# Patient Record
Sex: FEMALE | Race: WHITE | Employment: OTHER | URBAN - METROPOLITAN AREA
[De-identification: names, ages, dates, MRNs, and addresses within clinical notes are randomized per-mention and may not be internally consistent; named-entity substitution may affect disease eponyms.]

---

## 2017-05-22 ENCOUNTER — OFFICE VISIT (OUTPATIENT)
Dept: OBGYN CLINIC | Age: 67
End: 2017-05-22
Payer: MEDICARE

## 2017-05-22 VITALS
SYSTOLIC BLOOD PRESSURE: 136 MMHG | DIASTOLIC BLOOD PRESSURE: 62 MMHG | WEIGHT: 236 LBS | HEIGHT: 62 IN | BODY MASS INDEX: 43.43 KG/M2

## 2017-05-22 DIAGNOSIS — Z87.410 HISTORY OF CERVICAL DYSPLASIA: ICD-10-CM

## 2017-05-22 DIAGNOSIS — B97.7 HIGH RISK HPV INFECTION: Primary | ICD-10-CM

## 2017-05-22 DIAGNOSIS — B97.7 HPV IN FEMALE: ICD-10-CM

## 2017-05-22 PROCEDURE — 3014F SCREEN MAMMO DOC REV: CPT | Performed by: OBSTETRICS & GYNECOLOGY

## 2017-05-22 PROCEDURE — 99203 OFFICE O/P NEW LOW 30 MIN: CPT | Performed by: OBSTETRICS & GYNECOLOGY

## 2017-05-22 PROCEDURE — 1036F TOBACCO NON-USER: CPT | Performed by: OBSTETRICS & GYNECOLOGY

## 2017-05-22 PROCEDURE — G8417 CALC BMI ABV UP PARAM F/U: HCPCS | Performed by: OBSTETRICS & GYNECOLOGY

## 2017-05-22 PROCEDURE — G8427 DOCREV CUR MEDS BY ELIG CLIN: HCPCS | Performed by: OBSTETRICS & GYNECOLOGY

## 2017-05-22 PROCEDURE — 1123F ACP DISCUSS/DSCN MKR DOCD: CPT | Performed by: OBSTETRICS & GYNECOLOGY

## 2017-05-22 PROCEDURE — G8400 PT W/DXA NO RESULTS DOC: HCPCS | Performed by: OBSTETRICS & GYNECOLOGY

## 2017-05-22 PROCEDURE — 3017F COLORECTAL CA SCREEN DOC REV: CPT | Performed by: OBSTETRICS & GYNECOLOGY

## 2017-05-22 PROCEDURE — 1090F PRES/ABSN URINE INCON ASSESS: CPT | Performed by: OBSTETRICS & GYNECOLOGY

## 2017-05-22 PROCEDURE — 4040F PNEUMOC VAC/ADMIN/RCVD: CPT | Performed by: OBSTETRICS & GYNECOLOGY

## 2017-05-22 RX ORDER — LANCETS
EACH MISCELLANEOUS
COMMUNITY
Start: 2017-04-04

## 2017-05-22 RX ORDER — HYDROCODONE BITARTRATE AND ACETAMINOPHEN 5; 325 MG/1; MG/1
TABLET ORAL
Refills: 0 | COMMUNITY
Start: 2017-05-11

## 2017-05-22 RX ORDER — BLOOD SUGAR DIAGNOSTIC
STRIP MISCELLANEOUS
COMMUNITY
Start: 2017-04-04

## 2017-05-22 RX ORDER — EPINEPHRINE 0.3 MG/.3ML
INJECTION INTRAMUSCULAR
COMMUNITY
Start: 2017-04-02

## 2017-06-15 ENCOUNTER — PROCEDURE VISIT (OUTPATIENT)
Dept: OBGYN CLINIC | Age: 67
End: 2017-06-15
Payer: MEDICARE

## 2017-06-15 ENCOUNTER — HOSPITAL ENCOUNTER (OUTPATIENT)
Age: 67
Setting detail: SPECIMEN
Discharge: HOME | End: 2017-06-15
Payer: MEDICARE

## 2017-06-15 VITALS
DIASTOLIC BLOOD PRESSURE: 68 MMHG | BODY MASS INDEX: 43.43 KG/M2 | SYSTOLIC BLOOD PRESSURE: 120 MMHG | WEIGHT: 236 LBS | HEIGHT: 62 IN

## 2017-06-15 DIAGNOSIS — Z87.410 HISTORY OF CERVICAL DYSPLASIA: ICD-10-CM

## 2017-06-15 DIAGNOSIS — R87.810 CERVICAL HIGH RISK HUMAN PAPILLOMAVIRUS (HPV) DNA TEST POSITIVE: Primary | ICD-10-CM

## 2017-06-15 DIAGNOSIS — B97.7 HPV IN FEMALE: ICD-10-CM

## 2017-06-15 PROCEDURE — 1036F TOBACCO NON-USER: CPT | Performed by: OBSTETRICS & GYNECOLOGY

## 2017-06-15 PROCEDURE — 57454 BX/CURETT OF CERVIX W/SCOPE: CPT | Performed by: OBSTETRICS & GYNECOLOGY

## 2017-06-15 RX ORDER — LORATADINE 10 MG/1
10 CAPSULE, LIQUID FILLED ORAL DAILY
Status: ON HOLD | COMMUNITY
End: 2018-09-21 | Stop reason: ALTCHOICE

## 2017-06-19 LAB — SURGICAL PATHOLOGY REPORT: NORMAL

## 2017-06-20 ENCOUNTER — TELEPHONE (OUTPATIENT)
Dept: OBGYN CLINIC | Age: 67
End: 2017-06-20

## 2018-03-22 ENCOUNTER — OFFICE VISIT (OUTPATIENT)
Dept: OBGYN CLINIC | Age: 68
End: 2018-03-22
Payer: MEDICARE

## 2018-03-22 ENCOUNTER — HOSPITAL ENCOUNTER (OUTPATIENT)
Age: 68
Setting detail: SPECIMEN
Discharge: HOME | End: 2018-03-22
Payer: MEDICARE

## 2018-03-22 VITALS
SYSTOLIC BLOOD PRESSURE: 118 MMHG | WEIGHT: 233 LBS | DIASTOLIC BLOOD PRESSURE: 60 MMHG | HEIGHT: 63 IN | BODY MASS INDEX: 41.29 KG/M2

## 2018-03-22 DIAGNOSIS — N89.8 VAGINAL ITCHING: Primary | ICD-10-CM

## 2018-03-22 DIAGNOSIS — N90.89 VULVAR LESION: ICD-10-CM

## 2018-03-22 LAB
DIRECT EXAM: NORMAL
Lab: NORMAL
SPECIMEN DESCRIPTION: NORMAL
STATUS: NORMAL

## 2018-03-22 PROCEDURE — 3017F COLORECTAL CA SCREEN DOC REV: CPT | Performed by: OBSTETRICS & GYNECOLOGY

## 2018-03-22 PROCEDURE — 4040F PNEUMOC VAC/ADMIN/RCVD: CPT | Performed by: OBSTETRICS & GYNECOLOGY

## 2018-03-22 PROCEDURE — G8400 PT W/DXA NO RESULTS DOC: HCPCS | Performed by: OBSTETRICS & GYNECOLOGY

## 2018-03-22 PROCEDURE — 1123F ACP DISCUSS/DSCN MKR DOCD: CPT | Performed by: OBSTETRICS & GYNECOLOGY

## 2018-03-22 PROCEDURE — G8427 DOCREV CUR MEDS BY ELIG CLIN: HCPCS | Performed by: OBSTETRICS & GYNECOLOGY

## 2018-03-22 PROCEDURE — 1036F TOBACCO NON-USER: CPT | Performed by: OBSTETRICS & GYNECOLOGY

## 2018-03-22 PROCEDURE — 1090F PRES/ABSN URINE INCON ASSESS: CPT | Performed by: OBSTETRICS & GYNECOLOGY

## 2018-03-22 PROCEDURE — G8417 CALC BMI ABV UP PARAM F/U: HCPCS | Performed by: OBSTETRICS & GYNECOLOGY

## 2018-03-22 PROCEDURE — 99213 OFFICE O/P EST LOW 20 MIN: CPT | Performed by: OBSTETRICS & GYNECOLOGY

## 2018-03-22 PROCEDURE — 3014F SCREEN MAMMO DOC REV: CPT | Performed by: OBSTETRICS & GYNECOLOGY

## 2018-03-22 PROCEDURE — G8484 FLU IMMUNIZE NO ADMIN: HCPCS | Performed by: OBSTETRICS & GYNECOLOGY

## 2018-03-22 RX ORDER — MELOXICAM 15 MG/1
TABLET ORAL
Refills: 0 | COMMUNITY
Start: 2018-01-24

## 2018-03-22 RX ORDER — FLUCONAZOLE 150 MG/1
150 TABLET ORAL ONCE
Qty: 1 TABLET | Refills: 0 | Status: SHIPPED | OUTPATIENT
Start: 2018-03-22 | End: 2018-03-22

## 2018-03-22 RX ORDER — CLOBETASOL PROPIONATE 0.5 MG/G
CREAM TOPICAL
Qty: 1 TUBE | Refills: 0 | Status: SHIPPED | OUTPATIENT
Start: 2018-03-22 | End: 2018-04-04 | Stop reason: SDUPTHER

## 2018-03-22 NOTE — PROGRESS NOTES
Homicidal thoughts,suicidal thoughts, or anxiety  Breast ROS: No prior breast abnormalities or lumps  Respiratory ROS: No SOB, Pneumoniae,Cough, or Pulmonary Embolism History  Cardiovascular ROS: No Chest Pain with Exertion, Palpitations, Syncope, Edema, Arrhythmia  Gastrointestinal ROS: No Indigestion, Heartburn, Nausea, vomiting, Diarrhea, Constipation,or Bowel Changes; No Bloody Stools or melena  Genito-Urinary ROS: No Dysuria, Hematuria or Nocturia. No Urinary Incontinence or Vaginal Discharge +vaginal itching  Musculoskeletal ROS: No Arthralgia, Arthritis,Gout,Osteoporosis or Rheumatism  Neurological ROS: No CVA, Migraines, Epilepsy, Seizure Hx, or Limb Weakness  Dermatological ROS: No Rash, Itching, Hives, Mole Changes or Cancer    Blood pressure 118/60, height 5' 2.5\" (1.588 m), weight 233 lb (105.7 kg), not currently breastfeeding. Abdomen: Soft non-tender; good bowel sounds. No guarding, rebound or rigidity. No CVA tenderness bilaterally. Extremities: No calf tenderness, DTR 2/4, and No edema bilaterally    Pelvic: Vulva has noticeable raised, white, hard round lesions around periurethral and superior bilateral labial area. Vagina appear normal without lesion or discharge, mild atrophy noted. Diagnostics:  No results found. Lab Results:  Results for orders placed or performed during the hospital encounter of 06/15/17   Surgical Pathology   Result Value Ref Range    Surgical Pathology Report       (NOTE)  [address], [facility] [address]. [place], [address]  [phone]  Fax: [phone]    [facility]    Patient Name: Denise Hope Med Rec: [id number]  Path Number: [id number]  Collected: 6/15/2017  Received: 6/16/2017  Reported: 6/19/2017 14:12    -- Diagnosis --    1. ENDOCERVICAL CURETTING:        SQUAMOUS MUCOSA, NEGATIVE FOR DYSPLASIA.     2.  CERVIX, BIOPSY AT 4:00: SQUAMOUS MUCOSA WITH MILD CHRONIC INFLAMMATION,      NEGATIVE FOR DYSPLASIA       ROBERTA Hunter  **Electronically Signed Out**         rdd/6/19/2017      Clinical Information  Pre-op Diagnosis:  CERVICAL HIGH RISK HPV DNA TEST POSITIVE; HISTORY  OF CERVICAL DYSPLASIA   Operative Findings:  ECC, 4 O'CLOCK    Source of Specimen  1: ECC  2: 4 O'CLOCK    Gross Description  1. \"ALEJANDRA TINTA, ECC\" Few pale tan tissue flecks that may not  survive processing. Entirely 1cs. 2. \"ALEJANDRA TINTA, 4 O'CL OCK\" 0.4 x 0.1 x 0.1 cm tan-white fragment. Entirely 1cs. po tm      Microscopic Description  1. Sample consists of scant fragments of squamous mucosa without  atypia. 2.  Biopsy samples squamous mucosa. This is mature with mild chronic  inflammation, predominantly a few lymphocytes in the subepithelium. There is no dysplasia. Assessment:  1. Vaginal itching  VAGINITIS DNA PROBE    Strep B Screen, Vaginal / Rectal    fluconazole (DIFLUCAN) 150 MG tablet    clobetasol (TEMOVATE) 0.05 % cream   2. Vulvar lesion  VAGINITIS DNA PROBE    Strep B Screen, Vaginal / Rectal    fluconazole (DIFLUCAN) 150 MG tablet    clobetasol (TEMOVATE) 0.05 % cream     Patient Active Problem List    Diagnosis Date Noted    HPV in female 05/22/2017 2017 Normal Pap Smear- + HPV Type 18      History of cervical dysplasia 05/22/2017     Cervical conization x2       PLAN:  Return in about 1 week (around 3/29/2018) for Vulvar biopsy. Diflucan and topical steroid cream  Vulvar biopsy  Repeat Annual every 1 year  Cervical Cytology Evaluation begins at 24years old. If Negative Cytology, Follow-up screening per current guidelines. Counseled on preventative health maintenance follow-up. Orders Placed This Encounter   Procedures    VAGINITIS DNA PROBE    Strep B Screen, Vaginal / Rectal       Patient was seen with total face to face time of 15 minutes.  More than 50% of this visit was counseling and education regarding The

## 2018-03-25 LAB
CULTURE: NORMAL
CULTURE: NORMAL
Lab: NORMAL
SPECIMEN DESCRIPTION: NORMAL
STATUS: NORMAL

## 2018-03-28 ENCOUNTER — PROCEDURE VISIT (OUTPATIENT)
Dept: OBGYN CLINIC | Age: 68
End: 2018-03-28
Payer: MEDICARE

## 2018-03-28 ENCOUNTER — HOSPITAL ENCOUNTER (OUTPATIENT)
Age: 68
Setting detail: SPECIMEN
Discharge: HOME | End: 2018-03-28
Payer: MEDICARE

## 2018-03-28 VITALS — DIASTOLIC BLOOD PRESSURE: 70 MMHG | SYSTOLIC BLOOD PRESSURE: 118 MMHG

## 2018-03-28 DIAGNOSIS — N89.8 VAGINAL ITCHING: Primary | ICD-10-CM

## 2018-03-28 DIAGNOSIS — N90.89 LABIAL IRRITATION: ICD-10-CM

## 2018-03-28 DIAGNOSIS — R23.4 SKIN TEXTURE CHANGES: ICD-10-CM

## 2018-03-28 PROCEDURE — 56605 BIOPSY OF VULVA/PERINEUM: CPT | Performed by: OBSTETRICS & GYNECOLOGY

## 2018-03-30 LAB — SURGICAL PATHOLOGY REPORT: NORMAL

## 2018-04-02 ENCOUNTER — TELEPHONE (OUTPATIENT)
Dept: OBGYN CLINIC | Age: 68
End: 2018-04-02

## 2018-04-04 ENCOUNTER — TELEPHONE (OUTPATIENT)
Dept: OBGYN CLINIC | Age: 68
End: 2018-04-04

## 2018-04-04 DIAGNOSIS — N90.89 VULVAR LESION: ICD-10-CM

## 2018-04-04 DIAGNOSIS — N89.8 VAGINAL ITCHING: ICD-10-CM

## 2018-04-04 RX ORDER — CLOBETASOL PROPIONATE 0.5 MG/G
CREAM TOPICAL
Qty: 1 TUBE | Refills: 1 | Status: SHIPPED | OUTPATIENT
Start: 2018-04-04 | End: 2018-07-26 | Stop reason: SDUPTHER

## 2018-05-29 DIAGNOSIS — N90.89 VULVAR LESION: ICD-10-CM

## 2018-05-29 DIAGNOSIS — N89.8 VAGINAL ITCHING: ICD-10-CM

## 2018-05-30 RX ORDER — CLOBETASOL PROPIONATE 0.5 MG/G
CREAM TOPICAL
Qty: 15 G | Refills: 0 | Status: SHIPPED | OUTPATIENT
Start: 2018-05-30 | End: 2018-07-02 | Stop reason: SDUPTHER

## 2018-07-02 DIAGNOSIS — N89.8 VAGINAL ITCHING: ICD-10-CM

## 2018-07-02 DIAGNOSIS — N90.89 VULVAR LESION: ICD-10-CM

## 2018-07-02 RX ORDER — CLOBETASOL PROPIONATE 0.5 MG/G
CREAM TOPICAL
Qty: 15 G | Refills: 0 | Status: SHIPPED | OUTPATIENT
Start: 2018-07-02 | End: 2018-07-26 | Stop reason: SDUPTHER

## 2018-07-26 ENCOUNTER — OFFICE VISIT (OUTPATIENT)
Dept: OBGYN CLINIC | Age: 68
End: 2018-07-26
Payer: MEDICARE

## 2018-07-26 ENCOUNTER — HOSPITAL ENCOUNTER (OUTPATIENT)
Age: 68
Setting detail: SPECIMEN
Discharge: HOME | End: 2018-07-26
Payer: MEDICARE

## 2018-07-26 VITALS
WEIGHT: 240.38 LBS | BODY MASS INDEX: 42.59 KG/M2 | HEIGHT: 63 IN | RESPIRATION RATE: 16 BRPM | SYSTOLIC BLOOD PRESSURE: 114 MMHG | DIASTOLIC BLOOD PRESSURE: 62 MMHG

## 2018-07-26 DIAGNOSIS — B97.7 HPV IN FEMALE: Primary | ICD-10-CM

## 2018-07-26 DIAGNOSIS — N90.4 LICHEN SCLEROSUS OF FEMALE GENITALIA: ICD-10-CM

## 2018-07-26 DIAGNOSIS — Z87.410 HISTORY OF CERVICAL DYSPLASIA: ICD-10-CM

## 2018-07-26 DIAGNOSIS — N89.8 VAGINAL ITCHING: ICD-10-CM

## 2018-07-26 DIAGNOSIS — N90.89 VULVAR LESION: ICD-10-CM

## 2018-07-26 PROCEDURE — 99213 OFFICE O/P EST LOW 20 MIN: CPT | Performed by: OBSTETRICS & GYNECOLOGY

## 2018-07-26 PROCEDURE — G8400 PT W/DXA NO RESULTS DOC: HCPCS | Performed by: OBSTETRICS & GYNECOLOGY

## 2018-07-26 PROCEDURE — 3017F COLORECTAL CA SCREEN DOC REV: CPT | Performed by: OBSTETRICS & GYNECOLOGY

## 2018-07-26 PROCEDURE — 4040F PNEUMOC VAC/ADMIN/RCVD: CPT | Performed by: OBSTETRICS & GYNECOLOGY

## 2018-07-26 PROCEDURE — 1123F ACP DISCUSS/DSCN MKR DOCD: CPT | Performed by: OBSTETRICS & GYNECOLOGY

## 2018-07-26 PROCEDURE — 1101F PT FALLS ASSESS-DOCD LE1/YR: CPT | Performed by: OBSTETRICS & GYNECOLOGY

## 2018-07-26 PROCEDURE — G8417 CALC BMI ABV UP PARAM F/U: HCPCS | Performed by: OBSTETRICS & GYNECOLOGY

## 2018-07-26 PROCEDURE — 1090F PRES/ABSN URINE INCON ASSESS: CPT | Performed by: OBSTETRICS & GYNECOLOGY

## 2018-07-26 PROCEDURE — 1036F TOBACCO NON-USER: CPT | Performed by: OBSTETRICS & GYNECOLOGY

## 2018-07-26 PROCEDURE — G8427 DOCREV CUR MEDS BY ELIG CLIN: HCPCS | Performed by: OBSTETRICS & GYNECOLOGY

## 2018-07-26 RX ORDER — SIMVASTATIN 40 MG
20 TABLET ORAL
COMMUNITY
Start: 2018-07-16

## 2018-07-26 RX ORDER — CLOBETASOL PROPIONATE 0.5 MG/G
CREAM TOPICAL 2 TIMES DAILY
Qty: 15 G | Refills: 0 | Status: SHIPPED | OUTPATIENT
Start: 2018-07-26 | End: 2018-10-11 | Stop reason: SDUPTHER

## 2018-07-26 ASSESSMENT — PATIENT HEALTH QUESTIONNAIRE - PHQ9
2. FEELING DOWN, DEPRESSED OR HOPELESS: 0
1. LITTLE INTEREST OR PLEASURE IN DOING THINGS: 0
SUM OF ALL RESPONSES TO PHQ9 QUESTIONS 1 & 2: 0
SUM OF ALL RESPONSES TO PHQ QUESTIONS 1-9: 0

## 2018-07-26 NOTE — PROGRESS NOTES
CHANGES; LABIAL  IRRITATION   Operative Findings:  R LABIA BX    Source of Specimen  1: R LABIA BX    Gross Description  \"ALEJANDRA BALTAZAR, R LABIA BX\" 0.3 cm long x 0.2 cm in diameter tan punch. Inked intact 1cs. tm      Microscopic Description  Hyperkeratosis is present on the surface of the squamous epithelium. T he granular cell layer is mildly prominent, and there are a few,  scattered intraepithelial dyskeratotic cells. The subepithelial  tissue appears eosinophilic/hyalinized, with patchy chronic  inflammation. PAS stain (control appropriate) is negative for  infectious fungal elements. There is no evidence of dysplasia or  malignancy. Assessment:   Diagnosis Orders   1. HPV in female  PAP SMEAR   2. History of cervical dysplasia  PAP SMEAR   3. Vaginal itching  clobetasol (TEMOVATE) 0.05 % cream   4. Vulvar lesion  clobetasol (TEMOVATE) 0.05 % cream   5. Lichen sclerosus of female genitalia  clobetasol (TEMOVATE) 0.05 % cream     Patient Active Problem List    Diagnosis Date Noted    Lichen sclerosus of female genitalia 07/26/2018    HPV in female 05/22/2017     2017 Normal Pap Smear- + HPV Type 18      History of cervical dysplasia 05/22/2017     Cervical conization x2           PLAN:  Return if symptoms worsen or fail to improve, for annual.  Follow pap and HR HPV  Repeat Annual every 1 year  Cervical Cytology Evaluation begins at 24years old. If Negative Cytology, Follow-up screening per current guidelines. Counseled on preventative health maintenance follow-up. Orders Placed This Encounter   Procedures    PAP SMEAR     Patient History:    No LMP recorded.  Patient is postmenopausal.  OBGYN Status: Postmenopausal  Past Surgical History:  No date: APPENDECTOMY  No date: CHOLECYSTECTOMY  06/15/2017: COLPOSCOPY      Comment: dr bella- has had previous colp before  No date: ENDOSCOPY, COLON, DIAGNOSTIC      Comment: EGD  09-05-14: ENDOSCOPY, COLON, DIAGNOSTIC      Comment: yaya esoph, milliary lesions duodenal                bulb, hiatal hernia, healed antral ulcer. No date: FOOT SURGERY Right      Comment: foreign body removed  No date: JOINT REPLACEMENT Bilateral      Comment: knees  No date: TUBAL LIGATION  9/11/15: UPPER GASTROINTESTINAL ENDOSCOPY      Comment: BARRETTS    Problem List       Edg Problems Affecting Cytology    HPV in female    Overview Signed 6/15/2017  9:29 AM by Nayely Michelle Normal Pap Smear- + HPV Type 18       History of cervical dysplasia    Overview Signed 5/22/2017  8:41 AM by Queta Anderson,      Cervical conization x2        Smoking status: Former Smoker                                                              Packs/day: 0.00      Years: 0.00         Quit date: 1/4/2011     Smokeless tobacco: Not on file                          Standing Status:   Future     Standing Expiration Date:   7/27/2019     Order Specific Question:   Collection Type     Answer: Thin Prep     Order Specific Question:   Prior Abnormal Pap Test     Answer:   No     Order Specific Question:   Screening or Diagnostic     Answer:   Diagnostic     Order Specific Question:   HPV Requested? Answer:   Yes     Order Specific Question:   High Risk Patient     Answer:   N/A       Patient was seen with total face to face time of 15 minutes. More than 50% of this visit was counseling and education regarding The primary encounter diagnosis was HPV in female. Diagnoses of History of cervical dysplasia, Vaginal itching, Vulvar lesion, and Lichen sclerosus of female genitalia were also pertinent to this visit. and Abnormal Pap Smear   as well as  counseling on preventative health maintenance follow-up.

## 2018-07-27 LAB
HPV SAMPLE: ABNORMAL
HPV SOURCE: ABNORMAL
HPV, GENOTYPE 16: NOT DETECTED
HPV, GENOTYPE 18: DETECTED
HPV, HIGH RISK OTHER: NOT DETECTED
HPV, INTERPRETATION: ABNORMAL

## 2018-08-11 LAB — CYTOLOGY REPORT: NORMAL

## 2018-08-14 ENCOUNTER — TELEPHONE (OUTPATIENT)
Dept: OBGYN CLINIC | Age: 68
End: 2018-08-14

## 2018-08-14 NOTE — TELEPHONE ENCOUNTER
----- Message from Ana Danielle DO sent at 8/14/2018  8:09 AM EDT -----  Pap smear is negative, but with absent transformation zone and +HR HPV type 18. Per ASCCP guidelines I recommend colposcopy when patient is able. Thank you.

## 2018-09-21 ENCOUNTER — ANESTHESIA EVENT (OUTPATIENT)
Dept: OPERATING ROOM | Age: 68
End: 2018-09-21
Payer: MEDICARE

## 2018-09-21 ENCOUNTER — ANESTHESIA (OUTPATIENT)
Dept: OPERATING ROOM | Age: 68
End: 2018-09-21
Payer: MEDICARE

## 2018-09-21 ENCOUNTER — HOSPITAL ENCOUNTER (OUTPATIENT)
Age: 68
Setting detail: OUTPATIENT SURGERY
Discharge: HOME | End: 2018-09-21
Attending: SURGERY | Admitting: SURGERY
Payer: MEDICARE

## 2018-09-21 VITALS
HEART RATE: 87 BPM | BODY MASS INDEX: 44.16 KG/M2 | OXYGEN SATURATION: 94 % | DIASTOLIC BLOOD PRESSURE: 82 MMHG | SYSTOLIC BLOOD PRESSURE: 122 MMHG | WEIGHT: 240 LBS | HEIGHT: 62 IN | TEMPERATURE: 97.4 F | RESPIRATION RATE: 16 BRPM

## 2018-09-21 VITALS — SYSTOLIC BLOOD PRESSURE: 85 MMHG | OXYGEN SATURATION: 96 % | DIASTOLIC BLOOD PRESSURE: 50 MMHG

## 2018-09-21 LAB — GLUCOSE BLD-MCNC: 119 MG/DL (ref 65–105)

## 2018-09-21 PROCEDURE — 3700000001 HC ADD 15 MINUTES (ANESTHESIA): Performed by: SURGERY

## 2018-09-21 PROCEDURE — 3700000000 HC ANESTHESIA ATTENDED CARE: Performed by: SURGERY

## 2018-09-21 PROCEDURE — 7100000031 HC ASPR PHASE II RECOVERY - ADDTL 15 MIN: Performed by: SURGERY

## 2018-09-21 PROCEDURE — 82947 ASSAY GLUCOSE BLOOD QUANT: CPT

## 2018-09-21 PROCEDURE — 6360000002 HC RX W HCPCS

## 2018-09-21 PROCEDURE — 2500000003 HC RX 250 WO HCPCS

## 2018-09-21 PROCEDURE — 3609027000 HC COLONOSCOPY: Performed by: SURGERY

## 2018-09-21 PROCEDURE — 2580000003 HC RX 258: Performed by: SURGERY

## 2018-09-21 PROCEDURE — 7100000030 HC ASPR PHASE II RECOVERY - FIRST 15 MIN: Performed by: SURGERY

## 2018-09-21 PROCEDURE — 7100000000 HC PACU RECOVERY - FIRST 15 MIN: Performed by: SURGERY

## 2018-09-21 PROCEDURE — 2709999900 HC NON-CHARGEABLE SUPPLY: Performed by: SURGERY

## 2018-09-21 PROCEDURE — 7100000001 HC PACU RECOVERY - ADDTL 15 MIN: Performed by: SURGERY

## 2018-09-21 RX ORDER — DEXAMETHASONE SODIUM PHOSPHATE 4 MG/ML
INJECTION, SOLUTION INTRA-ARTICULAR; INTRALESIONAL; INTRAMUSCULAR; INTRAVENOUS; SOFT TISSUE PRN
Status: DISCONTINUED | OUTPATIENT
Start: 2018-09-21 | End: 2018-09-21 | Stop reason: SDUPTHER

## 2018-09-21 RX ORDER — SODIUM CHLORIDE, SODIUM LACTATE, POTASSIUM CHLORIDE, CALCIUM CHLORIDE 600; 310; 30; 20 MG/100ML; MG/100ML; MG/100ML; MG/100ML
INJECTION, SOLUTION INTRAVENOUS CONTINUOUS
Status: DISCONTINUED | OUTPATIENT
Start: 2018-09-21 | End: 2018-09-21 | Stop reason: HOSPADM

## 2018-09-21 RX ORDER — DIPHENHYDRAMINE HYDROCHLORIDE 50 MG/ML
12.5 INJECTION INTRAMUSCULAR; INTRAVENOUS
Status: DISCONTINUED | OUTPATIENT
Start: 2018-09-21 | End: 2018-09-21 | Stop reason: HOSPADM

## 2018-09-21 RX ORDER — MEPERIDINE HYDROCHLORIDE 50 MG/ML
12.5 INJECTION INTRAMUSCULAR; INTRAVENOUS; SUBCUTANEOUS EVERY 5 MIN PRN
Status: DISCONTINUED | OUTPATIENT
Start: 2018-09-21 | End: 2018-09-21 | Stop reason: HOSPADM

## 2018-09-21 RX ORDER — ONDANSETRON 2 MG/ML
4 INJECTION INTRAMUSCULAR; INTRAVENOUS
Status: DISCONTINUED | OUTPATIENT
Start: 2018-09-21 | End: 2018-09-21 | Stop reason: HOSPADM

## 2018-09-21 RX ORDER — PROPOFOL 10 MG/ML
INJECTION, EMULSION INTRAVENOUS PRN
Status: DISCONTINUED | OUTPATIENT
Start: 2018-09-21 | End: 2018-09-21 | Stop reason: SDUPTHER

## 2018-09-21 RX ORDER — MORPHINE SULFATE 2 MG/ML
2 INJECTION, SOLUTION INTRAMUSCULAR; INTRAVENOUS EVERY 5 MIN PRN
Status: DISCONTINUED | OUTPATIENT
Start: 2018-09-21 | End: 2018-09-21 | Stop reason: HOSPADM

## 2018-09-21 RX ORDER — LABETALOL HYDROCHLORIDE 5 MG/ML
5 INJECTION, SOLUTION INTRAVENOUS EVERY 10 MIN PRN
Status: DISCONTINUED | OUTPATIENT
Start: 2018-09-21 | End: 2018-09-21 | Stop reason: HOSPADM

## 2018-09-21 RX ORDER — ONDANSETRON 2 MG/ML
INJECTION INTRAMUSCULAR; INTRAVENOUS PRN
Status: DISCONTINUED | OUTPATIENT
Start: 2018-09-21 | End: 2018-09-21 | Stop reason: SDUPTHER

## 2018-09-21 RX ORDER — FENTANYL CITRATE 50 UG/ML
INJECTION, SOLUTION INTRAMUSCULAR; INTRAVENOUS PRN
Status: DISCONTINUED | OUTPATIENT
Start: 2018-09-21 | End: 2018-09-21 | Stop reason: SDUPTHER

## 2018-09-21 RX ORDER — LIDOCAINE HYDROCHLORIDE 10 MG/ML
INJECTION, SOLUTION EPIDURAL; INFILTRATION; INTRACAUDAL; PERINEURAL PRN
Status: DISCONTINUED | OUTPATIENT
Start: 2018-09-21 | End: 2018-09-21 | Stop reason: SDUPTHER

## 2018-09-21 RX ORDER — EPHEDRINE SULFATE 50 MG/ML
INJECTION, SOLUTION INTRAVENOUS PRN
Status: DISCONTINUED | OUTPATIENT
Start: 2018-09-21 | End: 2018-09-21 | Stop reason: SDUPTHER

## 2018-09-21 RX ADMIN — EPHEDRINE SULFATE 10 MG: 50 INJECTION INTRAMUSCULAR; INTRAVENOUS; SUBCUTANEOUS at 08:05

## 2018-09-21 RX ADMIN — SODIUM CHLORIDE, POTASSIUM CHLORIDE, SODIUM LACTATE AND CALCIUM CHLORIDE: 600; 310; 30; 20 INJECTION, SOLUTION INTRAVENOUS at 06:54

## 2018-09-21 RX ADMIN — EPHEDRINE SULFATE 10 MG: 50 INJECTION INTRAMUSCULAR; INTRAVENOUS; SUBCUTANEOUS at 07:53

## 2018-09-21 RX ADMIN — FENTANYL CITRATE 50 MCG: 50 INJECTION, SOLUTION INTRAMUSCULAR; INTRAVENOUS at 07:50

## 2018-09-21 RX ADMIN — LIDOCAINE HYDROCHLORIDE 50 MG: 10 INJECTION, SOLUTION EPIDURAL; INFILTRATION; INTRACAUDAL; PERINEURAL at 07:33

## 2018-09-21 RX ADMIN — ONDANSETRON 4 MG: 2 INJECTION INTRAMUSCULAR; INTRAVENOUS at 07:38

## 2018-09-21 RX ADMIN — EPHEDRINE SULFATE 10 MG: 50 INJECTION INTRAMUSCULAR; INTRAVENOUS; SUBCUTANEOUS at 07:55

## 2018-09-21 RX ADMIN — PROPOFOL 200 MG: 10 INJECTION, EMULSION INTRAVENOUS at 07:33

## 2018-09-21 RX ADMIN — DEXAMETHASONE SODIUM PHOSPHATE 4 MG: 4 INJECTION, SOLUTION INTRAMUSCULAR; INTRAVENOUS at 07:38

## 2018-09-21 ASSESSMENT — PULMONARY FUNCTION TESTS
PIF_VALUE: 11
PIF_VALUE: 12
PIF_VALUE: 14
PIF_VALUE: 14
PIF_VALUE: 13
PIF_VALUE: 1
PIF_VALUE: 12
PIF_VALUE: 13
PIF_VALUE: 15
PIF_VALUE: 12
PIF_VALUE: 11
PIF_VALUE: 1
PIF_VALUE: 14
PIF_VALUE: 14
PIF_VALUE: 1
PIF_VALUE: 3
PIF_VALUE: 14
PIF_VALUE: 13
PIF_VALUE: 12
PIF_VALUE: 27
PIF_VALUE: 1
PIF_VALUE: 13
PIF_VALUE: 14
PIF_VALUE: 13
PIF_VALUE: 13
PIF_VALUE: 14
PIF_VALUE: 14
PIF_VALUE: 11
PIF_VALUE: 17
PIF_VALUE: 11
PIF_VALUE: 12
PIF_VALUE: 11
PIF_VALUE: 14
PIF_VALUE: 12
PIF_VALUE: 11
PIF_VALUE: 12
PIF_VALUE: 1
PIF_VALUE: 14
PIF_VALUE: 14
PIF_VALUE: 13
PIF_VALUE: 12
PIF_VALUE: 13
PIF_VALUE: 14
PIF_VALUE: 12
PIF_VALUE: 11

## 2018-09-21 ASSESSMENT — PAIN SCALES - GENERAL
PAINLEVEL_OUTOF10: 0

## 2018-09-21 ASSESSMENT — ENCOUNTER SYMPTOMS
STRIDOR: 0
SHORTNESS OF BREATH: 0

## 2018-09-21 ASSESSMENT — LIFESTYLE VARIABLES: SMOKING_STATUS: 0

## 2018-09-21 ASSESSMENT — PAIN - FUNCTIONAL ASSESSMENT: PAIN_FUNCTIONAL_ASSESSMENT: 0-10

## 2018-09-21 NOTE — ANESTHESIA POSTPROCEDURE EVALUATION
POST- ANESTHESIA EVALUATION       Pt Name: Gieslle Sam  MRN: [id number]  [place]: 1950  Date of evaluation: 9/21/2018  Time:  1:45 PM      /82   Pulse 87   Temp 97.4 °F (36.3 °C) (Temporal)   Resp 16   Ht 5' 2\" (1.575 m)   Wt 240 lb (108.9 kg)   SpO2 94%   BMI 43.90 kg/m²      Consciousness Level  Awake  Cardiopulmonary Status  Stable  Pain Adequately Treated YES  Nausea / Vomiting  NO  Adequate Hydration  YES  Anesthesia Related Complications NONE      Electronically signed by Colonel Riedel, MD on 9/21/2018 at 1:45 PM       Department of Anesthesiology  Postprocedure Note    Patient: Giselle Sam  MRN: [id number]  YOB: 1950  Date of evaluation: 9/21/2018  Time:  1:45 PM     Procedure Summary     Date:  09/21/18 Room / Location:  [address] [place] [address] Denise Gauthier 08 / [address]    Anesthesia Start:  0247 Anesthesia Stop:  0820    Procedure:  COLONOSCOPY (N/A ) Diagnosis:  (DIVERTICULITIS (PAT ON ADMIT OFFICE TO Luis Tavares))    Surgeon:  Ary Badillo MD Responsible Provider:  Colonel Riedel, MD    Anesthesia Type:  MAC, general ASA Status:  3          Anesthesia Type: MAC, general    Miguel Ángel Phase I: Miguel Ángel Score: 10    Miguel Ángel Phase II: Miguel Ángel Score: 10    Last vitals: Reviewed and per EMR flowsheets.        Anesthesia Post Evaluation

## 2018-09-21 NOTE — H&P
No current facility-administered medications on file prior to encounter. Current Outpatient Prescriptions on File Prior to Encounter   Medication Sig Dispense Refill    simvastatin (ZOCOR) 40 MG tablet simvastatin 40 mg tablet   Take 0.5 tablets every day by oral route.  clobetasol (TEMOVATE) 0.05 % cream Apply topically 2 times daily Apply topically 2 times daily. 15 g 0    meloxicam (MOBIC) 15 MG tablet TK 1 T PO QD  0    PROAIR  (90 BASE) MCG/ACT inhaler       NEXIUM 40 MG delayed release capsule       HYDROcodone-acetaminophen (NORCO) 5-325 MG per tablet TK 1 T PO TID PRN MUST LAST 30 DAYS  0    metFORMIN (GLUCOPHAGE) 1000 MG tablet       CALCIUM-VITAMIN D PO Take 1 tablet by mouth daily 1200mg/1000mg      aspirin 81 MG tablet Take 81 mg by mouth daily.  lisinopril-hydrochlorothiazide (PRINZIDE;ZESTORETIC) 20-12.5 MG per tablet Take 1 tablet by mouth daily.  Multiple Vitamins-Minerals (THERAPEUTIC MULTIVITAMIN-MINERALS) tablet Take 1 tablet by mouth daily.  EPIPEN 2-ELISEO 0.3 MG/0.3ML SOAJ injection       ACCU-CHEK ANNABEL PLUS strip       Accu-Chek Multiclix Lancets MISC       alendronate (FOSAMAX) 70 MG tablet Take 70 mg by mouth every 7 days Wed. Negative except for what is mentioned in the HPI. GENERAL PHYSICAL EXAM     Vitals: /69   Pulse 80   Temp 96.8 °F (36 °C)   Resp 16   Ht 5' 2\" (1.575 m)   Wt 240 lb (108.9 kg)   SpO2 97%   BMI 43.90 kg/m²  Body mass index is 43.9 kg/m². GENERAL APPEARANCE:   Amado Loaiza is 70 y.o.,  female, not obese, nourished, Denies any pain today. SKIN:  Warm, dry,               HEAD:  Normocephalic, Face symmetrical.                  EYES:  SAVANAH EOMI and gaze conjugate, Glasses            EARS:  No marked hearing loss. NOSE:  Nares patent .                   THROAT:  No erythema of pharynx and uvula is midline  Dentition is not loose NECK:    Has no carotid bruit,   Neck is supple,  Has no adenopathy. CHEST:  Good chest excursion                HEART:  HT regular, No murmer                 LUNGS:  Has no wheezes           ABDOMEN:   Abdomen is soft on palpation. No localized tenderness. No guarding or rigidity. No palpable organomegaly. LYMPHATICS:  No palpable cervical lymphadenopathy. LOCOMOTOR, BACK AND SPINE:  Has no back pain,                  EXTREMITIES: Good range of motion of upper and lower extremities, No pedal edema.   Skin is clear, Grasp strength is 5/5     NEUROLOGIC:  Alert and clear speech, No weakness                       PROVISIONAL DIAGNOSES / SURGERY:      Colonoscopy  Patient Active Problem List    Diagnosis Date Noted    Lichen sclerosus of female genitalia 07/26/2018    HPV in female 05/22/2017    History of cervical dysplasia 05/22/2017           BEATRIZ Brand CNP on 9/21/2018 at 6:48 AM

## 2018-09-21 NOTE — OP NOTE
PROCEDURE NOTE    DATE OF PROCEDURE: 9/21/2018    SURGEON: Nguyen James MD    ASSISTANT: None    PREOPERATIVE DIAGNOSIS: Abdominal pain    POSTOPERATIVE DIAGNOSIS: Sigmoid and scattered diverticulosis/significantly prominent external hemorrhoids    OPERATION: Total colonoscopy to cecum    ANESTHESIA: General    ESTIMATED BLOOD LOSS: None    COMPLICATIONS: None     SPECIMENS:  Was Not Obtained    HISTORY: The patient is a 70y.o. year old female with history of above preop diagnosis. I recommended colonoscopy with possible biopsy or polypectomy and I explained the risk, benefits, expected outcome, and alternatives to the procedure. Risks included but are not limited to bleeding, infection, respiratory distress, hypotension, and perforation of the colon and possibility of missing a lesion. The patient understands and is in agreement. PROCEDURE: The patient was given IV conscious sedation. The patient's SPO2 remained above 90% throughout the procedure. Digital rectal exam was normal.  The colonoscope was inserted through the anus into the rectum and advanced under direct vision to the cecum without difficulty. Terminal ileum was examined for approximately 2 inches. The prep was fair. Findings:    Cecum/Ascending colon: normal    Transverse colon: abnormal: Scattered diverticulosis    Descending/Sigmoid colon: abnormal: Scattered diverticulosis    Rectum/Anus: examined in normal and retroflexed positions and was abnormal: Significantly prominent external hemorrhoids    Withdrawal Time was (minutes): 15      Next screening colonoscopy: 5 years. If screening is less than 10 years the recommended reason is due:Fair preparation    The colon was decompressed. While withdrawing the scope the above findings were verified and the scope was removed. The patient tolerated the procedure and conscious sedation without unusual events.     In the recovery room patient was examined and remains hemodynamically stable. Discharge home when criteria met. Recommendations/Plan:     1. Discussed with the family  2. High fiber diet   3.  Precautions to avoid constipation    Electronically signed by Nguyen James MD  on 9/21/2018 at 8:11 AM

## 2018-09-21 NOTE — ANESTHESIA PRE PROCEDURE
liquid consumption: 09/20/18                        Date of last solid food consumption: 09/19/18    BMI:   Wt Readings from Last 3 Encounters:   09/21/18 240 lb (108.9 kg)   07/26/18 240 lb 6 oz (109 kg)   03/22/18 233 lb (105.7 kg)     Body mass index is 43.9 kg/m². CBC:   Lab Results   Component Value Date    WBC 7.0 08/22/2014    RBC 3.53 08/22/2014    HGB 10.6 08/22/2014    HCT 31.6 08/22/2014    MCV 89.5 08/22/2014    RDW 13.7 08/22/2014     08/22/2014     HB 10.6    CMP:   Lab Results   Component Value Date     10/07/2016    K 4.9 10/07/2016     10/07/2016    CO2 23 10/07/2016    BUN 21 10/07/2016    CREATININE 1.0 10/07/2016    GFRAA >60 08/22/2014    LABGLOM >60 08/22/2014    GLUCOSE 111 10/07/2016    CALCIUM 8.9 10/07/2016    BILITOT 0.40 02/26/2013    ALKPHOS 68 02/26/2013    AST 27 02/26/2013    ALT 18 02/26/2013       POC Tests: No results for input(s): POCGLU, POCNA, POCK, POCCL, POCBUN, POCHEMO, POCHCT in the last 72 hours. Coags:   Lab Results   Component Value Date    PROTIME 10.7 02/26/2013    INR 1.0 02/26/2013    APTT 26.8 02/26/2013       HCG (If Applicable): No results found for: PREGTESTUR, PREGSERUM, HCG, HCGQUANT     ABGs: No results found for: PHART, PO2ART, KQI0YBM, PBL6OZI, BEART, H8SWSZNU     Type & Screen (If Applicable):  No results found for: LABABO, [address]    Anesthesia Evaluation  Patient summary reviewed no history of anesthetic complications:   Airway: Mallampati: II  TM distance: >3 FB   Neck ROM: full  Mouth opening: > = 3 FB Dental: normal exam         Pulmonary:Negative Pulmonary ROS and normal exam  breath sounds clear to auscultation      (-) pneumonia, COPD, asthma, shortness of breath, recent URI, sleep apnea, rhonchi, wheezes, rales, stridor and not a current smoker          Patient did not smoke on day of surgery.                  Cardiovascular:  Exercise tolerance: good (>4 METS),   (+) hypertension: no interval change,     (-) pacemaker,

## 2018-10-02 ENCOUNTER — PROCEDURE VISIT (OUTPATIENT)
Dept: OBGYN CLINIC | Age: 68
End: 2018-10-02
Payer: MEDICARE

## 2018-10-02 ENCOUNTER — HOSPITAL ENCOUNTER (OUTPATIENT)
Age: 68
Setting detail: SPECIMEN
Discharge: HOME | End: 2018-10-02
Payer: MEDICARE

## 2018-10-02 VITALS
BODY MASS INDEX: 42.69 KG/M2 | DIASTOLIC BLOOD PRESSURE: 78 MMHG | HEIGHT: 62 IN | SYSTOLIC BLOOD PRESSURE: 118 MMHG | WEIGHT: 232 LBS

## 2018-10-02 DIAGNOSIS — B97.7 HPV IN FEMALE: Primary | ICD-10-CM

## 2018-10-02 PROCEDURE — 57454 BX/CURETT OF CERVIX W/SCOPE: CPT | Performed by: OBSTETRICS & GYNECOLOGY

## 2018-10-04 LAB — SURGICAL PATHOLOGY REPORT: NORMAL

## 2018-10-11 DIAGNOSIS — N90.89 VULVAR LESION: ICD-10-CM

## 2018-10-11 DIAGNOSIS — N89.8 VAGINAL ITCHING: ICD-10-CM

## 2018-10-11 DIAGNOSIS — N90.4 LICHEN SCLEROSUS OF FEMALE GENITALIA: ICD-10-CM

## 2018-10-12 RX ORDER — CLOBETASOL PROPIONATE 0.5 MG/G
CREAM TOPICAL
Qty: 15 G | Refills: 0 | Status: SHIPPED | OUTPATIENT
Start: 2018-10-12 | End: 2018-12-19 | Stop reason: SDUPTHER

## 2018-12-04 ENCOUNTER — HOSPITAL ENCOUNTER (OUTPATIENT)
Dept: PREADMISSION TESTING | Age: 68
Discharge: HOME | End: 2018-12-08
Payer: MEDICARE

## 2018-12-04 ENCOUNTER — HOSPITAL ENCOUNTER (OUTPATIENT)
Dept: GENERAL RADIOLOGY | Age: 68
Discharge: HOME | End: 2018-12-06
Payer: MEDICARE

## 2018-12-04 VITALS
OXYGEN SATURATION: 100 % | RESPIRATION RATE: 12 BRPM | HEART RATE: 75 BPM | HEIGHT: 62 IN | WEIGHT: 239 LBS | BODY MASS INDEX: 43.98 KG/M2 | TEMPERATURE: 97.7 F | DIASTOLIC BLOOD PRESSURE: 68 MMHG | SYSTOLIC BLOOD PRESSURE: 132 MMHG

## 2018-12-04 DIAGNOSIS — Z87.410 HISTORY OF CERVICAL DYSPLASIA: Primary | ICD-10-CM

## 2018-12-04 DIAGNOSIS — Z87.410 HISTORY OF CERVICAL DYSPLASIA: ICD-10-CM

## 2018-12-04 LAB
ABSOLUTE EOS #: 0.3 K/UL (ref 0–0.4)
ABSOLUTE IMMATURE GRANULOCYTE: ABNORMAL K/UL (ref 0–0.3)
ABSOLUTE LYMPH #: 1.4 K/UL (ref 1–4.8)
ABSOLUTE MONO #: 0.6 K/UL (ref 0.1–1.3)
ANION GAP SERPL CALCULATED.3IONS-SCNC: 13 MMOL/L (ref 9–17)
BASOPHILS # BLD: 0 % (ref 0–2)
BASOPHILS ABSOLUTE: 0 K/UL (ref 0–0.2)
BILIRUBIN URINE: NEGATIVE
BUN BLDV-MCNC: 21 MG/DL (ref 8–23)
BUN/CREAT BLD: ABNORMAL (ref 9–20)
CALCIUM SERPL-MCNC: 9.1 MG/DL (ref 8.6–10.4)
CHLORIDE BLD-SCNC: 96 MMOL/L (ref 98–107)
CO2: 25 MMOL/L (ref 20–31)
COLOR: YELLOW
COMMENT UA: NORMAL
CREAT SERPL-MCNC: 0.73 MG/DL (ref 0.5–0.9)
DIFFERENTIAL TYPE: ABNORMAL
EKG ATRIAL RATE: 66 BPM
EKG P AXIS: 47 DEGREES
EKG P-R INTERVAL: 166 MS
EKG Q-T INTERVAL: 408 MS
EKG QRS DURATION: 82 MS
EKG QTC CALCULATION (BAZETT): 427 MS
EKG R AXIS: 27 DEGREES
EKG T AXIS: 41 DEGREES
EKG VENTRICULAR RATE: 66 BPM
EOSINOPHILS RELATIVE PERCENT: 4 % (ref 0–4)
GFR AFRICAN AMERICAN: >60 ML/MIN
GFR NON-AFRICAN AMERICAN: >60 ML/MIN
GFR SERPL CREATININE-BSD FRML MDRD: ABNORMAL ML/MIN/{1.73_M2}
GFR SERPL CREATININE-BSD FRML MDRD: ABNORMAL ML/MIN/{1.73_M2}
GLUCOSE BLD-MCNC: 115 MG/DL (ref 70–99)
GLUCOSE URINE: NEGATIVE
HCT VFR BLD CALC: 29.6 % (ref 36–46)
HEMOGLOBIN: 10 G/DL (ref 12–16)
IMMATURE GRANULOCYTES: ABNORMAL %
KETONES, URINE: NEGATIVE
LEUKOCYTE ESTERASE, URINE: NEGATIVE
LYMPHOCYTES # BLD: 20 % (ref 24–44)
MCH RBC QN AUTO: 30 PG (ref 26–34)
MCHC RBC AUTO-ENTMCNC: 33.7 G/DL (ref 31–37)
MCV RBC AUTO: 88.9 FL (ref 80–100)
MONOCYTES # BLD: 9 % (ref 1–7)
NITRITE, URINE: NEGATIVE
NRBC AUTOMATED: ABNORMAL PER 100 WBC
PDW BLD-RTO: 14.2 % (ref 11.5–14.9)
PH UA: 7 (ref 5–8)
PLATELET # BLD: 375 K/UL (ref 150–450)
PLATELET ESTIMATE: ABNORMAL
PMV BLD AUTO: 7 FL (ref 6–12)
POTASSIUM SERPL-SCNC: 4.9 MMOL/L (ref 3.7–5.3)
PROTEIN UA: NEGATIVE
RBC # BLD: 3.33 M/UL (ref 4–5.2)
RBC # BLD: ABNORMAL 10*6/UL
SEG NEUTROPHILS: 67 % (ref 36–66)
SEGMENTED NEUTROPHILS ABSOLUTE COUNT: 4.5 K/UL (ref 1.3–9.1)
SODIUM BLD-SCNC: 134 MMOL/L (ref 135–144)
SPECIFIC GRAVITY UA: 1.01 (ref 1–1.03)
TURBIDITY: CLEAR
URINE HGB: NEGATIVE
UROBILINOGEN, URINE: NORMAL
WBC # BLD: 6.8 K/UL (ref 3.5–11)
WBC # BLD: ABNORMAL 10*3/UL

## 2018-12-04 PROCEDURE — 71046 X-RAY EXAM CHEST 2 VIEWS: CPT

## 2018-12-04 PROCEDURE — 80048 BASIC METABOLIC PNL TOTAL CA: CPT

## 2018-12-04 PROCEDURE — 36415 COLL VENOUS BLD VENIPUNCTURE: CPT

## 2018-12-04 PROCEDURE — 85025 COMPLETE CBC W/AUTO DIFF WBC: CPT

## 2018-12-04 PROCEDURE — 81003 URINALYSIS AUTO W/O SCOPE: CPT

## 2018-12-04 PROCEDURE — 93005 ELECTROCARDIOGRAM TRACING: CPT

## 2018-12-05 ENCOUNTER — ANESTHESIA EVENT (OUTPATIENT)
Dept: OPERATING ROOM | Age: 68
End: 2018-12-05
Payer: MEDICARE

## 2018-12-05 RX ORDER — SODIUM CHLORIDE 0.9 % (FLUSH) 0.9 %
10 SYRINGE (ML) INJECTION PRN
Status: CANCELLED | OUTPATIENT
Start: 2018-12-05

## 2018-12-05 RX ORDER — SODIUM CHLORIDE, SODIUM LACTATE, POTASSIUM CHLORIDE, CALCIUM CHLORIDE 600; 310; 30; 20 MG/100ML; MG/100ML; MG/100ML; MG/100ML
INJECTION, SOLUTION INTRAVENOUS CONTINUOUS
Status: CANCELLED | OUTPATIENT
Start: 2018-12-05

## 2018-12-05 RX ORDER — SODIUM CHLORIDE 0.9 % (FLUSH) 0.9 %
10 SYRINGE (ML) INJECTION EVERY 12 HOURS SCHEDULED
Status: CANCELLED | OUTPATIENT
Start: 2018-12-05

## 2018-12-05 RX ORDER — LIDOCAINE HYDROCHLORIDE 10 MG/ML
1 INJECTION, SOLUTION EPIDURAL; INFILTRATION; INTRACAUDAL; PERINEURAL
Status: CANCELLED | OUTPATIENT
Start: 2018-12-05 | End: 2018-12-05

## 2018-12-18 ENCOUNTER — HOSPITAL ENCOUNTER (OUTPATIENT)
Age: 68
Setting detail: OUTPATIENT SURGERY
Discharge: HOME | End: 2018-12-18
Attending: OBSTETRICS & GYNECOLOGY | Admitting: OBSTETRICS & GYNECOLOGY
Payer: MEDICARE

## 2018-12-18 ENCOUNTER — ANESTHESIA (OUTPATIENT)
Dept: OPERATING ROOM | Age: 68
End: 2018-12-18
Payer: MEDICARE

## 2018-12-18 VITALS
OXYGEN SATURATION: 97 % | DIASTOLIC BLOOD PRESSURE: 54 MMHG | RESPIRATION RATE: 16 BRPM | SYSTOLIC BLOOD PRESSURE: 107 MMHG

## 2018-12-18 VITALS
TEMPERATURE: 98 F | WEIGHT: 239 LBS | BODY MASS INDEX: 43.98 KG/M2 | OXYGEN SATURATION: 99 % | HEART RATE: 85 BPM | RESPIRATION RATE: 16 BRPM | DIASTOLIC BLOOD PRESSURE: 68 MMHG | HEIGHT: 62 IN | SYSTOLIC BLOOD PRESSURE: 127 MMHG

## 2018-12-18 PROBLEM — N87.9 CERVICAL DYSPLASIA: Status: ACTIVE | Noted: 2018-12-18

## 2018-12-18 PROBLEM — Z98.890 H/O LEEP: Status: ACTIVE | Noted: 2018-12-18

## 2018-12-18 LAB
-: NORMAL
GLUCOSE BLD-MCNC: 101 MG/DL (ref 65–105)
REASON FOR REJECTION: NORMAL
ZZ NTE CLEAN UP: ORDERED TEST: NORMAL
ZZ NTE WITH NAME CLEAN UP: SPECIMEN SOURCE: NORMAL

## 2018-12-18 PROCEDURE — 7100000031 HC ASPR PHASE II RECOVERY - ADDTL 15 MIN: Performed by: OBSTETRICS & GYNECOLOGY

## 2018-12-18 PROCEDURE — 7100000000 HC PACU RECOVERY - FIRST 15 MIN: Performed by: OBSTETRICS & GYNECOLOGY

## 2018-12-18 PROCEDURE — 2709999900 HC NON-CHARGEABLE SUPPLY: Performed by: OBSTETRICS & GYNECOLOGY

## 2018-12-18 PROCEDURE — 6360000002 HC RX W HCPCS: Performed by: ANESTHESIOLOGY

## 2018-12-18 PROCEDURE — 2500000003 HC RX 250 WO HCPCS: Performed by: ANESTHESIOLOGY

## 2018-12-18 PROCEDURE — 2580000003 HC RX 258: Performed by: ANESTHESIOLOGY

## 2018-12-18 PROCEDURE — 3700000000 HC ANESTHESIA ATTENDED CARE: Performed by: OBSTETRICS & GYNECOLOGY

## 2018-12-18 PROCEDURE — 36415 COLL VENOUS BLD VENIPUNCTURE: CPT

## 2018-12-18 PROCEDURE — 57522 CONIZATION OF CERVIX: CPT | Performed by: OBSTETRICS & GYNECOLOGY

## 2018-12-18 PROCEDURE — 3600000012 HC SURGERY LEVEL 2 ADDTL 15MIN: Performed by: OBSTETRICS & GYNECOLOGY

## 2018-12-18 PROCEDURE — 82947 ASSAY GLUCOSE BLOOD QUANT: CPT

## 2018-12-18 PROCEDURE — 7100000001 HC PACU RECOVERY - ADDTL 15 MIN: Performed by: OBSTETRICS & GYNECOLOGY

## 2018-12-18 PROCEDURE — 6360000002 HC RX W HCPCS: Performed by: STUDENT IN AN ORGANIZED HEALTH CARE EDUCATION/TRAINING PROGRAM

## 2018-12-18 PROCEDURE — 2500000003 HC RX 250 WO HCPCS: Performed by: OBSTETRICS & GYNECOLOGY

## 2018-12-18 PROCEDURE — 88307 TISSUE EXAM BY PATHOLOGIST: CPT

## 2018-12-18 PROCEDURE — 6370000000 HC RX 637 (ALT 250 FOR IP): Performed by: OBSTETRICS & GYNECOLOGY

## 2018-12-18 PROCEDURE — 3700000001 HC ADD 15 MINUTES (ANESTHESIA): Performed by: OBSTETRICS & GYNECOLOGY

## 2018-12-18 PROCEDURE — 3600000002 HC SURGERY LEVEL 2 BASE: Performed by: OBSTETRICS & GYNECOLOGY

## 2018-12-18 PROCEDURE — 7100000030 HC ASPR PHASE II RECOVERY - FIRST 15 MIN: Performed by: OBSTETRICS & GYNECOLOGY

## 2018-12-18 RX ORDER — LABETALOL HYDROCHLORIDE 5 MG/ML
5 INJECTION, SOLUTION INTRAVENOUS EVERY 10 MIN PRN
Status: DISCONTINUED | OUTPATIENT
Start: 2018-12-18 | End: 2018-12-18 | Stop reason: HOSPADM

## 2018-12-18 RX ORDER — ROCURONIUM BROMIDE 10 MG/ML
INJECTION, SOLUTION INTRAVENOUS PRN
Status: DISCONTINUED | OUTPATIENT
Start: 2018-12-18 | End: 2018-12-18 | Stop reason: SDUPTHER

## 2018-12-18 RX ORDER — LIDOCAINE HYDROCHLORIDE AND EPINEPHRINE BITARTRATE 20; .01 MG/ML; MG/ML
INJECTION, SOLUTION SUBCUTANEOUS PRN
Status: DISCONTINUED | OUTPATIENT
Start: 2018-12-18 | End: 2018-12-18 | Stop reason: HOSPADM

## 2018-12-18 RX ORDER — SODIUM CHLORIDE 0.9 % (FLUSH) 0.9 %
10 SYRINGE (ML) INJECTION PRN
Status: DISCONTINUED | OUTPATIENT
Start: 2018-12-18 | End: 2018-12-18 | Stop reason: HOSPADM

## 2018-12-18 RX ORDER — SUCCINYLCHOLINE CHLORIDE 20 MG/ML
INJECTION INTRAMUSCULAR; INTRAVENOUS PRN
Status: DISCONTINUED | OUTPATIENT
Start: 2018-12-18 | End: 2018-12-18 | Stop reason: SDUPTHER

## 2018-12-18 RX ORDER — FERRIC SUBSULFATE 20-22G/100
SOLUTION, NON-ORAL MISCELLANEOUS PRN
Status: DISCONTINUED | OUTPATIENT
Start: 2018-12-18 | End: 2018-12-18 | Stop reason: HOSPADM

## 2018-12-18 RX ORDER — DEXAMETHASONE SODIUM PHOSPHATE 4 MG/ML
INJECTION, SOLUTION INTRA-ARTICULAR; INTRALESIONAL; INTRAMUSCULAR; INTRAVENOUS; SOFT TISSUE PRN
Status: DISCONTINUED | OUTPATIENT
Start: 2018-12-18 | End: 2018-12-18 | Stop reason: SDUPTHER

## 2018-12-18 RX ORDER — DIPHENHYDRAMINE HYDROCHLORIDE 50 MG/ML
12.5 INJECTION INTRAMUSCULAR; INTRAVENOUS
Status: DISCONTINUED | OUTPATIENT
Start: 2018-12-18 | End: 2018-12-18 | Stop reason: HOSPADM

## 2018-12-18 RX ORDER — FENTANYL CITRATE 50 UG/ML
INJECTION, SOLUTION INTRAMUSCULAR; INTRAVENOUS PRN
Status: DISCONTINUED | OUTPATIENT
Start: 2018-12-18 | End: 2018-12-18 | Stop reason: SDUPTHER

## 2018-12-18 RX ORDER — PROPOFOL 10 MG/ML
INJECTION, EMULSION INTRAVENOUS CONTINUOUS PRN
Status: DISCONTINUED | OUTPATIENT
Start: 2018-12-18 | End: 2018-12-18 | Stop reason: SDUPTHER

## 2018-12-18 RX ORDER — SODIUM CHLORIDE, SODIUM LACTATE, POTASSIUM CHLORIDE, CALCIUM CHLORIDE 600; 310; 30; 20 MG/100ML; MG/100ML; MG/100ML; MG/100ML
INJECTION, SOLUTION INTRAVENOUS CONTINUOUS
Status: DISCONTINUED | OUTPATIENT
Start: 2018-12-18 | End: 2018-12-18 | Stop reason: HOSPADM

## 2018-12-18 RX ORDER — SODIUM CHLORIDE 0.9 % (FLUSH) 0.9 %
10 SYRINGE (ML) INJECTION EVERY 12 HOURS SCHEDULED
Status: DISCONTINUED | OUTPATIENT
Start: 2018-12-18 | End: 2018-12-18 | Stop reason: HOSPADM

## 2018-12-18 RX ORDER — IODINE SOLUTION STRONG 5% (LUGOL'S) 5 %
SOLUTION ORAL PRN
Status: DISCONTINUED | OUTPATIENT
Start: 2018-12-18 | End: 2018-12-18 | Stop reason: HOSPADM

## 2018-12-18 RX ORDER — PROPOFOL 10 MG/ML
INJECTION, EMULSION INTRAVENOUS PRN
Status: DISCONTINUED | OUTPATIENT
Start: 2018-12-18 | End: 2018-12-18 | Stop reason: SDUPTHER

## 2018-12-18 RX ORDER — LIDOCAINE HYDROCHLORIDE 10 MG/ML
INJECTION, SOLUTION EPIDURAL; INFILTRATION; INTRACAUDAL; PERINEURAL PRN
Status: DISCONTINUED | OUTPATIENT
Start: 2018-12-18 | End: 2018-12-18 | Stop reason: SDUPTHER

## 2018-12-18 RX ORDER — ACETIC ACID 5 %
LIQUID (ML) MISCELLANEOUS PRN
Status: DISCONTINUED | OUTPATIENT
Start: 2018-12-18 | End: 2018-12-18 | Stop reason: HOSPADM

## 2018-12-18 RX ORDER — MORPHINE SULFATE 2 MG/ML
2 INJECTION, SOLUTION INTRAMUSCULAR; INTRAVENOUS EVERY 5 MIN PRN
Status: DISCONTINUED | OUTPATIENT
Start: 2018-12-18 | End: 2018-12-18 | Stop reason: HOSPADM

## 2018-12-18 RX ORDER — ONDANSETRON 2 MG/ML
4 INJECTION INTRAMUSCULAR; INTRAVENOUS
Status: DISCONTINUED | OUTPATIENT
Start: 2018-12-18 | End: 2018-12-18 | Stop reason: HOSPADM

## 2018-12-18 RX ORDER — ONDANSETRON 2 MG/ML
INJECTION INTRAMUSCULAR; INTRAVENOUS PRN
Status: DISCONTINUED | OUTPATIENT
Start: 2018-12-18 | End: 2018-12-18 | Stop reason: SDUPTHER

## 2018-12-18 RX ORDER — LIDOCAINE HYDROCHLORIDE 10 MG/ML
1 INJECTION, SOLUTION EPIDURAL; INFILTRATION; INTRACAUDAL; PERINEURAL
Status: DISCONTINUED | OUTPATIENT
Start: 2018-12-18 | End: 2018-12-18 | Stop reason: HOSPADM

## 2018-12-18 RX ORDER — SODIUM CHLORIDE, SODIUM LACTATE, POTASSIUM CHLORIDE, CALCIUM CHLORIDE 600; 310; 30; 20 MG/100ML; MG/100ML; MG/100ML; MG/100ML
INJECTION, SOLUTION INTRAVENOUS CONTINUOUS PRN
Status: DISCONTINUED | OUTPATIENT
Start: 2018-12-18 | End: 2018-12-18 | Stop reason: SDUPTHER

## 2018-12-18 RX ORDER — MEPERIDINE HYDROCHLORIDE 50 MG/ML
12.5 INJECTION INTRAMUSCULAR; INTRAVENOUS; SUBCUTANEOUS EVERY 5 MIN PRN
Status: DISCONTINUED | OUTPATIENT
Start: 2018-12-18 | End: 2018-12-18 | Stop reason: HOSPADM

## 2018-12-18 RX ADMIN — ROCURONIUM BROMIDE 5 MG: 10 INJECTION INTRAVENOUS at 09:53

## 2018-12-18 RX ADMIN — FENTANYL CITRATE 100 MCG: 50 INJECTION, SOLUTION INTRAMUSCULAR; INTRAVENOUS at 09:53

## 2018-12-18 RX ADMIN — PROPOFOL 150 MG: 10 INJECTION, EMULSION INTRAVENOUS at 09:53

## 2018-12-18 RX ADMIN — Medication 2 G: at 09:53

## 2018-12-18 RX ADMIN — SODIUM CHLORIDE, POTASSIUM CHLORIDE, SODIUM LACTATE AND CALCIUM CHLORIDE: 600; 310; 30; 20 INJECTION, SOLUTION INTRAVENOUS at 09:53

## 2018-12-18 RX ADMIN — SUCCINYLCHOLINE CHLORIDE 140 MG: 20 INJECTION INTRAMUSCULAR; INTRAVENOUS at 09:53

## 2018-12-18 RX ADMIN — PROPOFOL 180 MCG/KG/MIN: 10 INJECTION, EMULSION INTRAVENOUS at 09:50

## 2018-12-18 RX ADMIN — SODIUM CHLORIDE, POTASSIUM CHLORIDE, SODIUM LACTATE AND CALCIUM CHLORIDE: 600; 310; 30; 20 INJECTION, SOLUTION INTRAVENOUS at 06:46

## 2018-12-18 RX ADMIN — DEXAMETHASONE SODIUM PHOSPHATE 4 MG: 4 INJECTION, SOLUTION INTRAMUSCULAR; INTRAVENOUS at 10:30

## 2018-12-18 RX ADMIN — ONDANSETRON 4 MG: 2 INJECTION INTRAMUSCULAR; INTRAVENOUS at 10:30

## 2018-12-18 RX ADMIN — LIDOCAINE HYDROCHLORIDE 5 ML: 10 INJECTION, SOLUTION EPIDURAL; INFILTRATION; INTRACAUDAL; PERINEURAL at 09:53

## 2018-12-18 ASSESSMENT — PULMONARY FUNCTION TESTS
PIF_VALUE: 23
PIF_VALUE: 22
PIF_VALUE: 19
PIF_VALUE: 20
PIF_VALUE: 23
PIF_VALUE: 24
PIF_VALUE: 25
PIF_VALUE: 24
PIF_VALUE: 21
PIF_VALUE: 23
PIF_VALUE: 23
PIF_VALUE: 20
PIF_VALUE: 24
PIF_VALUE: 1
PIF_VALUE: 23
PIF_VALUE: 8
PIF_VALUE: 3
PIF_VALUE: 24
PIF_VALUE: 3
PIF_VALUE: 23
PIF_VALUE: 21
PIF_VALUE: 21
PIF_VALUE: 23
PIF_VALUE: 24
PIF_VALUE: 22
PIF_VALUE: 22
PIF_VALUE: 21
PIF_VALUE: 4
PIF_VALUE: 3
PIF_VALUE: 43
PIF_VALUE: 20
PIF_VALUE: 22
PIF_VALUE: 5
PIF_VALUE: 20
PIF_VALUE: 23
PIF_VALUE: 24
PIF_VALUE: 36
PIF_VALUE: 23
PIF_VALUE: 1
PIF_VALUE: 3
PIF_VALUE: 20
PIF_VALUE: 22
PIF_VALUE: 23

## 2018-12-18 ASSESSMENT — PAIN SCALES - GENERAL: PAINLEVEL_OUTOF10: 0

## 2018-12-18 ASSESSMENT — ENCOUNTER SYMPTOMS
STRIDOR: 0
SHORTNESS OF BREATH: 0

## 2018-12-18 ASSESSMENT — LIFESTYLE VARIABLES: SMOKING_STATUS: 0

## 2018-12-18 ASSESSMENT — PAIN - FUNCTIONAL ASSESSMENT: PAIN_FUNCTIONAL_ASSESSMENT: 0-10

## 2018-12-18 NOTE — ANESTHESIA PRE PROCEDURE
Current Facility-Administered Medications   Medication Dose Route Frequency Provider Last Rate Last Dose    lactated ringers infusion   Intravenous Continuous Allyson Ramirez  mL/hr at 12/18/18 0646      lidocaine PF 1 % injection 1 mL  1 mL Intradermal Once PRN Allyson Ramirez MD        sodium chloride flush 0.9 % injection 10 mL  10 mL Intravenous 2 times per day Allyson Ramirez MD        sodium chloride flush 0.9 % injection 10 mL  10 mL Intravenous PRN Allyson Ramirez MD           Allergies: Allergies   Allergen Reactions    Bee Venom     Codeine     Seasonal        Problem List:    Patient Active Problem List   Diagnosis Code    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    History of cervical dysplasia V74.244    Lichen sclerosus of female genitalia N90.4       Past Medical History:        Diagnosis Date    Abnormal Pap smear of cervix     Small's esophagus     Chronic back pain     Chronic sinusitis     Diabetes mellitus (HCC)     Environmental allergies     GERD (gastroesophageal reflux disease)     Hyperlipidemia     Hypertension     OA (osteoarthritis of spine)     generalized.  Wears glasses     Wears glasses        Past Surgical History:        Procedure Laterality Date    APPENDECTOMY      CHOLECYSTECTOMY      COLONOSCOPY      COLPOSCOPY  06/15/2017    dr bella- has had previous colp before    ENDOSCOPY, COLON, DIAGNOSTIC      EGD    ENDOSCOPY, COLON, DIAGNOSTIC  09-05-14    barretts esoph, milliary lesions duodenal bulb, hiatal hernia, healed antral ulcer.     FOOT SURGERY Right     foreign body removed    JOINT REPLACEMENT Bilateral     knees    OH COLONOSCOPY FLX DX W/COLLJ SPEC WHEN PFRMD N/A 9/21/2018    COLONOSCOPY performed by Natasha Joel MD at [address] [place] [address] ENDOSCOPY  9/11/15    BARRETTS       Social History:    Social History   Substance Use Topics    Smoking status: Former Smoker     Quit date: 1/4/2011    Smokeless tobacco: Never Used

## 2018-12-19 DIAGNOSIS — N90.89 VULVAR LESION: ICD-10-CM

## 2018-12-19 DIAGNOSIS — N89.8 VAGINAL ITCHING: ICD-10-CM

## 2018-12-19 DIAGNOSIS — N90.4 LICHEN SCLEROSUS OF FEMALE GENITALIA: ICD-10-CM

## 2018-12-19 RX ORDER — CLOBETASOL PROPIONATE 0.5 MG/G
CREAM TOPICAL
Qty: 15 G | Refills: 0 | Status: SHIPPED | OUTPATIENT
Start: 2018-12-19 | End: 2019-01-29 | Stop reason: SDUPTHER

## 2018-12-21 LAB — SURGICAL PATHOLOGY REPORT: NORMAL

## 2019-01-02 ENCOUNTER — OFFICE VISIT (OUTPATIENT)
Dept: OBGYN CLINIC | Age: 69
End: 2019-01-02

## 2019-01-02 VITALS — SYSTOLIC BLOOD PRESSURE: 128 MMHG | WEIGHT: 239 LBS | DIASTOLIC BLOOD PRESSURE: 62 MMHG | BODY MASS INDEX: 43.71 KG/M2

## 2019-01-02 DIAGNOSIS — Z98.890 H/O LEEP: Primary | ICD-10-CM

## 2019-01-02 PROCEDURE — 99024 POSTOP FOLLOW-UP VISIT: CPT | Performed by: OBSTETRICS & GYNECOLOGY

## 2019-01-29 DIAGNOSIS — N90.4 LICHEN SCLEROSUS OF FEMALE GENITALIA: ICD-10-CM

## 2019-01-29 DIAGNOSIS — N89.8 VAGINAL ITCHING: ICD-10-CM

## 2019-01-29 DIAGNOSIS — N90.89 VULVAR LESION: ICD-10-CM

## 2019-01-29 RX ORDER — CLOBETASOL PROPIONATE 0.5 MG/G
CREAM TOPICAL
Qty: 15 G | Refills: 0 | Status: SHIPPED | OUTPATIENT
Start: 2019-01-29 | End: 2019-04-01 | Stop reason: SDUPTHER

## 2019-04-01 DIAGNOSIS — N90.4 LICHEN SCLEROSUS OF FEMALE GENITALIA: ICD-10-CM

## 2019-04-01 DIAGNOSIS — N89.8 VAGINAL ITCHING: ICD-10-CM

## 2019-04-01 DIAGNOSIS — N90.89 VULVAR LESION: ICD-10-CM

## 2019-04-01 RX ORDER — CLOBETASOL PROPIONATE 0.5 MG/G
CREAM TOPICAL
Qty: 15 G | Refills: 0 | Status: SHIPPED | OUTPATIENT
Start: 2019-04-01 | End: 2019-06-04 | Stop reason: SDUPTHER

## 2019-05-01 ENCOUNTER — OFFICE VISIT (OUTPATIENT)
Dept: OBGYN CLINIC | Age: 69
End: 2019-05-01
Payer: MEDICARE

## 2019-05-01 VITALS
SYSTOLIC BLOOD PRESSURE: 120 MMHG | BODY MASS INDEX: 44.16 KG/M2 | DIASTOLIC BLOOD PRESSURE: 82 MMHG | RESPIRATION RATE: 20 BRPM | HEIGHT: 62 IN | WEIGHT: 240 LBS

## 2019-05-01 DIAGNOSIS — N87.9 CERVICAL DYSPLASIA: Primary | ICD-10-CM

## 2019-05-01 DIAGNOSIS — Z01.818 PREOP TESTING: ICD-10-CM

## 2019-05-01 PROCEDURE — G8400 PT W/DXA NO RESULTS DOC: HCPCS | Performed by: OBSTETRICS & GYNECOLOGY

## 2019-05-01 PROCEDURE — 3017F COLORECTAL CA SCREEN DOC REV: CPT | Performed by: OBSTETRICS & GYNECOLOGY

## 2019-05-01 PROCEDURE — 1036F TOBACCO NON-USER: CPT | Performed by: OBSTETRICS & GYNECOLOGY

## 2019-05-01 PROCEDURE — 4040F PNEUMOC VAC/ADMIN/RCVD: CPT | Performed by: OBSTETRICS & GYNECOLOGY

## 2019-05-01 PROCEDURE — 1123F ACP DISCUSS/DSCN MKR DOCD: CPT | Performed by: OBSTETRICS & GYNECOLOGY

## 2019-05-01 PROCEDURE — G8427 DOCREV CUR MEDS BY ELIG CLIN: HCPCS | Performed by: OBSTETRICS & GYNECOLOGY

## 2019-05-01 PROCEDURE — G8417 CALC BMI ABV UP PARAM F/U: HCPCS | Performed by: OBSTETRICS & GYNECOLOGY

## 2019-05-01 PROCEDURE — 99214 OFFICE O/P EST MOD 30 MIN: CPT | Performed by: OBSTETRICS & GYNECOLOGY

## 2019-05-01 PROCEDURE — 1090F PRES/ABSN URINE INCON ASSESS: CPT | Performed by: OBSTETRICS & GYNECOLOGY

## 2019-05-01 NOTE — PROGRESS NOTES
SHORT FORM HISTORY AND PHYSICAL      Hospital: [address] [facility], [place]  1950  Primary Care Physician: Birder Krabbe, MD    5/1/2019    HPI: Faustina Keita is a 78 y.o. female     History of recurrent dysplasia and abnormal pap smears. Recently LEEP done and no invasive cancer noted. She has had recurrent abnormal pap smears with colposcopy and LEEP. She states she is having no postmenopausal bleeding. Recommend TVUS and EMB. Pt. Aware this could complicate surgery or require her to need second surgery if not done. History of bilateral knee replacement, HTN, osteoarthritis, Chronic LBP, DM II      Relevant Past History:   Patient Active Problem List    Diagnosis Date Noted    LEEP w/Top Hat 12/18/18 12/18/2018    Cervical dysplasia 48/70/1448    Lichen sclerosus of female genitalia 07/26/2018    HPV in female 05/22/2017 2017 Normal Pap Smear- + HPV Type 18      History of cervical dysplasia 05/22/2017     Cervical conization x2           OB History   No data available       Past Medical History:   Diagnosis Date    Abnormal Pap smear of cervix     Small's esophagus     Chronic back pain     Chronic sinusitis     Diabetes mellitus ([address]     Environmental allergies     GERD (gastroesophageal reflux disease)     Hyperlipidemia     Hypertension     OA (osteoarthritis of spine)     generalized.  Wears glasses     Wears glasses        Past Surgical History:   Procedure Laterality Date    APPENDECTOMY      CHOLECYSTECTOMY      COLONOSCOPY      COLPOSCOPY  06/15/2017    dr bella- has had previous colp before    ENDOSCOPY, COLON, DIAGNOSTIC      EGD    ENDOSCOPY, COLON, DIAGNOSTIC  09-05-14    barretts esoph, milliary lesions duodenal bulb, hiatal hernia, healed antral ulcer.     FOOT SURGERY Right     foreign body removed    JOINT REPLACEMENT Bilateral     knees    LEEP N/A 12/18/2018    LEEP performed by Terrance iRvera DO at [address] (MOBIC) 15 MG tablet TK 1 T PO QD  0    PROAIR  (90 BASE) MCG/ACT inhaler       EPIPEN 2-ELISEO 0.3 MG/0.3ML SOAJ injection       NEXIUM 40 MG delayed release capsule       ACCU-CHEK ANNABEL PLUS strip       Accu-Chek Multiclix Lancets MISC       HYDROcodone-acetaminophen (NORCO) 5-325 MG per tablet TK 1 T PO TID PRN MUST LAST 30 DAYS  0    metFORMIN (GLUCOPHAGE) 1000 MG tablet Take 1,000 mg by mouth 2 times daily (with meals)       CALCIUM-VITAMIN D PO Take 1 tablet by mouth daily 1200mg/1000mg      aspirin 81 MG tablet Take 81 mg by mouth daily.  lisinopril-hydrochlorothiazide (PRINZIDE;ZESTORETIC) 20-12.5 MG per tablet Take 1 tablet by mouth daily.  Multiple Vitamins-Minerals (THERAPEUTIC MULTIVITAMIN-MINERALS) tablet Take 1 tablet by mouth daily. No current facility-administered medications for this visit. ALLERGIES:  Allergies as of 05/01/2019 - Review Complete 05/01/2019   Allergen Reaction Noted    Bee venom  08/22/2014    Codeine  03/22/2018    Seasonal  06/15/2017           REVIEW OF SYSTEMS:       General appearance:Appears healthy. Alert; in no acute distress. Pleasant. CARDIOVASCULAR: Denies: chest pain, dyspnea on exertion, palpitations  RESPIRATORY: Denies: cough, shortness of breath, wheezing  GI: Denies: abdominal pain, flank pain, nausea, vomiting, diarrhea  : Denies: dysuria, frequency/urgency, hematuria, pelvic pain, .    vaginal bleeding; No  MUSCULOSKELETAL: Denies: back pain, joint swelling, muscle pain  SKIN: Denies: rash, hives,  Hematologic: Denies Bleeding Disorder, Coagulopathy or Transfusion    PHYSICAL EXAM:  Blood pressure 120/82, resp. rate 20, height 5' 2\" (1.575 m), weight 240 lb (108.9 kg), not currently breastfeeding.     General Appearance:  awake, alert, oriented, in no acute distress, well developed, well nourished, in no acute distress   Skin:  Skin color, texture, turgor normal. No rashes or lesions  Mouth/Throat:  Mucosa moist.  No lesions. Pharynx without erythema, edema or exudate. Lungs:  Normal expansion. Clear to auscultation. No rales, rhonchi, or wheezing. Heart:  Heart sounds are normal.  Regular rate and rhythm without murmur, gallop or rub. Breast:  Inspection negative. No nipple discharge or bleeding. No palpable mass  Abdomen:  Soft, non-tender, normal bowel sounds. No bruits, organomegaly or masses. Extremities: Extremities warm to touch, pink, with no edema. Musculoskeletal:  negative  Peripheral Pulses:  Normal  Neurologic:  Gait normal. Reflexes normal and symmetric. Sensation grossly intact. Female Urogen:  External genitalia, vagina and cervix appear normal and without lesions. Bimanual exam: no adnexal masses, uterine enlargement or tenderness noted. OMM Structural Component:  The patient did not complain of a Chief complaint requiring OMM. Chief Complaint:none    Structural Exam: Refused    Diagnostics:  No results found. Lab Results:  Results for orders placed or performed during the hospital encounter of 12/18/18   SPECIMEN REJECTION   Result Value Ref Range    Specimen Source . BLOOD     Ordered Test BMP     Reason for Rejection Unable to perform testing: Specimen hemolyzed. - NOT REPORTED    Surgical Pathology   Result Value Ref Range    Surgical Pathology Report       (NOTE)  [id number]  [address]  [address] [facility].   [place], [address] [place] [address]  [phone]  Fax: [phone]  SURGICAL PATHOLOGY REPORT    Patient Name: Ryan Pham  MR#: [id number]  Specimen #[id number]      Final Diagnosis  SPECIMEN \"A\":  SUPERIOR ECTOCERVIX, LEEP CONE BIOPSY:        FOCAL LOW GRADE SQUAMOUS INTRAEPITHELIAL LESION (MAGGIE-1) WITH  CELLULAR FEATURES OF HPV INFECTION            NO ENDOCERVICAL MUCOSA PRESENT           SPECIMEN \"B\":  ECTOCERVIX, LEEP CONE BIOPSY:        FOCAL LOW GRADE SQUAMOUS INTRAEPITHELIAL LESION (MAGGIE-1) WITH  CELLULAR FEATURES OF HPV INFECTION    NO ENDOCERVICAL MUCOSA 1. Cervical dysplasia     2. Preop testing  CBC Auto Differential    TSH With Reflex Ft4    Hemoglobin A1C    Comprehensive Metabolic Panel       PAP: normal + HR HPV 7/2018, Colposcopy MAGGIE 1, LEEP MAGGIE 1  Endometrial Sampling: pt aware of recommendation. No completed. She is aware it may require her to have second procedure  Permanent Sterilization Completed: Yes     Plan:  KAILEE w/ BSO cysto for recurrent abnormal pap smears and recurrent cervical dysplasia  Discussed risk of procedure given multiple comorbidities. She understands. Will need PCP clearnace prior to procedure. Orders Placed This Encounter   Procedures    CBC Auto Differential     Standing Status:   Future     Standing Expiration Date:   5/1/2020    TSH With Reflex Ft4     Standing Status:   Future     Standing Expiration Date:   5/1/2020    Hemoglobin A1C     Standing Status:   Future     Standing Expiration Date:   5/1/2020    Comprehensive Metabolic Panel     Standing Status:   Future     Standing Expiration Date:   5/1/2020         Counseling: The patient was counseled on all options both medical and surgical, conservative as well as definitive. She has elected to proceed with the procedure as stated above. The patient was counseled on the procedure. Risks and complications were reviewed in detail. The patients orders, labs, consents have been completed. The history and physical as well as all supporting surgical documentation will be forwarded to the pre-operative holding area. The patient is aware that this procedure may not alleviate her symptoms. That there may be a necessity for a second surgery and that there may be an incomplete removal of abnormal tissue.          ________________________________________D.O./M.D. Date:____________  Yuniel Ang DO        Patient was seen with total face to face time of 25 minutes. More than 50% of this visit was on counseling and education regarding her    Diagnosis Orders   1.  Cervical dysplasia     2. Preop testing  CBC Auto Differential    TSH With Reflex Ft4    Hemoglobin A1C    Comprehensive Metabolic Panel    and her options. She was also counseled on her preventative health maintenance recommendations and follow-up.

## 2019-05-02 ENCOUNTER — HOSPITAL ENCOUNTER (OUTPATIENT)
Dept: ULTRASOUND IMAGING | Age: 69
Discharge: HOME | End: 2019-05-04
Payer: MEDICARE

## 2019-05-02 ENCOUNTER — HOSPITAL ENCOUNTER (OUTPATIENT)
Dept: PREADMISSION TESTING | Age: 69
Discharge: HOME | End: 2019-05-06
Payer: MEDICARE

## 2019-05-02 ENCOUNTER — TELEPHONE (OUTPATIENT)
Dept: OBGYN CLINIC | Age: 69
End: 2019-05-02

## 2019-05-02 VITALS
BODY MASS INDEX: 43.05 KG/M2 | WEIGHT: 243 LBS | OXYGEN SATURATION: 98 % | HEIGHT: 63 IN | DIASTOLIC BLOOD PRESSURE: 58 MMHG | SYSTOLIC BLOOD PRESSURE: 117 MMHG | HEART RATE: 75 BPM | RESPIRATION RATE: 18 BRPM | TEMPERATURE: 97.5 F

## 2019-05-02 DIAGNOSIS — Z01.818 PRE-OP TESTING: Primary | ICD-10-CM

## 2019-05-02 DIAGNOSIS — Z01.818 PRE-OP TESTING: ICD-10-CM

## 2019-05-02 LAB
ABO/RH: NORMAL
ABSOLUTE EOS #: 0.2 K/UL (ref 0–0.4)
ABSOLUTE IMMATURE GRANULOCYTE: ABNORMAL K/UL (ref 0–0.3)
ABSOLUTE LYMPH #: 1.3 K/UL (ref 1–4.8)
ABSOLUTE MONO #: 0.4 K/UL (ref 0.1–1.3)
ALBUMIN SERPL-MCNC: 4.3 G/DL (ref 3.5–5.2)
ALBUMIN/GLOBULIN RATIO: ABNORMAL (ref 1–2.5)
ALP BLD-CCNC: 72 U/L (ref 35–104)
ALT SERPL-CCNC: 15 U/L (ref 5–33)
ANION GAP SERPL CALCULATED.3IONS-SCNC: 13 MMOL/L (ref 9–17)
ANTIBODY SCREEN: NEGATIVE
ARM BAND NUMBER: NORMAL
AST SERPL-CCNC: 19 U/L
BASOPHILS # BLD: 0 % (ref 0–2)
BASOPHILS ABSOLUTE: 0 K/UL (ref 0–0.2)
BILIRUB SERPL-MCNC: 0.28 MG/DL (ref 0.3–1.2)
BILIRUBIN URINE: NEGATIVE
BLOOD BANK COMMENT: NORMAL
BUN BLDV-MCNC: 17 MG/DL (ref 8–23)
BUN/CREAT BLD: ABNORMAL (ref 9–20)
CALCIUM SERPL-MCNC: 9.1 MG/DL (ref 8.6–10.4)
CHLORIDE BLD-SCNC: 93 MMOL/L (ref 98–107)
CO2: 24 MMOL/L (ref 20–31)
COLOR: YELLOW
COMMENT UA: NORMAL
CREAT SERPL-MCNC: 0.61 MG/DL (ref 0.5–0.9)
DIFFERENTIAL TYPE: ABNORMAL
EOSINOPHILS RELATIVE PERCENT: 4 % (ref 0–4)
ESTIMATED AVERAGE GLUCOSE: 117 MG/DL
EXPIRATION DATE: NORMAL
GFR AFRICAN AMERICAN: >60 ML/MIN
GFR NON-AFRICAN AMERICAN: >60 ML/MIN
GFR SERPL CREATININE-BSD FRML MDRD: ABNORMAL ML/MIN/{1.73_M2}
GFR SERPL CREATININE-BSD FRML MDRD: ABNORMAL ML/MIN/{1.73_M2}
GLUCOSE BLD-MCNC: 102 MG/DL (ref 70–99)
GLUCOSE URINE: NEGATIVE
HBA1C MFR BLD: 5.7 % (ref 4–6)
HCT VFR BLD CALC: 30.4 % (ref 36–46)
HEMOGLOBIN: 10.1 G/DL (ref 12–16)
IMMATURE GRANULOCYTES: ABNORMAL %
INR BLD: 0.9
KETONES, URINE: NEGATIVE
LEUKOCYTE ESTERASE, URINE: NEGATIVE
LYMPHOCYTES # BLD: 19 % (ref 24–44)
MCH RBC QN AUTO: 29.4 PG (ref 26–34)
MCHC RBC AUTO-ENTMCNC: 33.1 G/DL (ref 31–37)
MCV RBC AUTO: 88.6 FL (ref 80–100)
MONOCYTES # BLD: 7 % (ref 1–7)
NITRITE, URINE: NEGATIVE
NRBC AUTOMATED: ABNORMAL PER 100 WBC
PARTIAL THROMBOPLASTIN TIME: 37.7 SEC (ref 24–36)
PDW BLD-RTO: 14.3 % (ref 11.5–14.9)
PH UA: 7.5 (ref 5–8)
PLATELET # BLD: 434 K/UL (ref 150–450)
PLATELET ESTIMATE: ABNORMAL
PMV BLD AUTO: 7.2 FL (ref 6–12)
POTASSIUM SERPL-SCNC: 4.7 MMOL/L (ref 3.7–5.3)
PROTEIN UA: NEGATIVE
PROTHROMBIN TIME: 12.4 SEC (ref 11.8–14.6)
RBC # BLD: 3.43 M/UL (ref 4–5.2)
RBC # BLD: ABNORMAL 10*6/UL
SEG NEUTROPHILS: 70 % (ref 36–66)
SEGMENTED NEUTROPHILS ABSOLUTE COUNT: 4.6 K/UL (ref 1.3–9.1)
SODIUM BLD-SCNC: 130 MMOL/L (ref 135–144)
SPECIFIC GRAVITY UA: 1 (ref 1–1.03)
THYROXINE, FREE: 1.11 NG/DL (ref 0.93–1.7)
TOTAL PROTEIN: 7.5 G/DL (ref 6.4–8.3)
TSH SERPL DL<=0.05 MIU/L-ACNC: 7.12 MIU/L (ref 0.3–5)
TURBIDITY: CLEAR
URINE HGB: NEGATIVE
UROBILINOGEN, URINE: NORMAL
WBC # BLD: 6.7 K/UL (ref 3.5–11)
WBC # BLD: ABNORMAL 10*3/UL

## 2019-05-02 PROCEDURE — 76830 TRANSVAGINAL US NON-OB: CPT

## 2019-05-02 PROCEDURE — 85610 PROTHROMBIN TIME: CPT

## 2019-05-02 PROCEDURE — 86900 BLOOD TYPING SEROLOGIC ABO: CPT

## 2019-05-02 PROCEDURE — 81003 URINALYSIS AUTO W/O SCOPE: CPT

## 2019-05-02 PROCEDURE — 85025 COMPLETE CBC W/AUTO DIFF WBC: CPT

## 2019-05-02 PROCEDURE — 85730 THROMBOPLASTIN TIME PARTIAL: CPT

## 2019-05-02 PROCEDURE — 36415 COLL VENOUS BLD VENIPUNCTURE: CPT

## 2019-05-02 PROCEDURE — 86850 RBC ANTIBODY SCREEN: CPT

## 2019-05-02 PROCEDURE — 86901 BLOOD TYPING SEROLOGIC RH(D): CPT

## 2019-05-02 PROCEDURE — 83036 HEMOGLOBIN GLYCOSYLATED A1C: CPT

## 2019-05-02 PROCEDURE — 76856 US EXAM PELVIC COMPLETE: CPT

## 2019-05-02 PROCEDURE — 84439 ASSAY OF FREE THYROXINE: CPT

## 2019-05-02 PROCEDURE — 80053 COMPREHEN METABOLIC PANEL: CPT

## 2019-05-02 PROCEDURE — 84443 ASSAY THYROID STIM HORMONE: CPT

## 2019-05-02 RX ORDER — MISOPROSTOL 100 UG/1
TABLET ORAL
Qty: 2 TABLET | Refills: 0 | Status: CANCELLED | OUTPATIENT
Start: 2019-05-02

## 2019-05-02 RX ORDER — AMLODIPINE BESYLATE 5 MG/1
5 TABLET ORAL DAILY
COMMUNITY

## 2019-05-02 NOTE — TELEPHONE ENCOUNTER
----- Message from Mariluz Silva DO sent at 5/2/2019 12:42 PM EDT -----  Endometrial stripe is <4mm, no indication for endoemtrial biopsy. OK to cancel this and to proceed with hysterectomy. Thank you.

## 2019-05-02 NOTE — TELEPHONE ENCOUNTER
Pt was informed of needing u.s and EMB- explained why we do this prior to all surgeries and the importance- pt agreed to u.s and [id number] is scheduled for today- emb Tuesday- told pt may need cytotec and the reason why- pt stated she understood and explained how to take rx-     Rx pending for cytotec

## 2019-05-03 ENCOUNTER — ANESTHESIA EVENT (OUTPATIENT)
Dept: OPERATING ROOM | Age: 69
End: 2019-05-03
Payer: MEDICARE

## 2019-05-03 RX ORDER — SCOLOPAMINE TRANSDERMAL SYSTEM 1 MG/1
1 PATCH, EXTENDED RELEASE TRANSDERMAL
Status: CANCELLED | OUTPATIENT
Start: 2019-05-03 | End: 2019-05-06

## 2019-05-03 RX ORDER — SODIUM CHLORIDE 0.9 % (FLUSH) 0.9 %
10 SYRINGE (ML) INJECTION PRN
Status: CANCELLED | OUTPATIENT
Start: 2019-05-03

## 2019-05-03 RX ORDER — SODIUM CHLORIDE, SODIUM LACTATE, POTASSIUM CHLORIDE, CALCIUM CHLORIDE 600; 310; 30; 20 MG/100ML; MG/100ML; MG/100ML; MG/100ML
INJECTION, SOLUTION INTRAVENOUS CONTINUOUS
Status: CANCELLED | OUTPATIENT
Start: 2019-05-03

## 2019-05-03 RX ORDER — LIDOCAINE HYDROCHLORIDE 10 MG/ML
1 INJECTION, SOLUTION EPIDURAL; INFILTRATION; INTRACAUDAL; PERINEURAL
Status: CANCELLED | OUTPATIENT
Start: 2019-05-03 | End: 2019-05-03

## 2019-05-03 RX ORDER — SODIUM CHLORIDE 0.9 % (FLUSH) 0.9 %
10 SYRINGE (ML) INJECTION EVERY 12 HOURS SCHEDULED
Status: CANCELLED | OUTPATIENT
Start: 2019-05-03

## 2019-05-16 ENCOUNTER — HOSPITAL ENCOUNTER (OUTPATIENT)
Age: 69
Setting detail: OBSERVATION
Discharge: HOME | End: 2019-05-17
Attending: OBSTETRICS & GYNECOLOGY | Admitting: OBSTETRICS & GYNECOLOGY
Payer: MEDICARE

## 2019-05-16 ENCOUNTER — ANESTHESIA (OUTPATIENT)
Dept: OPERATING ROOM | Age: 69
End: 2019-05-16
Payer: MEDICARE

## 2019-05-16 VITALS — OXYGEN SATURATION: 100 % | TEMPERATURE: 95.2 F | SYSTOLIC BLOOD PRESSURE: 129 MMHG | DIASTOLIC BLOOD PRESSURE: 64 MMHG

## 2019-05-16 DIAGNOSIS — Z90.710 S/P HYSTERECTOMY: ICD-10-CM

## 2019-05-16 DIAGNOSIS — Z90.710 S/P HYSTERECTOMY: Primary | ICD-10-CM

## 2019-05-16 PROBLEM — Z98.890 POSTOPERATIVE STATE: Status: ACTIVE | Noted: 2019-05-16

## 2019-05-16 PROBLEM — I10 HYPERTENSION: Status: ACTIVE | Noted: 2019-05-16

## 2019-05-16 PROBLEM — E03.9 HYPOTHYROID: Status: ACTIVE | Noted: 2019-05-16

## 2019-05-16 LAB
BILIRUBIN URINE: NEGATIVE
COLOR: YELLOW
COMMENT UA: NORMAL
GLUCOSE BLD-MCNC: 127 MG/DL (ref 65–105)
GLUCOSE BLD-MCNC: 145 MG/DL (ref 65–105)
GLUCOSE BLD-MCNC: 196 MG/DL (ref 65–105)
GLUCOSE BLD-MCNC: 85 MG/DL (ref 65–105)
GLUCOSE URINE: NEGATIVE
INR BLD: 1
KETONES, URINE: NEGATIVE
LEUKOCYTE ESTERASE, URINE: NEGATIVE
NITRITE, URINE: NEGATIVE
PARTIAL THROMBOPLASTIN TIME: 36.6 SEC (ref 24–36)
PH UA: 7 (ref 5–8)
PROTEIN UA: NEGATIVE
PROTHROMBIN TIME: 12.9 SEC (ref 11.8–14.6)
SPECIFIC GRAVITY UA: 1.01 (ref 1–1.03)
TURBIDITY: CLEAR
URINE HGB: NEGATIVE
UROBILINOGEN, URINE: NORMAL

## 2019-05-16 PROCEDURE — 7100000001 HC PACU RECOVERY - ADDTL 15 MIN: Performed by: OBSTETRICS & GYNECOLOGY

## 2019-05-16 PROCEDURE — 2500000003 HC RX 250 WO HCPCS: Performed by: NURSE ANESTHETIST, CERTIFIED REGISTERED

## 2019-05-16 PROCEDURE — 2780000010 HC IMPLANT OTHER: Performed by: OBSTETRICS & GYNECOLOGY

## 2019-05-16 PROCEDURE — 3700000000 HC ANESTHESIA ATTENDED CARE: Performed by: OBSTETRICS & GYNECOLOGY

## 2019-05-16 PROCEDURE — 3600000009 HC SURGERY ROBOT BASE: Performed by: OBSTETRICS & GYNECOLOGY

## 2019-05-16 PROCEDURE — 2500000003 HC RX 250 WO HCPCS: Performed by: STUDENT IN AN ORGANIZED HEALTH CARE EDUCATION/TRAINING PROGRAM

## 2019-05-16 PROCEDURE — 36415 COLL VENOUS BLD VENIPUNCTURE: CPT

## 2019-05-16 PROCEDURE — 7100000000 HC PACU RECOVERY - FIRST 15 MIN: Performed by: OBSTETRICS & GYNECOLOGY

## 2019-05-16 PROCEDURE — 2580000003 HC RX 258: Performed by: STUDENT IN AN ORGANIZED HEALTH CARE EDUCATION/TRAINING PROGRAM

## 2019-05-16 PROCEDURE — 6360000002 HC RX W HCPCS: Performed by: OBSTETRICS & GYNECOLOGY

## 2019-05-16 PROCEDURE — 88307 TISSUE EXAM BY PATHOLOGIST: CPT

## 2019-05-16 PROCEDURE — 2580000003 HC RX 258: Performed by: ANESTHESIOLOGY

## 2019-05-16 PROCEDURE — 82947 ASSAY GLUCOSE BLOOD QUANT: CPT

## 2019-05-16 PROCEDURE — 2500000003 HC RX 250 WO HCPCS: Performed by: OBSTETRICS & GYNECOLOGY

## 2019-05-16 PROCEDURE — 3600000019 HC SURGERY ROBOT ADDTL 15MIN: Performed by: OBSTETRICS & GYNECOLOGY

## 2019-05-16 PROCEDURE — 6360000002 HC RX W HCPCS: Performed by: ANESTHESIOLOGY

## 2019-05-16 PROCEDURE — 6360000002 HC RX W HCPCS: Performed by: NURSE ANESTHETIST, CERTIFIED REGISTERED

## 2019-05-16 PROCEDURE — 81003 URINALYSIS AUTO W/O SCOPE: CPT

## 2019-05-16 PROCEDURE — 6360000002 HC RX W HCPCS: Performed by: STUDENT IN AN ORGANIZED HEALTH CARE EDUCATION/TRAINING PROGRAM

## 2019-05-16 PROCEDURE — 3700000001 HC ADD 15 MINUTES (ANESTHESIA): Performed by: OBSTETRICS & GYNECOLOGY

## 2019-05-16 PROCEDURE — 85610 PROTHROMBIN TIME: CPT

## 2019-05-16 PROCEDURE — 6370000000 HC RX 637 (ALT 250 FOR IP): Performed by: STUDENT IN AN ORGANIZED HEALTH CARE EDUCATION/TRAINING PROGRAM

## 2019-05-16 PROCEDURE — S2900 ROBOTIC SURGICAL SYSTEM: HCPCS | Performed by: OBSTETRICS & GYNECOLOGY

## 2019-05-16 PROCEDURE — 58552 LAPARO-VAG HYST INCL T/O: CPT | Performed by: OBSTETRICS & GYNECOLOGY

## 2019-05-16 PROCEDURE — 6370000000 HC RX 637 (ALT 250 FOR IP): Performed by: ANESTHESIOLOGY

## 2019-05-16 PROCEDURE — 2709999900 HC NON-CHARGEABLE SUPPLY: Performed by: OBSTETRICS & GYNECOLOGY

## 2019-05-16 PROCEDURE — G0378 HOSPITAL OBSERVATION PER HR: HCPCS

## 2019-05-16 PROCEDURE — 85730 THROMBOPLASTIN TIME PARTIAL: CPT

## 2019-05-16 RX ORDER — SODIUM CHLORIDE 0.9 % (FLUSH) 0.9 %
10 SYRINGE (ML) INJECTION EVERY 12 HOURS SCHEDULED
Status: DISCONTINUED | OUTPATIENT
Start: 2019-05-16 | End: 2019-05-16 | Stop reason: HOSPADM

## 2019-05-16 RX ORDER — DEXTROSE MONOHYDRATE 25 G/50ML
12.5 INJECTION, SOLUTION INTRAVENOUS PRN
Status: DISCONTINUED | OUTPATIENT
Start: 2019-05-16 | End: 2019-05-17 | Stop reason: HOSPADM

## 2019-05-16 RX ORDER — SODIUM CHLORIDE, SODIUM LACTATE, POTASSIUM CHLORIDE, CALCIUM CHLORIDE 600; 310; 30; 20 MG/100ML; MG/100ML; MG/100ML; MG/100ML
INJECTION, SOLUTION INTRAVENOUS CONTINUOUS
Status: DISCONTINUED | OUTPATIENT
Start: 2019-05-16 | End: 2019-05-16

## 2019-05-16 RX ORDER — IBUPROFEN 600 MG/1
600 TABLET ORAL EVERY 8 HOURS PRN
Qty: 120 TABLET | Refills: 0 | Status: SHIPPED | OUTPATIENT
Start: 2019-05-16 | End: 2022-10-10

## 2019-05-16 RX ORDER — HYDRALAZINE HYDROCHLORIDE 20 MG/ML
5 INJECTION INTRAMUSCULAR; INTRAVENOUS EVERY 10 MIN PRN
Status: DISCONTINUED | OUTPATIENT
Start: 2019-05-16 | End: 2019-05-16 | Stop reason: HOSPADM

## 2019-05-16 RX ORDER — NICOTINE POLACRILEX 4 MG
15 LOZENGE BUCCAL PRN
Status: DISCONTINUED | OUTPATIENT
Start: 2019-05-16 | End: 2019-05-17 | Stop reason: HOSPADM

## 2019-05-16 RX ORDER — ONDANSETRON 2 MG/ML
4 INJECTION INTRAMUSCULAR; INTRAVENOUS
Status: DISCONTINUED | OUTPATIENT
Start: 2019-05-16 | End: 2019-05-16 | Stop reason: HOSPADM

## 2019-05-16 RX ORDER — OXYCODONE HYDROCHLORIDE AND ACETAMINOPHEN 5; 325 MG/1; MG/1
1 TABLET ORAL EVERY 4 HOURS PRN
Status: DISCONTINUED | OUTPATIENT
Start: 2019-05-16 | End: 2019-05-17 | Stop reason: HOSPADM

## 2019-05-16 RX ORDER — IBUPROFEN 600 MG/1
600 TABLET ORAL EVERY 6 HOURS PRN
Status: DISCONTINUED | OUTPATIENT
Start: 2019-05-17 | End: 2019-05-17 | Stop reason: HOSPADM

## 2019-05-16 RX ORDER — MORPHINE SULFATE 2 MG/ML
2 INJECTION, SOLUTION INTRAMUSCULAR; INTRAVENOUS EVERY 5 MIN PRN
Status: DISCONTINUED | OUTPATIENT
Start: 2019-05-16 | End: 2019-05-16 | Stop reason: HOSPADM

## 2019-05-16 RX ORDER — SODIUM CHLORIDE 0.9 % (FLUSH) 0.9 %
10 SYRINGE (ML) INJECTION PRN
Status: DISCONTINUED | OUTPATIENT
Start: 2019-05-16 | End: 2019-05-16 | Stop reason: HOSPADM

## 2019-05-16 RX ORDER — FENTANYL CITRATE 50 UG/ML
50 INJECTION, SOLUTION INTRAMUSCULAR; INTRAVENOUS EVERY 5 MIN PRN
Status: DISCONTINUED | OUTPATIENT
Start: 2019-05-16 | End: 2019-05-16 | Stop reason: HOSPADM

## 2019-05-16 RX ORDER — SENNA AND DOCUSATE SODIUM 50; 8.6 MG/1; MG/1
1 TABLET, FILM COATED ORAL DAILY
Qty: 30 TABLET | Refills: 0 | Status: SHIPPED | OUTPATIENT
Start: 2019-05-16

## 2019-05-16 RX ORDER — MIDAZOLAM HYDROCHLORIDE 1 MG/ML
INJECTION INTRAMUSCULAR; INTRAVENOUS PRN
Status: DISCONTINUED | OUTPATIENT
Start: 2019-05-16 | End: 2019-05-16 | Stop reason: SDUPTHER

## 2019-05-16 RX ORDER — MORPHINE SULFATE 10 MG/ML
INJECTION, SOLUTION INTRAMUSCULAR; INTRAVENOUS PRN
Status: DISCONTINUED | OUTPATIENT
Start: 2019-05-16 | End: 2019-05-16 | Stop reason: SDUPTHER

## 2019-05-16 RX ORDER — AMLODIPINE BESYLATE 5 MG/1
5 TABLET ORAL DAILY
Status: DISCONTINUED | OUTPATIENT
Start: 2019-05-17 | End: 2019-05-17 | Stop reason: HOSPADM

## 2019-05-16 RX ORDER — METOCLOPRAMIDE HYDROCHLORIDE 5 MG/ML
10 INJECTION INTRAMUSCULAR; INTRAVENOUS
Status: COMPLETED | OUTPATIENT
Start: 2019-05-16 | End: 2019-05-16

## 2019-05-16 RX ORDER — HYDROCODONE BITARTRATE AND ACETAMINOPHEN 5; 325 MG/1; MG/1
1 TABLET ORAL PRN
Status: DISCONTINUED | OUTPATIENT
Start: 2019-05-16 | End: 2019-05-16 | Stop reason: HOSPADM

## 2019-05-16 RX ORDER — HEPARIN SODIUM 5000 [USP'U]/ML
5000 INJECTION, SOLUTION INTRAVENOUS; SUBCUTANEOUS EVERY 12 HOURS
Status: COMPLETED | OUTPATIENT
Start: 2019-05-16 | End: 2019-05-17

## 2019-05-16 RX ORDER — DEXTROSE MONOHYDRATE 50 MG/ML
100 INJECTION, SOLUTION INTRAVENOUS PRN
Status: DISCONTINUED | OUTPATIENT
Start: 2019-05-16 | End: 2019-05-17 | Stop reason: HOSPADM

## 2019-05-16 RX ORDER — ROCURONIUM BROMIDE 10 MG/ML
INJECTION, SOLUTION INTRAVENOUS PRN
Status: DISCONTINUED | OUTPATIENT
Start: 2019-05-16 | End: 2019-05-16 | Stop reason: SDUPTHER

## 2019-05-16 RX ORDER — DIPHENHYDRAMINE HCL 25 MG
25 TABLET ORAL EVERY 6 HOURS PRN
Status: DISCONTINUED | OUTPATIENT
Start: 2019-05-16 | End: 2019-05-17 | Stop reason: HOSPADM

## 2019-05-16 RX ORDER — ACETAMINOPHEN 325 MG/1
650 TABLET ORAL EVERY 4 HOURS PRN
Status: DISCONTINUED | OUTPATIENT
Start: 2019-05-16 | End: 2019-05-17 | Stop reason: HOSPADM

## 2019-05-16 RX ORDER — BUPIVACAINE HYDROCHLORIDE AND EPINEPHRINE 2.5; 5 MG/ML; UG/ML
INJECTION, SOLUTION EPIDURAL; INFILTRATION; INTRACAUDAL; PERINEURAL PRN
Status: DISCONTINUED | OUTPATIENT
Start: 2019-05-16 | End: 2019-05-16 | Stop reason: ALTCHOICE

## 2019-05-16 RX ORDER — FUROSEMIDE 10 MG/ML
INJECTION INTRAMUSCULAR; INTRAVENOUS PRN
Status: DISCONTINUED | OUTPATIENT
Start: 2019-05-16 | End: 2019-05-16 | Stop reason: SDUPTHER

## 2019-05-16 RX ORDER — MEPERIDINE HYDROCHLORIDE 50 MG/ML
12.5 INJECTION INTRAMUSCULAR; INTRAVENOUS; SUBCUTANEOUS EVERY 5 MIN PRN
Status: DISCONTINUED | OUTPATIENT
Start: 2019-05-16 | End: 2019-05-16 | Stop reason: HOSPADM

## 2019-05-16 RX ORDER — HYDROCODONE BITARTRATE AND ACETAMINOPHEN 5; 325 MG/1; MG/1
2 TABLET ORAL PRN
Status: DISCONTINUED | OUTPATIENT
Start: 2019-05-16 | End: 2019-05-16 | Stop reason: HOSPADM

## 2019-05-16 RX ORDER — FENTANYL CITRATE 50 UG/ML
25 INJECTION, SOLUTION INTRAMUSCULAR; INTRAVENOUS EVERY 5 MIN PRN
Status: DISCONTINUED | OUTPATIENT
Start: 2019-05-16 | End: 2019-05-16 | Stop reason: HOSPADM

## 2019-05-16 RX ORDER — DIPHENHYDRAMINE HYDROCHLORIDE 50 MG/ML
12.5 INJECTION INTRAMUSCULAR; INTRAVENOUS
Status: DISCONTINUED | OUTPATIENT
Start: 2019-05-16 | End: 2019-05-16 | Stop reason: HOSPADM

## 2019-05-16 RX ORDER — SODIUM CHLORIDE 0.9 % (FLUSH) 0.9 %
10 SYRINGE (ML) INJECTION EVERY 12 HOURS SCHEDULED
Status: DISCONTINUED | OUTPATIENT
Start: 2019-05-16 | End: 2019-05-17 | Stop reason: HOSPADM

## 2019-05-16 RX ORDER — SCOLOPAMINE TRANSDERMAL SYSTEM 1 MG/1
1 PATCH, EXTENDED RELEASE TRANSDERMAL
Status: DISCONTINUED | OUTPATIENT
Start: 2019-05-16 | End: 2019-05-16

## 2019-05-16 RX ORDER — LISINOPRIL AND HYDROCHLOROTHIAZIDE 20; 12.5 MG/1; MG/1
1 TABLET ORAL DAILY
Status: DISCONTINUED | OUTPATIENT
Start: 2019-05-17 | End: 2019-05-16

## 2019-05-16 RX ORDER — PROPOFOL 10 MG/ML
INJECTION, EMULSION INTRAVENOUS PRN
Status: DISCONTINUED | OUTPATIENT
Start: 2019-05-16 | End: 2019-05-16 | Stop reason: SDUPTHER

## 2019-05-16 RX ORDER — KETOROLAC TROMETHAMINE 30 MG/ML
30 INJECTION, SOLUTION INTRAMUSCULAR; INTRAVENOUS EVERY 6 HOURS
Status: COMPLETED | OUTPATIENT
Start: 2019-05-16 | End: 2019-05-17

## 2019-05-16 RX ORDER — ONDANSETRON 4 MG/1
4 TABLET, ORALLY DISINTEGRATING ORAL EVERY 8 HOURS PRN
Qty: 10 TABLET | Refills: 0 | Status: SHIPPED | OUTPATIENT
Start: 2019-05-16 | End: 2022-10-10

## 2019-05-16 RX ORDER — SODIUM CHLORIDE 9 MG/ML
INJECTION, SOLUTION INTRAVENOUS CONTINUOUS
Status: DISCONTINUED | OUTPATIENT
Start: 2019-05-16 | End: 2019-05-17 | Stop reason: HOSPADM

## 2019-05-16 RX ORDER — GLYCOPYRROLATE 1 MG/5 ML
SYRINGE (ML) INTRAVENOUS PRN
Status: DISCONTINUED | OUTPATIENT
Start: 2019-05-16 | End: 2019-05-16 | Stop reason: SDUPTHER

## 2019-05-16 RX ORDER — ALBUTEROL SULFATE 90 UG/1
2 AEROSOL, METERED RESPIRATORY (INHALATION) EVERY 4 HOURS PRN
Status: DISCONTINUED | OUTPATIENT
Start: 2019-05-16 | End: 2019-05-17 | Stop reason: HOSPADM

## 2019-05-16 RX ORDER — SODIUM CHLORIDE 0.9 % (FLUSH) 0.9 %
10 SYRINGE (ML) INJECTION PRN
Status: DISCONTINUED | OUTPATIENT
Start: 2019-05-16 | End: 2019-05-17 | Stop reason: HOSPADM

## 2019-05-16 RX ORDER — SIMVASTATIN 20 MG
20 TABLET ORAL DAILY
Status: DISCONTINUED | OUTPATIENT
Start: 2019-05-17 | End: 2019-05-17 | Stop reason: HOSPADM

## 2019-05-16 RX ORDER — DEXAMETHASONE SODIUM PHOSPHATE 4 MG/ML
INJECTION, SOLUTION INTRA-ARTICULAR; INTRALESIONAL; INTRAMUSCULAR; INTRAVENOUS; SOFT TISSUE PRN
Status: DISCONTINUED | OUTPATIENT
Start: 2019-05-16 | End: 2019-05-16 | Stop reason: SDUPTHER

## 2019-05-16 RX ORDER — HYDROCHLOROTHIAZIDE 12.5 MG/1
12.5 CAPSULE, GELATIN COATED ORAL DAILY
Status: DISCONTINUED | OUTPATIENT
Start: 2019-05-16 | End: 2019-05-16

## 2019-05-16 RX ORDER — FENTANYL CITRATE 50 UG/ML
INJECTION, SOLUTION INTRAMUSCULAR; INTRAVENOUS PRN
Status: DISCONTINUED | OUTPATIENT
Start: 2019-05-16 | End: 2019-05-16 | Stop reason: SDUPTHER

## 2019-05-16 RX ORDER — OXYCODONE HYDROCHLORIDE AND ACETAMINOPHEN 5; 325 MG/1; MG/1
1 TABLET ORAL EVERY 6 HOURS PRN
Qty: 28 TABLET | Refills: 0 | Status: SHIPPED | OUTPATIENT
Start: 2019-05-16 | End: 2019-05-23

## 2019-05-16 RX ORDER — LIDOCAINE HYDROCHLORIDE 10 MG/ML
INJECTION, SOLUTION EPIDURAL; INFILTRATION; INTRACAUDAL; PERINEURAL PRN
Status: DISCONTINUED | OUTPATIENT
Start: 2019-05-16 | End: 2019-05-16 | Stop reason: SDUPTHER

## 2019-05-16 RX ORDER — LEVOTHYROXINE SODIUM 0.07 MG/1
75 TABLET ORAL DAILY
COMMUNITY

## 2019-05-16 RX ORDER — OXYCODONE HYDROCHLORIDE AND ACETAMINOPHEN 5; 325 MG/1; MG/1
2 TABLET ORAL EVERY 4 HOURS PRN
Status: DISCONTINUED | OUTPATIENT
Start: 2019-05-16 | End: 2019-05-17 | Stop reason: HOSPADM

## 2019-05-16 RX ORDER — HYDROCHLOROTHIAZIDE 12.5 MG/1
12.5 CAPSULE, GELATIN COATED ORAL DAILY
Status: DISCONTINUED | OUTPATIENT
Start: 2019-05-17 | End: 2019-05-17 | Stop reason: HOSPADM

## 2019-05-16 RX ORDER — ONDANSETRON 2 MG/ML
4 INJECTION INTRAMUSCULAR; INTRAVENOUS EVERY 6 HOURS PRN
Status: DISCONTINUED | OUTPATIENT
Start: 2019-05-16 | End: 2019-05-17 | Stop reason: HOSPADM

## 2019-05-16 RX ORDER — LEVOTHYROXINE SODIUM 0.07 MG/1
75 TABLET ORAL DAILY
Status: DISCONTINUED | OUTPATIENT
Start: 2019-05-17 | End: 2019-05-17 | Stop reason: HOSPADM

## 2019-05-16 RX ORDER — CEFAZOLIN SODIUM 1 G/3ML
INJECTION, POWDER, FOR SOLUTION INTRAMUSCULAR; INTRAVENOUS PRN
Status: DISCONTINUED | OUTPATIENT
Start: 2019-05-16 | End: 2019-05-16

## 2019-05-16 RX ORDER — SENNA AND DOCUSATE SODIUM 50; 8.6 MG/1; MG/1
1 TABLET, FILM COATED ORAL 2 TIMES DAILY
Status: DISCONTINUED | OUTPATIENT
Start: 2019-05-16 | End: 2019-05-17 | Stop reason: HOSPADM

## 2019-05-16 RX ORDER — LABETALOL 20 MG/4 ML (5 MG/ML) INTRAVENOUS SYRINGE
5 EVERY 10 MIN PRN
Status: DISCONTINUED | OUTPATIENT
Start: 2019-05-16 | End: 2019-05-16 | Stop reason: HOSPADM

## 2019-05-16 RX ORDER — LIDOCAINE HYDROCHLORIDE 10 MG/ML
1 INJECTION, SOLUTION EPIDURAL; INFILTRATION; INTRACAUDAL; PERINEURAL
Status: DISCONTINUED | OUTPATIENT
Start: 2019-05-16 | End: 2019-05-16 | Stop reason: HOSPADM

## 2019-05-16 RX ORDER — LISINOPRIL 20 MG/1
20 TABLET ORAL DAILY
Status: DISCONTINUED | OUTPATIENT
Start: 2019-05-17 | End: 2019-05-17 | Stop reason: HOSPADM

## 2019-05-16 RX ORDER — LISINOPRIL 20 MG/1
20 TABLET ORAL DAILY
Status: DISCONTINUED | OUTPATIENT
Start: 2019-05-16 | End: 2019-05-16

## 2019-05-16 RX ORDER — ONDANSETRON 2 MG/ML
INJECTION INTRAMUSCULAR; INTRAVENOUS PRN
Status: DISCONTINUED | OUTPATIENT
Start: 2019-05-16 | End: 2019-05-16 | Stop reason: SDUPTHER

## 2019-05-16 RX ADMIN — PROPOFOL 150 MG: 10 INJECTION, EMULSION INTRAVENOUS at 08:50

## 2019-05-16 RX ADMIN — PHENYLEPHRINE HYDROCHLORIDE 100 MCG: 10 INJECTION INTRAVENOUS at 09:08

## 2019-05-16 RX ADMIN — ROCURONIUM BROMIDE 50 MG: 10 INJECTION INTRAVENOUS at 08:50

## 2019-05-16 RX ADMIN — INSULIN LISPRO 1 UNITS: 100 INJECTION, SOLUTION INTRAVENOUS; SUBCUTANEOUS at 21:44

## 2019-05-16 RX ADMIN — SENNOSIDES AND DOCUSATE SODIUM 1 TABLET: 8.6; 5 TABLET ORAL at 21:37

## 2019-05-16 RX ADMIN — FENTANYL CITRATE 50 MCG: 50 INJECTION, SOLUTION INTRAMUSCULAR; INTRAVENOUS at 11:18

## 2019-05-16 RX ADMIN — CEFAZOLIN 1 G: 1 INJECTION, POWDER, FOR SOLUTION INTRAMUSCULAR; INTRAVENOUS at 19:26

## 2019-05-16 RX ADMIN — SODIUM CHLORIDE, POTASSIUM CHLORIDE, SODIUM LACTATE AND CALCIUM CHLORIDE: 600; 310; 30; 20 INJECTION, SOLUTION INTRAVENOUS at 07:44

## 2019-05-16 RX ADMIN — KETOROLAC TROMETHAMINE 30 MG: 30 INJECTION, SOLUTION INTRAMUSCULAR; INTRAVENOUS at 14:56

## 2019-05-16 RX ADMIN — FENTANYL CITRATE 100 MCG: 50 INJECTION, SOLUTION INTRAMUSCULAR; INTRAVENOUS at 08:50

## 2019-05-16 RX ADMIN — LIDOCAINE HYDROCHLORIDE 40 MG: 10 INJECTION, SOLUTION EPIDURAL; INFILTRATION; INTRACAUDAL; PERINEURAL at 08:50

## 2019-05-16 RX ADMIN — ROCURONIUM BROMIDE 20 MG: 10 INJECTION INTRAVENOUS at 09:31

## 2019-05-16 RX ADMIN — SODIUM CHLORIDE, POTASSIUM CHLORIDE, SODIUM LACTATE AND CALCIUM CHLORIDE: 600; 310; 30; 20 INJECTION, SOLUTION INTRAVENOUS at 11:53

## 2019-05-16 RX ADMIN — FAMOTIDINE 20 MG: 10 INJECTION, SOLUTION INTRAVENOUS at 21:37

## 2019-05-16 RX ADMIN — MIDAZOLAM 2 MG: 1 INJECTION INTRAMUSCULAR; INTRAVENOUS at 08:46

## 2019-05-16 RX ADMIN — FUROSEMIDE 10 MG: 10 INJECTION, SOLUTION INTRAVENOUS at 11:25

## 2019-05-16 RX ADMIN — SUGAMMADEX 200 MG: 100 INJECTION, SOLUTION INTRAVENOUS at 12:01

## 2019-05-16 RX ADMIN — HEPARIN SODIUM 5000 UNITS: 5000 INJECTION INTRAVENOUS; SUBCUTANEOUS at 21:37

## 2019-05-16 RX ADMIN — METHYLENE BLUE 5 ML: 5 INJECTION INTRAVENOUS at 11:35

## 2019-05-16 RX ADMIN — DEXAMETHASONE SODIUM PHOSPHATE 10 MG: 4 INJECTION, SOLUTION INTRA-ARTICULAR; INTRALESIONAL; INTRAMUSCULAR; INTRAVENOUS; SOFT TISSUE at 09:00

## 2019-05-16 RX ADMIN — Medication 0.2 MG: at 09:08

## 2019-05-16 RX ADMIN — PHENYLEPHRINE HYDROCHLORIDE 100 MCG: 10 INJECTION INTRAVENOUS at 09:15

## 2019-05-16 RX ADMIN — OXYCODONE HYDROCHLORIDE AND ACETAMINOPHEN 1 TABLET: 5; 325 TABLET ORAL at 21:44

## 2019-05-16 RX ADMIN — KETOROLAC TROMETHAMINE 30 MG: 30 INJECTION, SOLUTION INTRAMUSCULAR; INTRAVENOUS at 21:37

## 2019-05-16 RX ADMIN — Medication 2 G: at 11:49

## 2019-05-16 RX ADMIN — PROPOFOL 50 MG: 10 INJECTION, EMULSION INTRAVENOUS at 09:22

## 2019-05-16 RX ADMIN — ROCURONIUM BROMIDE 10 MG: 10 INJECTION INTRAVENOUS at 11:03

## 2019-05-16 RX ADMIN — FENTANYL CITRATE 50 MCG: 50 INJECTION, SOLUTION INTRAMUSCULAR; INTRAVENOUS at 09:29

## 2019-05-16 RX ADMIN — Medication 1 LOZENGE: at 21:46

## 2019-05-16 RX ADMIN — MORPHINE SULFATE 10 MG: 10 INJECTION INTRAVENOUS at 11:38

## 2019-05-16 RX ADMIN — METOCLOPRAMIDE 10 MG: 5 INJECTION, SOLUTION INTRAMUSCULAR; INTRAVENOUS at 12:35

## 2019-05-16 RX ADMIN — ROCURONIUM BROMIDE 10 MG: 10 INJECTION INTRAVENOUS at 10:45

## 2019-05-16 RX ADMIN — ONDANSETRON 4 MG: 2 INJECTION INTRAMUSCULAR; INTRAVENOUS at 11:56

## 2019-05-16 RX ADMIN — ROCURONIUM BROMIDE 20 MG: 10 INJECTION INTRAVENOUS at 10:11

## 2019-05-16 RX ADMIN — SODIUM CHLORIDE: 9 INJECTION, SOLUTION INTRAVENOUS at 14:56

## 2019-05-16 RX ADMIN — Medication 2 G: at 09:00

## 2019-05-16 RX ADMIN — FENTANYL CITRATE 50 MCG: 50 INJECTION, SOLUTION INTRAMUSCULAR; INTRAVENOUS at 10:49

## 2019-05-16 ASSESSMENT — PULMONARY FUNCTION TESTS
PIF_VALUE: 38
PIF_VALUE: 36
PIF_VALUE: 36
PIF_VALUE: 28
PIF_VALUE: 36
PIF_VALUE: 27
PIF_VALUE: 34
PIF_VALUE: 36
PIF_VALUE: 37
PIF_VALUE: 37
PIF_VALUE: 30
PIF_VALUE: 37
PIF_VALUE: 26
PIF_VALUE: 37
PIF_VALUE: 25
PIF_VALUE: 37
PIF_VALUE: 35
PIF_VALUE: 35
PIF_VALUE: 37
PIF_VALUE: 36
PIF_VALUE: 22
PIF_VALUE: 20
PIF_VALUE: 17
PIF_VALUE: 37
PIF_VALUE: 22
PIF_VALUE: 23
PIF_VALUE: 28
PIF_VALUE: 29
PIF_VALUE: 29
PIF_VALUE: 20
PIF_VALUE: 34
PIF_VALUE: 37
PIF_VALUE: 20
PIF_VALUE: 36
PIF_VALUE: 38
PIF_VALUE: 37
PIF_VALUE: 35
PIF_VALUE: 37
PIF_VALUE: 36
PIF_VALUE: 37
PIF_VALUE: 36
PIF_VALUE: 20
PIF_VALUE: 36
PIF_VALUE: 34
PIF_VALUE: 34
PIF_VALUE: 20
PIF_VALUE: 37
PIF_VALUE: 28
PIF_VALUE: 21
PIF_VALUE: 34
PIF_VALUE: 37
PIF_VALUE: 36
PIF_VALUE: 0
PIF_VALUE: 29
PIF_VALUE: 38
PIF_VALUE: 17
PIF_VALUE: 23
PIF_VALUE: 36
PIF_VALUE: 37
PIF_VALUE: 35
PIF_VALUE: 36
PIF_VALUE: 36
PIF_VALUE: 35
PIF_VALUE: 34
PIF_VALUE: 37
PIF_VALUE: 35
PIF_VALUE: 38
PIF_VALUE: 29
PIF_VALUE: 34
PIF_VALUE: 28
PIF_VALUE: 27
PIF_VALUE: 34
PIF_VALUE: 37
PIF_VALUE: 37
PIF_VALUE: 20
PIF_VALUE: 36
PIF_VALUE: 19
PIF_VALUE: 37
PIF_VALUE: 37
PIF_VALUE: 35
PIF_VALUE: 35
PIF_VALUE: 28
PIF_VALUE: 36
PIF_VALUE: 37
PIF_VALUE: 35
PIF_VALUE: 36
PIF_VALUE: 36
PIF_VALUE: 20
PIF_VALUE: 40
PIF_VALUE: 37
PIF_VALUE: 35
PIF_VALUE: 35
PIF_VALUE: 28
PIF_VALUE: 34
PIF_VALUE: 37
PIF_VALUE: 36
PIF_VALUE: 20
PIF_VALUE: 20
PIF_VALUE: 38
PIF_VALUE: 34
PIF_VALUE: 37
PIF_VALUE: 24
PIF_VALUE: 36
PIF_VALUE: 3
PIF_VALUE: 35
PIF_VALUE: 37
PIF_VALUE: 38
PIF_VALUE: 36
PIF_VALUE: 36
PIF_VALUE: 35
PIF_VALUE: 37
PIF_VALUE: 21
PIF_VALUE: 19
PIF_VALUE: 1
PIF_VALUE: 36
PIF_VALUE: 20
PIF_VALUE: 35
PIF_VALUE: 21
PIF_VALUE: 36
PIF_VALUE: 27
PIF_VALUE: 37
PIF_VALUE: 29
PIF_VALUE: 35
PIF_VALUE: 1
PIF_VALUE: 20
PIF_VALUE: 3
PIF_VALUE: 30
PIF_VALUE: 33
PIF_VALUE: 36
PIF_VALUE: 20
PIF_VALUE: 36
PIF_VALUE: 38
PIF_VALUE: 1
PIF_VALUE: 29
PIF_VALUE: 21
PIF_VALUE: 0
PIF_VALUE: 35
PIF_VALUE: 21
PIF_VALUE: 38
PIF_VALUE: 2
PIF_VALUE: 28
PIF_VALUE: 20
PIF_VALUE: 28
PIF_VALUE: 37
PIF_VALUE: 29
PIF_VALUE: 20
PIF_VALUE: 28
PIF_VALUE: 37
PIF_VALUE: 36
PIF_VALUE: 36
PIF_VALUE: 37
PIF_VALUE: 35
PIF_VALUE: 20
PIF_VALUE: 36
PIF_VALUE: 20
PIF_VALUE: 23
PIF_VALUE: 19
PIF_VALUE: 36
PIF_VALUE: 21
PIF_VALUE: 37
PIF_VALUE: 37
PIF_VALUE: 39
PIF_VALUE: 36
PIF_VALUE: 35
PIF_VALUE: 21
PIF_VALUE: 36
PIF_VALUE: 19
PIF_VALUE: 34
PIF_VALUE: 37
PIF_VALUE: 21
PIF_VALUE: 22
PIF_VALUE: 34
PIF_VALUE: 21
PIF_VALUE: 37
PIF_VALUE: 30
PIF_VALUE: 28
PIF_VALUE: 36
PIF_VALUE: 37
PIF_VALUE: 28
PIF_VALUE: 38
PIF_VALUE: 36
PIF_VALUE: 37
PIF_VALUE: 36
PIF_VALUE: 35
PIF_VALUE: 37
PIF_VALUE: 19
PIF_VALUE: 36
PIF_VALUE: 34
PIF_VALUE: 37
PIF_VALUE: 28
PIF_VALUE: 35
PIF_VALUE: 37
PIF_VALUE: 1
PIF_VALUE: 20
PIF_VALUE: 34
PIF_VALUE: 37
PIF_VALUE: 35
PIF_VALUE: 35
PIF_VALUE: 37

## 2019-05-16 ASSESSMENT — PAIN SCALES - GENERAL
PAINLEVEL_OUTOF10: 0
PAINLEVEL_OUTOF10: 0
PAINLEVEL_OUTOF10: 7
PAINLEVEL_OUTOF10: 7
PAINLEVEL_OUTOF10: 0
PAINLEVEL_OUTOF10: 0
PAINLEVEL_OUTOF10: 7
PAINLEVEL_OUTOF10: 0
PAINLEVEL_OUTOF10: 0

## 2019-05-16 ASSESSMENT — PAIN DESCRIPTION - LOCATION: LOCATION: ABDOMEN

## 2019-05-16 ASSESSMENT — PAIN DESCRIPTION - PAIN TYPE: TYPE: SURGICAL PAIN

## 2019-05-16 ASSESSMENT — PAIN - FUNCTIONAL ASSESSMENT: PAIN_FUNCTIONAL_ASSESSMENT: 0-10

## 2019-05-16 NOTE — ANESTHESIA PRE PROCEDURE
Current medications:    Current Facility-Administered Medications   Medication Dose Route Frequency Provider Last Rate Last Dose    lactated ringers infusion   Intravenous Continuous Everardo Alberts  mL/hr at 05/16/19 0744      lidocaine PF 1 % injection 1 mL  1 mL Intradermal Once PRN Everardo Alberts MD        scopolamine (TRANSDERM-SCOP) transdermal patch 1 patch  1 patch Transdermal Q72H Everardo Alberts MD   1 patch at 05/16/19 0746    sodium chloride flush 0.9 % injection 10 mL  10 mL Intravenous 2 times per day Everardo Alberts MD        sodium chloride flush 0.9 % injection 10 mL  10 mL Intravenous PRN Everardo Alberts MD        ceFAZolin (ANCEF) 2 g in dextrose 5 % 50 mL IVPB  2 g Intravenous Once Francois Medel DO           Allergies: Allergies   Allergen Reactions    Bee Venom     Codeine     Seasonal        Problem List:    Patient Active Problem List   Diagnosis Code    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    History of cervical dysplasia J63.972    Lichen sclerosus of female genitalia N90.4    [facility] 12/18/18 Z98.890    Cervical dysplasia N87.9       Past Medical History:        Diagnosis Date    Abnormal Pap smear of cervix     Anemia     Small's esophagus     Chronic back pain     Chronic sinusitis     Diabetes mellitus ([address]     Environmental allergies     GERD (gastroesophageal reflux disease)     Hyperlipidemia     Hypertension     OA (osteoarthritis of spine)     generalized.  PONV (postoperative nausea and vomiting)     Wears glasses     Wears glasses        Past Surgical History:        Procedure Laterality Date    APPENDECTOMY      CHOLECYSTECTOMY      COLONOSCOPY      COLPOSCOPY  06/15/2017    dr medel- has had previous colp before    ENDOSCOPY, COLON, DIAGNOSTIC      EGD    ENDOSCOPY, COLON, DIAGNOSTIC  09-05-14    barretts esoph, milliary lesions duodenal bulb, hiatal hernia, healed antral ulcer.     FOOT SURGERY Right     foreign body removed    JOINT REPLACEMENT Bilateral     knees    LEEP N/A 2018    LEEP performed by London Langford DO at [address] [facility] FLX DX W/COLLCOSME [address] PFRMD N/A 2018    COLONOSCOPY performed by Alberto Taylor MD at [address] [facility] ENDOSCOPY  9/11/15    BARRETTS       Social History:    Social History     Tobacco Use    Smoking status: Former Smoker     Last attempt to quit: 2011     Years since quittin.3    Smokeless tobacco: Never Used   Substance Use Topics    Alcohol use: No                                Counseling given: Not Answered      Vital Signs (Current):   Vitals:    19 0725   BP: 130/68   Pulse: 87   Resp: 18   Temp: 98.1 °F (36.7 °C)   TempSrc: Oral   SpO2: 99%   Weight: 243 lb (110.2 kg)   Height: 5' 2.5\" (1.588 m)                                              BP Readings from Last 3 Encounters:   19 130/68   19 (!) 117/58   19 120/82       NPO Status: Time of last liquid consumption:                         Time of last solid consumption:                         Date of last liquid consumption: 05/15/19                        Date of last solid food consumption: 05/15/19    BMI:   Wt Readings from Last 3 Encounters:   19 243 lb (110.2 kg)   19 243 lb (110.2 kg)   19 240 lb (108.9 kg)     Body mass index is 43.74 kg/m².     CBC:   Lab Results   Component Value Date    WBC 6.7 2019    RBC 3.43 2019    HGB 10.1 2019    HCT 30.4 2019    MCV 88.6 2019    RDW 14.3 2019     2019       CMP:   Lab Results   Component Value Date     2019    K 4.7 2019    CL 93 2019    CO2 24 2019    BUN 17 2019    CREATININE 0.61 2019    GFRAA >60 2019    LABGLOM >60 2019    GLUCOSE 102 2019    GLUCOSE 111 10/07/2016    PROT 7.5 2019    CALCIUM 9.1 2019    BILITOT 0.28 2019    ALKPHOS 72 2019 AST 19 05/02/2019    ALT 15 05/02/2019       POC Tests: No results for input(s): POCGLU, POCNA, POCK, POCCL, POCBUN, POCHEMO, POCHCT in the last 72 hours. Coags:   Lab Results   Component Value Date    PROTIME 12.4 05/02/2019    INR 0.9 05/02/2019    APTT 37.7 05/02/2019       HCG (If Applicable): No results found for: PREGTESTUR, PREGSERUM, HCG, HCGQUANT     ABGs: No results found for: PHART, PO2ART, MJX0WVD, XXN1ZQU, BEART, N0EMJKHU     Type & Screen (If Applicable):  No results found for: LABABO, [address]    Anesthesia Evaluation  Patient summary reviewed and Nursing notes reviewed   history of anesthetic complications: PONV. Airway: Mallampati: II  TM distance: >3 FB   Neck ROM: full  Mouth opening: > = 3 FB Dental: normal exam         Pulmonary:Negative Pulmonary ROS and normal exam                               Cardiovascular:    (+) hypertension:,       ECG reviewed  Rhythm: regular  Rate: normal                    Neuro/Psych:   Negative Neuro/Psych ROS              GI/Hepatic/Renal:   (+) GERD:,           Endo/Other:    (+) Diabetes, : arthritis: OA and no interval change. , . Abdominal:           Vascular: negative vascular ROS. Anesthesia Plan      general     ASA 2       Induction: intravenous. MIPS: Postoperative opioids intended and Prophylactic antiemetics administered. Anesthetic plan and risks discussed with patient. Plan discussed with CRNA.                   Jefm Mohs, MD   5/16/2019

## 2019-05-16 NOTE — OP NOTE
Operative Note  Department of Obstetrics and Gynecology  [facility]       Patient: Lazara Rodriguez   : 1950  MRN: [id number]       Acct: [de-identified]   PCP: Yohana Torres MD  Date of Procedure: 19    Pre-operative Diagnosis: 78 y.o. female      HPV in female    Recurrent Dysplasia    Lichen sclerosus of female genitalia    LEEP w/Top [facility] 18    Hypertension    Hypothyroidism    Postoperative state    Urinary incontinence         Post-operative Diagnosis: same  Cystocele grade 2  Redundant bladder tissue  Cervical stenosis    Procedure: Robot assisted laparoscopic hysterectomy with bilateral salpingo-oopherectomy via vaginal extraction  Cystoscopy    Surgeon: Adela Yanez DO    Assistants: Leslie Corona DO PGY 3    Anesthesia: general with ET tube    Indications: She has a history of recurrent dysplasia despite treatment with LEEP. She wishes definitve treatment at this time. Counseled in detail on R/B/A of procedure and need for further cytology    Procedure Details: The patient was seen in the pre-op room. The risks, benefits, complications, treatment options, and expected outcomes were discussed with the patient. The patient concurred with the proposed plan, giving informed consent. The patient was taken to the Operating Room and identified as Lazara Rodriguez and the procedure was verified. A Time Out was held and the above information confirmed.      After administration of general anesthesia, the patient was placed in the dorsolithotomy position with Rizwan stirrups and examination under general anesthesia was performed with findings as noted below. The patient was prepped and draped in the usual sterile fashion. A weighted speculum was placed into the vagina to visualize the cervix. The cervix was grasped with a chris tenaculum. Very difficult visualization secondary to previous LEEPs, cervical stenosis, and redundant vaginal tissue.   Uterus sounded to uterus. The left uterine artery was skeletonized and the blood supply was identified on the right, clamped, cauterized and transected, and the bladder flap was created on the left as well.   Excellent hemostasis was maintained.        The bladder was noted to be adherent to anterior uterus and it was backfilled to note its dimensions. Course of ureter was reevaluated throughout this step. Bladder flap was made and the cervical cuff was cleared of all blood vessels without difficulty and the uterus was amputated via colpotomy with monopolar scissors and removed and left in the vagina.  Excellent hemostasis was achieved.       The right IP ligament was skeletonized with note of ureteral course and it was coagulated and transected removing the ovary and tube. The same was done on the left side.        The urine was clear throughout procedure with amount adequate. The vagina cuff was sewed with 4 figure-of-eight sutures 0-vicryl suture.   The pelvis was irrigated with warm normal saline and excellent hemostasis was acheived.   10mg Lasix was given to aid in cystoscopy. Methylene Blue was given to aid in visualization      Rigid cystoscope with 30 degree angle/22 Faroese sheath was used with normal saline as distending medium. The right and left ureter were seen peristalsing and strong jet/efflux was seen strong from both sides. The bladder was evaluated and noted to be intact without any trauma or insult from surgery.        At this time the ports were removed under direct visualization. The pelvis was reexamined with patient in steep trendelenburg and hemostasis was again noted.  Air seal used during the case aided in removal of all pneumoperitoneum and Incisions closed with 4-0 monocryl and skin glue used to cover these incisions.      All sponge, lap, and needle counts were correct x2. She went to recovery room in stable condition. She received preop antibiotics.  SCDs throughout procedure    Findings: normal sized mid position uterus, 6 sized midposition uterus   Estimated Blood Loss: 20ml  Drains:  Romeo catheter to gravity  Total IV Fluids: 1000ml  Urine output: 400ml clear urine during the case,  Slight blood tinged at end of case  Specimens:  Uterus, cervix, bilateral fallopian tubes, bilateral ovaries  Instrument and Sponge Count: x2 Correct  Complications: none  Condition: stable, transfer to post anesthesia recovery    Redd Fisher DO  Ob/Gyn   5/16/2019, 12:55 PM

## 2019-05-16 NOTE — PROGRESS NOTES
Patient arrives to room 2072 from OR. Patient alert, oriented. Oriented to room and surroundings. Assessment completed and as charted. No distress noted. Family present at bedside.       Electronically signed by Josseline River RN on 5/16/2019 at 6:31 PM

## 2019-05-16 NOTE — PLAN OF CARE
Problem: SAFETY  Goal: Free from accidental physical injury  Outcome: Ongoing  Goal: Free from intentional harm  Outcome: Ongoing     Problem: DAILY CARE  Goal: Daily care needs are met  Outcome: Ongoing     Problem: PAIN  Goal: Patient's pain/discomfort is manageable  Outcome: Ongoing     No injury noted. Patient uses call light appropriately. Patient denies pain, or need for prn at this time.       Electronically signed by Marichuy Christianson RN on 5/16/2019 at 6:41 PM

## 2019-05-16 NOTE — DISCHARGE SUMMARY
Gyn Discharge Summary    Patient Name: Annelise Howard  Patient : 1950  Primary Care Physician: Vern Lee MD  Admit Date: 2019    Principal Diagnosis: Recurrent Dysplasia    Other Diagnosis:   Postoperative state [Z98.890]  Patient Active Problem List   Diagnosis    HPV in female    Recurrent Dysplasia    Lichen sclerosus of female genitalia    LEEP w/Top Hat 18    Cervical dysplasia    Hypertension    Hypothyroidism    RALH BSO w/ Cystoscopy & Retrograde Bladder Instillation 19    Postoperative state    Hanover-Walker grade 2 cystocele    Urinary incontinence       Infection: No  Hospital Acquired: No    Surgical Operations & Procedures: RALH BSO w/ Cystoscopy & Retrograde Bladder Instillation    Consultations: Anesthesia    Pertinent Findings & Procedures:   Annelise Howard is a 78 y.o. female , admitted for recurrent dysplasia; received ancef pre-operatively. She underwent RALH BSO w/ Cystoscopy & Retrograde Bladder Instillation on 19. Post-op course normal, discharged home on POD#1. Follow up in 2 weeks. Discharge instructions reviewed and questions answered. Course of patient: Hgb went from 10.1 on  to 7.7 on POD#1. Repeat later on POD#1 was 8.0. Her Cr on POD#1 was 1.05 and repeated later on POD#1 and found to be 0.85. She will get a repeat BMP on .     Discharge to: Home    Readmission planned: No    Recommendations on Discharge:     Medications:     Medication List      START taking these medications    ferrous sulfate 325 (65 Fe) MG EC tablet  Commonly known as:  FE TABS  Take 1 tablet by mouth 2 times daily     ibuprofen 600 MG tablet  Commonly known as:  ADVIL;MOTRIN  Take 1 tablet by mouth every 8 hours as needed for Pain     ondansetron 4 MG disintegrating tablet  Commonly known as:  ZOFRAN-ODT  Take 1 tablet by mouth every 8 hours as needed for Nausea or Vomiting     oxyCODONE-acetaminophen 5-325 MG per tablet  Commonly known as: PERCOCET  Take 1 tablet by mouth every 6 hours as needed for Pain for up to 7 days. sennosides-docusate sodium 8.6-50 MG tablet  Commonly known as:  SENOKOT-S  Take 1 tablet by mouth daily        CONTINUE taking these medications    ACCU-CHEK ANNABEL PLUS strip  Generic drug:  blood glucose test strips     ACCU-CHEK MULTICLIX LANCETS Misc     amLODIPine 5 MG tablet  Commonly known as:  NORVASC     aspirin 81 MG tablet     CALCIUM-VITAMIN D PO     clobetasol 0.05 % cream  Commonly known as:  TEMOVATE  APPLY EXTERNALLY TO THE AFFECTED AREA TWICE DAILY     EPIPEN 2-ELISEO 0.3 MG/0.3ML Soaj injection  Generic drug:  EPINEPHrine     HYDROcodone-acetaminophen 5-325 MG per tablet  Commonly known as:  NORCO     levothyroxine 75 MCG tablet  Commonly known as:  SYNTHROID     lisinopril-hydrochlorothiazide 20-12.5 MG per tablet  Commonly known as:  PRINZIDE;ZESTORETIC     meloxicam 15 MG tablet  Commonly known as:  MOBIC     metFORMIN 1000 MG tablet  Commonly known as:  GLUCOPHAGE     NEXIUM 40 MG delayed release capsule  Generic drug:  esomeprazole     PROAIR  (90 Base) MCG/ACT inhaler  Generic drug:  albuterol sulfate HFA     simvastatin 40 MG tablet  Commonly known as:  ZOCOR     therapeutic multivitamin-minerals tablet           Where to Get Your Medications      You can get these medications from any pharmacy    Bring a paper prescription for each of these medications  · ferrous sulfate 325 (65 Fe) MG EC tablet  · ibuprofen 600 MG tablet  · ondansetron 4 MG disintegrating tablet  · oxyCODONE-acetaminophen 5-325 MG per tablet  · sennosides-docusate sodium 8.6-50 MG tablet           Activity: pelvic rest x 6 weeks, no driving on narcotics, no lifting greater than 15 lbs  Diet: diabetic diet  Follow up: 2 weeks     Condition on discharge: good and stable   Discharge Date: 5/17/19    Comments:  Home care, Follow-up care, restrictions reviewed.     Jacy Kline DO  Ob/Gyn Resident  [facility]  5/17/2019, 2:26 PM

## 2019-05-16 NOTE — H&P
OB/GYN Pre-Op H&P  Lori Rodríguez    Patient Name: Ruthann De La Torre     Patient : 1950  Room/Bed: [facility] OR Pool/NONE  Admission Date/Time: 2019  6:44 AM  Primary Care Physician: Luiz Uriarte MD  MRN: [id number]    Date: 2019  Time: 8:19 AM    The patient was seen in pre-op holding. She is here for [facility] BSO with cystoscopy. She has a history of recurrent dysplasia despite treatment with LEEP. She wishes definitve treatment at this time. The procedure risks and complications were reviewed. The labs, Consent, and H&P were reviewed and updated. The patient was counseled on the possibility of  the need of a second surgery. The patient voiced understanding and had all of her questions answered. The possibility of incomplete removal of abnormal tissue was discussed. OBSTETRICAL HISTORY:   OB History    Para Term  AB Living   3 3 3 0 0 3   SAB TAB Ectopic Molar Multiple Live Births   0 0 0 0 0 3      # Outcome Date GA Lbr Sabino/2nd Weight Sex Delivery Anes PTL Lv   3 Term      Vag-Spont  N SHYAM   2 Term      Vag-Spont  N SHYAM   1 Term      Vag-Spont  N SHYAM       PAST MEDICAL HISTORY:   has a past medical history of Abnormal Pap smear of cervix, Anemia, Small's esophagus, Chronic back pain, Chronic sinusitis, Diabetes mellitus ([address] Environmental allergies, GERD (gastroesophageal reflux disease), Hyperlipidemia, Hypertension, OA (osteoarthritis of spine), PONV (postoperative nausea and vomiting), Wears glasses, and Wears glasses. PAST SURGICAL HISTORY:   has a past surgical history that includes Appendectomy; Cholecystectomy; joint replacement (Bilateral); Foot surgery (Right); Tubal ligation; Endoscopy, colon, diagnostic; Endoscopy, colon, diagnostic (14); Upper gastrointestinal endoscopy (9/11/15); Colposcopy (06/15/2017); pr colonoscopy flx dx w/collj spec when pfrmd (N/A, 2018); Colonoscopy; and LEEP (N/A, 2018).     ALLERGIES:  Allergies as of meperidine (DEMEROL) injection 12.5 mg  12.5 mg Intravenous Q5 Min PRN Yung Martin MD           FAMILY HISTORY:  family history includes Cancer in her mother. SOCIAL HISTORY:   reports that she quit smoking about 8 years ago. She has never used smokeless tobacco. She reports that she does not drink alcohol or use drugs. VITALS:  Vitals:    05/16/19 0725   BP: 130/68   Pulse: 87   Resp: 18   Temp: 98.1 °F (36.7 °C)   TempSrc: Oral   SpO2: 99%   Weight: 243 lb (110.2 kg)   Height: 5' 2.5\" (1.588 m)                                                                                                                               PHYSICAL EXAM:     General appearance:  no apparent distress, alert and cooperative  Lungs:  No increased work of breathing, good air exchange, clear to auscultation bilaterally, no crackles or wheezing  Heart:  regular rate and rhythm and no murmur        LAB RESULTS:  Admission on 05/16/2019   Component Date Value Ref Range Status    Protime 05/16/2019 12.9  11.8 - 14.6 sec Final    INR 05/16/2019 1.0   Final    Comment:       Non-therapeutic Range:     INR = 0.9-1.2  Therapeutic Range:    Moderate Anticoagulant Intensity:     INR = 2.0-3.0   High Anticoagulant Intensity:     INR = 2.5-3.5            PTT 05/16/2019 36.6* 24.0 - 36.0 sec Final    Comment:       IV Heparin Therapy Range:      62.0-94.0           Hospital Outpatient Visit on 05/02/2019   Component Date Value Ref Range Status    WBC 05/02/2019 6.7  3.5 - 11.0 k/uL Final    RBC 05/02/2019 3.43* 4.0 - 5.2 m/uL Final    Hemoglobin 05/02/2019 10.1* 12.0 - 16.0 g/dL Final    Hematocrit 05/02/2019 30.4* 36 - 46 % Final    MCV 05/02/2019 88.6  80 - 100 fL Final    MCH 05/02/2019 29.4  26 - 34 pg Final    MCHC 05/02/2019 33.1  31 - 37 g/dL Final    RDW 05/02/2019 14.3  11.5 - 14.9 % Final    Platelets 06/59/8030 434  150 - 450 k/uL Final    MPV 05/02/2019 7.2  6.0 - 12.0 fL Final    NRBC Automated 05/02/2019 NOT REPORTED per 100 WBC Final    Differential Type 05/02/2019 NOT REPORTED   Final    Seg Neutrophils 05/02/2019 70* 36 - 66 % Final    Lymphocytes 05/02/2019 19* 24 - 44 % Final    Monocytes 05/02/2019 7  1 - 7 % Final    Eosinophils % 05/02/2019 4  0 - 4 % Final    Basophils 05/02/2019 0  0 - 2 % Final    Immature Granulocytes 05/02/2019 NOT REPORTED  0 % Final    Segs Absolute 05/02/2019 4.60  1.3 - 9.1 k/uL Final    Absolute Lymph # 05/02/2019 1.30  1.0 - 4.8 k/uL Final    Absolute Mono # 05/02/2019 0.40  0.1 - 1.3 k/uL Final    Absolute Eos # 05/02/2019 0.20  0.0 - 0.4 k/uL Final    Basophils # 05/02/2019 0.00  0.0 - 0.2 k/uL Final    Absolute Immature Granulocyte 05/02/2019 NOT REPORTED  0.00 - 0.30 k/uL Final    WBC Morphology 05/02/2019 NOT REPORTED   Final    RBC Morphology 05/02/2019 NOT REPORTED   Final    Platelet Estimate 63/48/5613 NOT REPORTED   Final    Protime 05/02/2019 12.4  11.8 - 14.6 sec Final    INR 05/02/2019 0.9   Final    Comment:       Non-therapeutic Range:     INR = 0.9-1.2  Therapeutic Range:    Moderate Anticoagulant Intensity:     INR = 2.0-3.0   High Anticoagulant Intensity:     INR = 2.5-3.5            PTT 05/02/2019 37.7* 24.0 - 36.0 sec Final    Comment:       IV Heparin Therapy Range:      62.0-94.0            Color, UA 05/02/2019 YELLOW  YELLOW Final    Turbidity UA 05/02/2019 CLEAR  CLEAR Final    Glucose, Ur 05/02/2019 NEGATIVE  NEGATIVE Final    Bilirubin Urine 05/02/2019 NEGATIVE  NEGATIVE Final    Ketones, Urine 05/02/2019 NEGATIVE  NEGATIVE Final    Specific Gravity, UA 05/02/2019 1.005  1.000 - 1.030 Final    Urine Hgb 05/02/2019 NEGATIVE  NEGATIVE Final    pH, UA 05/02/2019 7.5  5.0 - 8.0 Final    Protein, UA 05/02/2019 NEGATIVE  NEGATIVE Final    Urobilinogen, Urine 05/02/2019 Normal  Normal Final    Nitrite, Urine 05/02/2019 NEGATIVE  NEGATIVE Final    Leukocyte Esterase, Urine 05/02/2019 NEGATIVE  NEGATIVE Final    Urinalysis Comments 05/02/2019 Microscopic exam not performed based on chemical results unless requested in original order. Final    Expiration Date 05/02/2019 05/19/2019   Final    Arm Band Number 05/02/2019 [id number]   Final    ABO/Rh 05/02/2019 A POSITIVE   Final    Antibody Screen 05/02/2019 NEGATIVE   Final    Blood Bank Comment 05/02/2019    Final                    Value:PT'S ABORH CONFIRMED A POS:404  Results Verified      Hemoglobin A1C 05/02/2019 5.7  4.0 - 6.0 % Final    Estimated Avg Glucose 05/02/2019 117  mg/dL Final    Comment: The ADA and AACC recommend providing the estimated average glucose result to permit better   patient understanding of their HBA1c result.       TSH 05/02/2019 7.12* 0.30 - 5.00 mIU/L Final    Glucose 05/02/2019 102* 70 - 99 mg/dL Final    BUN 05/02/2019 17  8 - 23 mg/dL Final    CREATININE 05/02/2019 0.61  0.50 - 0.90 mg/dL Final    Bun/Cre Ratio 05/02/2019 NOT REPORTED  9 - 20 Final    Calcium 05/02/2019 9.1  8.6 - 10.4 mg/dL Final    Sodium 05/02/2019 130* 135 - 144 mmol/L Final    Potassium 05/02/2019 4.7  3.7 - 5.3 mmol/L Final    Chloride 05/02/2019 93* 98 - 107 mmol/L Final    CO2 05/02/2019 24  20 - 31 mmol/L Final    Anion Gap 05/02/2019 13  9 - 17 mmol/L Final    Alkaline Phosphatase 05/02/2019 72  35 - 104 U/L Final    ALT 05/02/2019 15  5 - 33 U/L Final    AST 05/02/2019 19  <32 U/L Final    Total Bilirubin 05/02/2019 0.28* 0.3 - 1.2 mg/dL Final    Total Protein 05/02/2019 7.5  6.4 - 8.3 g/dL Final    Alb 05/02/2019 4.3  3.5 - 5.2 g/dL Final    Albumin/Globulin Ratio 05/02/2019 NOT REPORTED  1.0 - 2.5 Final    GFR Non- 05/02/2019 >60  >60 mL/min Final    GFR  05/02/2019 >60  >60 mL/min Final    GFR Comment 05/02/2019        Final    Comment: Average GFR for 79or more years old:   76 mL/min/1.73sq m  Chronic Kidney Disease:   <60 mL/min/1.73sq m  Kidney failure:   <15 mL/min/1.73sq m              eGFR calculated using average adult body mass. Additional eGFR calculator available at:        [employer]            GFR Staging 05/02/2019 NOT REPORTED   Final    Thyroxine, Free 05/02/2019 1.11  0.93 - 1.70 ng/dL Final       DIAGNOSTICS:  Us Non Ob Transvaginal    Result Date: 5/2/2019  EXAMINATION: PELVIC ULTRASOUND 5/2/2019 TECHNIQUE: Transabdominal and transvaginal pelvic ultrasound was performed. COMPARISON: None HISTORY: ORDERING SYSTEM PROVIDED HISTORY: Pre-op testing TECHNOLOGIST PROVIDED HISTORY: Ordering Physician Provided Reason for Exam: pre-op testing Acuity: Acute Type of Exam: Initial FINDINGS: Uterus measures 5.4 x 2.5 x 3.7 cm. Endometrial stripe measures 3.1 mm. Neither ovary is visualized or evaluated. No suspicious mass or free fluid was demonstrated. Uterus appears small but otherwise unremarkable. Neither ovary is visualized or evaluated. Us Pelvis Complete    Result Date: 5/2/2019  EXAMINATION: PELVIC ULTRASOUND 5/2/2019 TECHNIQUE: Transabdominal and transvaginal pelvic ultrasound was performed. COMPARISON: None HISTORY: ORDERING SYSTEM PROVIDED HISTORY: Pre-op testing TECHNOLOGIST PROVIDED HISTORY: Ordering Physician Provided Reason for Exam: pre-op testing Acuity: Acute Type of Exam: Initial FINDINGS: Uterus measures 5.4 x 2.5 x 3.7 cm. Endometrial stripe measures 3.1 mm. Neither ovary is visualized or evaluated. No suspicious mass or free fluid was demonstrated. Uterus appears small but otherwise unremarkable. Neither ovary is visualized or evaluated. DIAGNOSIS & PLAN:  1. Recurrent Dysplasia   - Proceed with planned procedure: RALH BSO with Cystoscopy   - Consent signed, on chart. - The patient is ready for transport to the operative suite.       Uday Batres DO  Ob/Gyn Resident  Pager: [phone]  Vonnie Chapin  5/16/2019, 8:19 AM

## 2019-05-17 VITALS
HEART RATE: 84 BPM | RESPIRATION RATE: 16 BRPM | TEMPERATURE: 97 F | OXYGEN SATURATION: 100 % | SYSTOLIC BLOOD PRESSURE: 132 MMHG | DIASTOLIC BLOOD PRESSURE: 71 MMHG | HEIGHT: 63 IN | WEIGHT: 243 LBS | BODY MASS INDEX: 43.05 KG/M2

## 2019-05-17 LAB
ANION GAP SERPL CALCULATED.3IONS-SCNC: 12 MMOL/L (ref 9–17)
ANION GAP SERPL CALCULATED.3IONS-SCNC: 15 MMOL/L (ref 9–17)
BUN BLDV-MCNC: 25 MG/DL (ref 8–23)
BUN BLDV-MCNC: 26 MG/DL (ref 8–23)
BUN/CREAT BLD: ABNORMAL (ref 9–20)
BUN/CREAT BLD: ABNORMAL (ref 9–20)
CALCIUM SERPL-MCNC: 7.7 MG/DL (ref 8.6–10.4)
CALCIUM SERPL-MCNC: 8.2 MG/DL (ref 8.6–10.4)
CHLORIDE BLD-SCNC: 94 MMOL/L (ref 98–107)
CHLORIDE BLD-SCNC: 95 MMOL/L (ref 98–107)
CO2: 20 MMOL/L (ref 20–31)
CO2: 20 MMOL/L (ref 20–31)
CREAT SERPL-MCNC: 0.89 MG/DL (ref 0.5–0.9)
CREAT SERPL-MCNC: 1.05 MG/DL (ref 0.5–0.9)
GFR AFRICAN AMERICAN: >60 ML/MIN
GFR AFRICAN AMERICAN: >60 ML/MIN
GFR NON-AFRICAN AMERICAN: 52 ML/MIN
GFR NON-AFRICAN AMERICAN: >60 ML/MIN
GFR SERPL CREATININE-BSD FRML MDRD: ABNORMAL ML/MIN/{1.73_M2}
GLUCOSE BLD-MCNC: 115 MG/DL (ref 65–105)
GLUCOSE BLD-MCNC: 122 MG/DL (ref 70–99)
GLUCOSE BLD-MCNC: 134 MG/DL (ref 70–99)
GLUCOSE BLD-MCNC: 85 MG/DL (ref 65–105)
HCT VFR BLD CALC: 23.8 % (ref 36–46)
HCT VFR BLD CALC: 24.3 % (ref 36–46)
HEMOGLOBIN: 7.7 G/DL (ref 12–16)
HEMOGLOBIN: 8 G/DL (ref 12–16)
POTASSIUM SERPL-SCNC: 4.6 MMOL/L (ref 3.7–5.3)
POTASSIUM SERPL-SCNC: 4.8 MMOL/L (ref 3.7–5.3)
SODIUM BLD-SCNC: 127 MMOL/L (ref 135–144)
SODIUM BLD-SCNC: 129 MMOL/L (ref 135–144)

## 2019-05-17 PROCEDURE — 6370000000 HC RX 637 (ALT 250 FOR IP): Performed by: STUDENT IN AN ORGANIZED HEALTH CARE EDUCATION/TRAINING PROGRAM

## 2019-05-17 PROCEDURE — 2580000003 HC RX 258: Performed by: STUDENT IN AN ORGANIZED HEALTH CARE EDUCATION/TRAINING PROGRAM

## 2019-05-17 PROCEDURE — 36415 COLL VENOUS BLD VENIPUNCTURE: CPT

## 2019-05-17 PROCEDURE — 2500000003 HC RX 250 WO HCPCS: Performed by: STUDENT IN AN ORGANIZED HEALTH CARE EDUCATION/TRAINING PROGRAM

## 2019-05-17 PROCEDURE — 82947 ASSAY GLUCOSE BLOOD QUANT: CPT

## 2019-05-17 PROCEDURE — 85018 HEMOGLOBIN: CPT

## 2019-05-17 PROCEDURE — 6360000002 HC RX W HCPCS: Performed by: STUDENT IN AN ORGANIZED HEALTH CARE EDUCATION/TRAINING PROGRAM

## 2019-05-17 PROCEDURE — G0378 HOSPITAL OBSERVATION PER HR: HCPCS

## 2019-05-17 PROCEDURE — 85014 HEMATOCRIT: CPT

## 2019-05-17 PROCEDURE — 80048 BASIC METABOLIC PNL TOTAL CA: CPT

## 2019-05-17 RX ORDER — LANOLIN ALCOHOL/MO/W.PET/CERES
325 CREAM (GRAM) TOPICAL 2 TIMES DAILY
Qty: 90 TABLET | Refills: 3 | Status: SHIPPED | OUTPATIENT
Start: 2019-05-17

## 2019-05-17 RX ADMIN — LEVOTHYROXINE SODIUM 75 MCG: 75 TABLET ORAL at 05:39

## 2019-05-17 RX ADMIN — HYDROCHLOROTHIAZIDE 12.5 MG: 12.5 CAPSULE ORAL at 09:19

## 2019-05-17 RX ADMIN — SIMVASTATIN 20 MG: 20 TABLET, FILM COATED ORAL at 09:19

## 2019-05-17 RX ADMIN — OXYCODONE HYDROCHLORIDE AND ACETAMINOPHEN 2 TABLET: 5; 325 TABLET ORAL at 03:24

## 2019-05-17 RX ADMIN — KETOROLAC TROMETHAMINE 30 MG: 30 INJECTION, SOLUTION INTRAMUSCULAR; INTRAVENOUS at 03:24

## 2019-05-17 RX ADMIN — FAMOTIDINE 20 MG: 10 INJECTION, SOLUTION INTRAVENOUS at 09:19

## 2019-05-17 RX ADMIN — LISINOPRIL 20 MG: 20 TABLET ORAL at 09:19

## 2019-05-17 RX ADMIN — SENNOSIDES AND DOCUSATE SODIUM 1 TABLET: 8.6; 5 TABLET ORAL at 09:19

## 2019-05-17 RX ADMIN — AMLODIPINE BESYLATE 5 MG: 5 TABLET ORAL at 09:19

## 2019-05-17 RX ADMIN — HEPARIN SODIUM 5000 UNITS: 5000 INJECTION INTRAVENOUS; SUBCUTANEOUS at 09:19

## 2019-05-17 RX ADMIN — CEFAZOLIN 1 G: 1 INJECTION, POWDER, FOR SOLUTION INTRAMUSCULAR; INTRAVENOUS at 03:24

## 2019-05-17 RX ADMIN — MAGNESIUM HYDROXIDE 30 ML: 400 SUSPENSION ORAL at 09:20

## 2019-05-17 RX ADMIN — IBUPROFEN 600 MG: 600 TABLET, FILM COATED ORAL at 09:20

## 2019-05-17 ASSESSMENT — PAIN SCALES - GENERAL
PAINLEVEL_OUTOF10: 5
PAINLEVEL_OUTOF10: 7

## 2019-05-17 NOTE — CARE COORDINATION
CASE MANAGEMENT NOTE:    Admission Date:  5/16/2019 Naila Peterson is a 78 y.o.  female    Admitted for : Postoperative state [Z98.890]    Met with:  Patient    PCP:  Dr. Isra Cole:  Medicare       Current Residence/ Living Arrangements:  independently at home             Current Services PTA:  No    Is patient agreeable to VNS: No    Freedom of choice provided: Yes    List of [address] provided: No    VNS chosen:  No    DME:  straight cane, GB    Home Oxygen: No    Nebulizer: No    CPAP/BIPAP: No    Supplier: N/A    Potential Assistance Needed: No    SNF needed: No    Ph[facility]:  [facility] on [address]       Is the Patient an [employer] with Readmission Risk Score greater than 14%? No  If yes, pt needs a follow up appointment made within 7 days. Does Patient want to use MEDS to BEDS? No    Family Members/Caregivers that pt would like involved in their care:    Yes    If yes, list name here:  Pola Bolivar and Gabrielle Zimmer    Transportation Provider:  Patient             Is patient in Isolation/One on One/Altered Mental Status? No  If yes, skip next question. If no, would they like an I-Pad to  use? No  If yes, call [phone]. Discharge Plan:  5/17: MEDICARE - Patient is from 1-story home alone. She is independent with her care and drives and has good family support. DME - Cane and [employer]. Declines need for VNS services. Should discharge to home later today. Will continue to follow along.  //REYNA                 Electronically signed by: Sintia Crabtree RN on 5/17/2019 at 10:07 AM

## 2019-05-17 NOTE — PROGRESS NOTES
Dr. Abel Camejo from [facility] calls, states the pt's howe can come out if the patient wants it out. Writer asked pt if she wants it out, she states she wants to wait until the morning. Will continue to monitor.

## 2019-05-17 NOTE — PROGRESS NOTES
Pt ambulated in hallway, to nurses station and back, with this RN. Tolerated very well. Pt sat in chair for a few hours afterward as well.

## 2019-05-17 NOTE — PLAN OF CARE
Problem: SAFETY  Goal: Free from accidental physical injury  5/17/2019 0414 by Irene Bryson RN  Outcome: Ongoing  Pt. Free of falls and injuries this shift. Problem: PAIN  Goal: Patient's pain/discomfort is manageable  5/17/2019 0414 by Irene Bryson RN  Outcome: Ongoing   Adequate pain control achieved this shift. See MAR.

## 2019-05-17 NOTE — FLOWSHEET NOTE
05/17/19 1500   Encounter Summary   Services provided to: Patient   Referral/Consult From: [facility]  (Daughter present)   Continue Visiting   (5/17/19)   Complexity of Encounter Low   Length of Encounter 15 minutes   Routine   Type Initial   Assessment Approachable  (Waiting to be discharged)   Intervention Sustaining presence/ Ministry of presence   Outcome Expressed gratitude;Receptive

## 2019-05-17 NOTE — PROGRESS NOTES
Gynecology Progress Note      Bailey Wang is a 78 y.o. female , POD # 1 s/p RALH BSO with Cystoscopy and Retrograde Instillation of Bladder    Patient seen and examined. She is resting comfortably in bed. Pain is controlled with PO pain meds. Patient is  tolerating oral intake. She is urinating well into her howe which will be removed this morning. She denies any vaginal bleeding. She is  ambulating without difficulty. She is  passing flatus. She denies Fever/Chills, Chest Pain, SOB, N/V, Calf Pain. She would like to go home today if possible. Vitals:  Vitals:    19 1320 19 1345 19 1801 19 2357   BP: 123/71 108/61 (!) 109/52 137/64   Pulse: 76 85 90 70   Resp:    Temp: 97.5 °F (36.4 °C) 97.3 °F (36.3 °C) 98.4 °F (36.9 °C) 98.2 °F (36.8 °C)   TempSrc:  Oral Oral Oral   SpO2: 99% 96% 97% 94%   Weight:       Height:             Intake/Output:   Last Shift: I/O last 3 completed shifts: In: 3139.6 [I.V.:3139.6]  Out: 995 [Urine:975; Blood:20]  Current Shift: No intake/output data recorded. 350 clr UOP/ 11 hr    31 ml/hr    Physical Exam:  General:  no apparent distress, alert and cooperative  Neurologic:  alert, oriented, normal speech, no focal findings or movement disorder noted  Lungs:  No increased work of breathing, good air exchange, clear to auscultation bilaterally, no crackles or wheezing  Heart:  regular rate and rhythm and no murmur    Abdomen: soft, non-distended, appropriate tenderness, no CVA tenderness, normal bowel sounds  Incision: clean, dry and intact with dermabond in place  Extremities:  no calf tenderness, non edematous, EPCs on and functioning    Lab:  .   Recent Results (from the past 12 hour(s))   POC Glucose Fingerstick    Collection Time: 19  8:21 PM   Result Value Ref Range    POC Glucose 196 (H) 65 - 105 mg/dL   POC Glucose Fingerstick    Collection Time: 19  6:21 AM   Result Value Ref Range    POC Glucose 115 (H) 65 - 105 mg/dL   Hemoglobin and hematocrit, blood    Collection Time: 05/17/19  6:59 AM   Result Value Ref Range    Hemoglobin 7.7 (L) 12.0 - 16.0 g/dL    Hematocrit 23.8 (L) 36 - 46 %         Assessment/Plan:  Virgen Machado 78 y.o. female [id number], POD # 1 s/p RALH BSO with Cystoscopy and Retrograde Instillation of Bladder   - Doing well, vitals stable   - remove howe catheter, UOP appropriate   - Encourage ambulation and use of incentive spirometer   - Pain control: Motrin/Percocet   - Labs: H/H, BMP pending this AM   - DVT Proph: Heparin 5000U x 2 doses, SCDs   - Abx: s/p 24 hours Ancef   - Diet: Diabetic   - IVF: NS @ 125 ml.hr   - Path: pending    HTN   - HCTZ/Lisinopril 12.5 mg/20 mg   - Norvasc 5 mg    DMII   - Diabetic Diet   - AC/HS blood sugars   - Low dose ISS    Hypothyroidism   - 75 mcg Synthroid    Anemia of acute blood loss   - Hgb down to 7.7 this AM from 10.1 on 5/2   - VSS   - Denies s/s of anemia   - Will recheck Hgb at 1400   - Iron on discharge    Principal Problem:    RALH BSO w/ Cystoscopy & Retrograde Bladder Instillation 5/16/19  Active Problems:    HPV in female    Recurrent Dysplasia    Lichen sclerosus of female genitalia    LEEP w/[place] 12/18/18    Hypertension    Hypothyroidism    Postoperative state    [place] grade 2 cystocele    Urinary incontinence  Resolved Problems:    * No resolved hospital problems. *      Please page the resident named below with questions and concerns. Patrice Pandya DO  OBGYN Resident  Pgr: [phone]  5/17/2019  7:20 AM           Attending Physician Statement  I have discussed the care of Virgen Machado, including pertinent history and exam findings,  with the resident. I have seen and examined the patient and the key elements of all parts of the encounter have been performed by me. I agree with the assessment, plan and orders as documented by the resident. (GC Modifier)    Pt seen and examined. She states she is feeling well.   She has been ambulating. She is tolerating PO. Romeo catheter in place with adequate output. She denies bleeding. She denies any CP/SOB/F/CH/N/V/HA. Vitals:    05/16/19 1345 05/16/19 1801 05/16/19 2357 05/17/19 0741   BP: 108/61 (!) 109/52 137/64 (!) 109/47   Pulse: 85 90 70 75   Resp: 16 16 18 12   Temp: 97.3 °F (36.3 °C) 98.4 °F (36.9 °C) 98.2 °F (36.8 °C) 97 °F (36.1 °C)   TempSrc: Oral Oral Oral Oral   SpO2: 96% 97% 94% 94%   Weight:       Height:         Recent Results (from the past 24 hour(s))   POC Glucose Fingerstick    Collection Time: 05/16/19  8:37 AM   Result Value Ref Range    POC Glucose 85 65 - 105 mg/dL   Urinalysis Reflex to Culture    Collection Time: 05/16/19 10:21 AM   Result Value Ref Range    Color, UA YELLOW YELLOW    Turbidity UA CLEAR CLEAR    Glucose, Ur NEGATIVE NEGATIVE    Bilirubin Urine NEGATIVE NEGATIVE    Ketones, Urine NEGATIVE NEGATIVE    Specific Gravity, UA 1.008 1.000 - 1.030    Urine Hgb NEGATIVE NEGATIVE    pH, UA 7.0 5.0 - 8.0    Protein, UA NEGATIVE NEGATIVE    Urobilinogen, Urine Normal Normal    Nitrite, Urine NEGATIVE NEGATIVE    Leukocyte Esterase, Urine NEGATIVE NEGATIVE    Urinalysis Comments       Microscopic exam not performed based on chemical results unless requested in original order.    POC Glucose Fingerstick    Collection Time: 05/16/19 12:29 PM   Result Value Ref Range    POC Glucose 145 (H) 65 - 105 mg/dL   POC Glucose Fingerstick    Collection Time: 05/16/19  4:11 PM   Result Value Ref Range    POC Glucose 127 (H) 65 - 105 mg/dL   POC Glucose Fingerstick    Collection Time: 05/16/19  8:21 PM   Result Value Ref Range    POC Glucose 196 (H) 65 - 105 mg/dL   POC Glucose Fingerstick    Collection Time: 05/17/19  6:21 AM   Result Value Ref Range    POC Glucose 115 (H) 65 - 105 mg/dL   Basic Metabolic Panel w/ Reflex to MG    Collection Time: 05/17/19  6:59 AM   Result Value Ref Range    Glucose 122 (H) 70 - 99 mg/dL    BUN 26 (H) 8 - 23 mg/dL    CREATININE 1.05 (H) 0.50 - 0.90 mg/dL    Bun/Cre Ratio NOT REPORTED 9 - 20    Calcium 8.2 (L) 8.6 - 10.4 mg/dL    Sodium 127 (L) 135 - 144 mmol/L    Potassium 4.8 3.7 - 5.3 mmol/L    Chloride 95 (L) 98 - 107 mmol/L    CO2 20 20 - 31 mmol/L    Anion Gap 12 9 - 17 mmol/L    GFR Non-African American 52 (L) >60 mL/min    GFR African American >60 >60 mL/min    GFR Comment          GFR Staging NOT REPORTED    Hemoglobin and hematocrit, blood    Collection Time: 05/17/19  6:59 AM   Result Value Ref Range    Hemoglobin 7.7 (L) 12.0 - 16.0 g/dL    Hematocrit 23.8 (L) 36 - 46 %     Gen: AOx3, NAD, cooperative  Heart: RRR  Lungs: CTA BL  Abd: soft, non acute  Inc: healing well, C/D/I  Ext: no c/c/e, SCDs in place. POD#1 RALH w/ BS, cystoscopy    JORGE - recheck BMP prior to discharge. Cysto done and bladder and ureteral integrity confirmed. Possibly secondary to medications    Hyponatremia - recheck BMP prior to discharge    Acute on chronic anemia - Recheck H&H prior to discharge, start Iron    HTN - controlled    DM II - controlled with sliding scale at this time. Discussed importance of glucose control and healing. Pt asympstomatic at this time. VSS. Anticipate D/C today. Continue current management. Discharge Instructions for Hysterectomy     A hysterectomy is a surgical procedure to remove the uterus. The procedure may be done alone or with the removal or repair of ovaries, fallopian tubes, the cervix, and part of the vagina. Depending on your surgery, the hospital stay varies from 1-5 days. Recovery can take 6-8 weeks. What You Will Need   Dressing supplies   Steps to [address]    · Ask your doctor about when it is safe to shower, bathe, or soak in water. You may be advised to shower instead of taking a bath for the first two weeks after surgery. · Keep your incision sites clean and dry. · Wash your hands before changing the dressing.     · Do not douche or put anything in your vagina, such as a tampon, until your doctor tells you otherwise. Diet    While in the hospital, you will progress from intravenous fluid to a normal diet. · If recommended by your doctor, eat a high-fiber diet to promote healing and prevent constipation . · Drink plenty of fluids. Physical Activity    · Ask your doctor when you will be able to return to work and drive. · Return to your normal activities gradually. Most normal activities, including sex, can be resumed in about six weeks. · Take daily walks as tolerated. · Avoid heavy lifting and strenuous exercise until your doctor gives you permission to do so. · Ask your doctor when and how to perform Kegel exercises . These exercises can strengthen the muscles of the pelvic floor and prevent or improve urinary incontinence , as well as enhance sexual pleasure. Medications    If you had to stop medicines before the procedure, ask your doctor when you can start again. Medicines often stopped include:   · Anti-inflammatory drugs (eg, aspirin )   · Blood thinners, like clopidogrel (Plavix) or warfarin (Coumadin)   You will likely go home with a prescription for pain medicine. If you had your ovaries removed with your uterus, you may be given an estrogen supplement. Also, to prevent constipation, your doctor may recommend a stool softener. If you are taking medicines, follow these general guidelines:   · Take your medicine as directed. Do not change the amount or the schedule. · Do not stop taking them without talking to your doctor. · Do not share them. · Know what the results and side effects. Report them to your doctor. · Some drugs can be dangerous when mixed. Talk to a doctor or pharmacist if you are taking more than one drug. This includes over-the-counter medicine and herb or dietary supplements. · Plan ahead for refills so you do not run out. Lifestyle Changes    You and your doctor will plan lifestyle changes that will aid in your recovery.  Some issues that may affect you include:   · You will no longer have monthly periods, and you can no longer get pregnant. Birth control is not necessary. · If your ovaries are removed, you may experience menopausal symptoms, which can be controlled with medicine. · You may have a change in your sexual response. If your uterus has been removed, uterine contractions you may have felt during orgasm will no longer occur. · If the ovaries have been removed, vaginal dryness may be a problem. Estrogen can help relieve this. · You may enjoy sex more because you no longer feel pain from the condition. · If you experience a sense of loss or feel depressed after your surgery, it is important to talk to your doctor about these feelings since they can be treated. Follow-up   Schedule a follow-up appointment as directed by your doctor. · If you have had a subtotal hysterectomy (meaning you still have your cervix), continue to have regular Pap smears . · If you had this procedure because of cancer, other treatment, such as radiation or hormonal therapy, may be used as well. Call Your Doctor If Any of the Following Occurs   It is important for you to monitor your recovery once you leave the hospital. That way, you can alert your doctor to any problems immediately.  If any of the following occurs, call your doctor:   · Signs of infection, including fever and chills   · Redness, swelling, increasing pain, excessive bleeding, leakage, or any discharge from the incision site   · Incision opens up   · Nausea and/or vomiting that you cannot control with the medicines you were given after surgery, or which persist for more than two days after discharge from the hospital   · Dizziness or fainting   · Cough, shortness of breath, or chest pain   · Heavy bleeding   · Pain that you cannot control with the medicines you have been given   · Pain, burning, urgency or frequency of urination, or persistent bleeding in the urine · Swelling, redness, or pain in your leg   In case of an emergency,  CALL 911  immediately.          Lester Smith, DO

## 2019-05-17 NOTE — PLAN OF CARE
Problem: SAFETY  Goal: Free from accidental physical injury  5/17/2019 1443 by Hipolito Zimmer RN  Outcome: Ongoing  5/17/2019 0414 by Gissell Parks RN  Outcome: Ongoing  Goal: Free from intentional harm  5/17/2019 1443 by Hipolito Zimmer RN  Outcome: Ongoing  5/17/2019 0414 by Gissell Parks RN  Outcome: Ongoing     Problem: DAILY CARE  Goal: Daily care needs are met  5/17/2019 1443 by Hipolito Zimmer RN  Outcome: Ongoing  5/17/2019 0414 by Gissell Parks RN  Outcome: Ongoing     Problem: PAIN  Goal: Patient's pain/discomfort is manageable  5/17/2019 1443 by Hipolito Zimmer RN  Outcome: Ongoing  5/17/2019 0414 by Gissell Parks RN  Outcome: Ongoing    No injury noted. Patient uses call light appropriately. No distress noted. Patient medicated with po motrin with relief noted.   Patient ambulated up in halls without difficulty. '    Electronically signed by Hipolito Zimmer RN on 5/17/2019 at 2:46 PM

## 2019-05-17 NOTE — ANESTHESIA POST-OP
POD #1 s/p HYSTERECTOMY ABDOMINAL LAPAROSCOPIC ROBOTIC W/BSO, RETROGRADE BLADDER INSTILLATION & CYSTO    Patient seen walking around her room. Denies any anesthesia related issues.

## 2019-05-20 LAB — SURGICAL PATHOLOGY REPORT: NORMAL

## 2019-05-30 ENCOUNTER — OFFICE VISIT (OUTPATIENT)
Dept: OBGYN CLINIC | Age: 69
End: 2019-05-30

## 2019-05-30 VITALS
RESPIRATION RATE: 16 BRPM | SYSTOLIC BLOOD PRESSURE: 116 MMHG | BODY MASS INDEX: 43.56 KG/M2 | DIASTOLIC BLOOD PRESSURE: 70 MMHG | WEIGHT: 242 LBS

## 2019-05-30 DIAGNOSIS — N39.46 MIXED STRESS AND URGE URINARY INCONTINENCE: ICD-10-CM

## 2019-05-30 DIAGNOSIS — Z90.710 S/P HYSTERECTOMY: Primary | ICD-10-CM

## 2019-05-30 DIAGNOSIS — N81.10 BADEN-WALKER GRADE 2 CYSTOCELE: ICD-10-CM

## 2019-05-30 PROCEDURE — 99024 POSTOP FOLLOW-UP VISIT: CPT | Performed by: OBSTETRICS & GYNECOLOGY

## 2019-05-30 NOTE — PROGRESS NOTES
Rebecca Alvares  5/30/2019  11:47 AM      Rebecca Alvares  Procedure: RALH w/ BSO, cysto      Rebecca Alvares is a 78 y.o. female [id number]      The patient was seen, she denies any complaints. She denied any shortness of breath, chest pain or dizziness. She denied any nausea, vomiting, or diarrhea. There is no fever, chills, or rigors. The patient denies any vaginal bleeding, discharge or odor. All of her pre-operative complaints are now resolved. Blood pressure 116/70, resp. rate 16, weight 242 lb (109.8 kg), not currently breastfeeding. Abdominal Exam: soft non-tender. Good bowel sounds. No guarding, rebound or rigidity. No costal vertebral angle tenderness bilateral. No hernias    Incision: healing well, no drainage, well approximated    Extremities: No edema or calf pain noted bilaterally. Pelvic Exam:Exam deferred. Results for orders placed or performed during the hospital encounter of 05/16/19   Protime-INR   Result Value Ref Range    Protime 12.9 11.8 - 14.6 sec    INR 1.0    APTT   Result Value Ref Range    PTT 36.6 (H) 24.0 - 36.0 sec   Urinalysis Reflex to Culture   Result Value Ref Range    Color, UA YELLOW YELLOW    Turbidity UA CLEAR CLEAR    Glucose, Ur NEGATIVE NEGATIVE    Bilirubin Urine NEGATIVE NEGATIVE    Ketones, Urine NEGATIVE NEGATIVE    Specific Gravity, UA 1.008 1.000 - 1.030    Urine Hgb NEGATIVE NEGATIVE    pH, UA 7.0 5.0 - 8.0    Protein, UA NEGATIVE NEGATIVE    Urobilinogen, Urine Normal Normal    Nitrite, Urine NEGATIVE NEGATIVE    Leukocyte Esterase, Urine NEGATIVE NEGATIVE    Urinalysis Comments       Microscopic exam not performed based on chemical results unless requested in original order. Surgical Pathology   Result Value Ref Range    Surgical Pathology Report       [address] [id number] [facility].   [place], [address] [facility]  [phone]  Fax: [phone]  SURGICAL PATHOLOGY REPORT    Patient Name: Abhilash Burnham  MR#: [id number]  Specimen #[id number]       Final Diagnosis  UTERUS WITH CONTIGUOUS CERVIX, HYSTERECTOMY:         ADENOMYOSIS    INACTIVE ENDOMETRIUM SHOWING CYSTIC DILATATION OF GLANDS    CONTIGUOUS UTERINE CERVIX SHOWING ZONES OF DENUDATION OF MUCOSAL  EPITHELIUM WITH THERMOCAUTERY EFFECTS AND SMALL AMOUNTS OF RESIDUAL  SQUAMOUS MUCOSA NEGATIVE FOR DYSPLASIA    ATTACHED BILATERAL PROXIMAL SEGMENTS OF FALLOPIAN TUBES WITH ROUNDED  AND OBLITERATED ENDS      PLEASE SEE COMMENT    SEPARATE BENIGN OVARY, OOPHORECTOMY    SEPARATE OVARY, OOPHORECTOMY:         CYSTIC FOLLICLES (UP TO 1.5 CM IN DIAMETER) AND SMALL CORTICAL  INCLUSION CYSTS    ADDITIONAL SEPARATE FRAGMENT OF UTERINE CERVIX WITH SQUAMOUS MUCOSA  NEGATIVE FOR DYSPLASIA    Diagnosis Comment  In the area around the external cervical os shows a disruptive s urface  with thermal cautery artifact. Zones of denudation of both the  exocervical and the endocervical mucosal epithelium are seen. The  small amounts of residual squamous mucosa is negative for dysplasia. Marie Cintron M.D.  **Electronically Signed Out**         [facility]/5/20/2019    Clinical Information  Recurrent low grade lesion, dysplasia; hysterectomy abdominal  laparoscopic robotic w/BSO, pelvic lavage & cysto; formalin time:  11:21    Source:  A: Uterus, cervix, bilateral ovaries & bilateral falllopian tubes    Gross Description  The specimen received in formalin consists of four components. No  other tissue is present in the specimen container. The first  component is an uterus resected below the cervix with attached  segments of proximal fallopian tubes with rounded ends. This  component weighs 45 grams. The uterus with the cervix measured 8 cm  along the long axis, 6 cm from wausp-dx-trevn and 3 cm in the  anterior-posterior dimension. The contiguous uterine cervix  measures  2.5 cm in length and 1.2 cm in diameter at the level of a partially  disrupted external cervical os.   The latter measures approximately 0.8  cm in diameter and is surrounded by a shaggy reddish gray tissue. The  outer aspects of the uterine cervix have been painted with blue ink. The serosa of the uterus is grayish pink and glistening. On the  anterior aspect, some red discoloration is noted. Sections of the  serosa are submitted labeled #1. The entire anterior half of the  uterine cervix with partially disrupted surfaces is submitted labeled  #2 and #3 and the entire posterior one half of the uterine cervix,  also disrupted walls is submitted labeled #4 and #5. Sections from  the region of the lower uterine segment and contiguous endocervix are  submitted labeled #6. The endometrial cavity is triangular and  measures 2 cm along the base and 3.8 cm along the long axis. It is  lined by pink endometrium measuring 0.1 cm in thickness. The attached  segments of the fall opian tubes measure 1 cm in length and 0.5 cm in  diameter. They have rounded ends. No fimbriae are seen. Sections of  these fallopian tube segments are in cassettes labeled #7 and #8. The  myometrium measures up to 1 cm in thickness. It presents with a  fleshy pink appearance with no grossly identifiable lesions. Multiple  representative sections through the anterior one half of the  endomyometrium are submitted labeled #9 and #10 and those from the  posterior half are submitted labeled #11 and #12. The next component  is an ovary with some attached purplish pink tissue. It measures 2 x  1.5 x 1 cm with a small paraovarian cyst containing straw-colored  fluid and measuring 0.5 cm in diameter. The external surface of the  ovary is grayish pink. The adjoining soft tissue has a hemorrhagic  cut surface. This entire component of the specimen is submitted  labeled #13 through #15. The next separate component is also a cystic  ovary measuring 2.5 x 1.5 x 1.5 cm with a cystic are a measuring 1.5 cm  in diameter.   The external surface of the cyst is pink

## 2019-06-04 DIAGNOSIS — N89.8 VAGINAL ITCHING: ICD-10-CM

## 2019-06-04 DIAGNOSIS — N90.4 LICHEN SCLEROSUS OF FEMALE GENITALIA: ICD-10-CM

## 2019-06-04 DIAGNOSIS — N90.89 VULVAR LESION: ICD-10-CM

## 2019-06-04 RX ORDER — CLOBETASOL PROPIONATE 0.5 MG/G
CREAM TOPICAL
Qty: 15 G | Refills: 0 | Status: SHIPPED | OUTPATIENT
Start: 2019-06-04 | End: 2019-10-31 | Stop reason: SDUPTHER

## 2019-07-10 ENCOUNTER — OFFICE VISIT (OUTPATIENT)
Dept: OBGYN CLINIC | Age: 69
End: 2019-07-10

## 2019-07-10 VITALS
BODY MASS INDEX: 43.02 KG/M2 | RESPIRATION RATE: 16 BRPM | SYSTOLIC BLOOD PRESSURE: 122 MMHG | WEIGHT: 239 LBS | DIASTOLIC BLOOD PRESSURE: 64 MMHG

## 2019-07-10 DIAGNOSIS — Z90.710 S/P HYSTERECTOMY: Primary | ICD-10-CM

## 2019-07-10 PROCEDURE — 99024 POSTOP FOLLOW-UP VISIT: CPT | Performed by: OBSTETRICS & GYNECOLOGY

## 2019-07-10 NOTE — PROGRESS NOTES
Urinalysis Comments       Microscopic exam not performed based on chemical results unless requested in original order. Surgical Pathology   Result Value Ref Range    Surgical Pathology Report       [address] [facility]  [address] [facility]. [place], [address] [place] [address]  [phone]  Fax: [phone]  SURGICAL PATHOLOGY REPORT    Patient Name: Emma Mathis  MR#: [id number]  Specimen #[id number]       Final Diagnosis  UTERUS WITH CONTIGUOUS CERVIX, HYSTERECTOMY:         ADENOMYOSIS    INACTIVE ENDOMETRIUM SHOWING CYSTIC DILATATION OF GLANDS    CONTIGUOUS UTERINE CERVIX SHOWING ZONES OF DENUDATION OF MUCOSAL  EPITHELIUM WITH THERMOCAUTERY EFFECTS AND SMALL AMOUNTS OF RESIDUAL  SQUAMOUS MUCOSA NEGATIVE FOR DYSPLASIA    ATTACHED BILATERAL PROXIMAL SEGMENTS OF FALLOPIAN TUBES WITH ROUNDED  AND OBLITERATED ENDS      PLEASE SEE COMMENT    SEPARATE BENIGN OVARY, OOPHORECTOMY    SEPARATE OVARY, OOPHORECTOMY:         CYSTIC FOLLICLES (UP TO 1.5 CM IN DIAMETER) AND SMALL CORTICAL  INCLUSION CYSTS    ADDITIONAL SEPARATE FRAGMENT OF UTERINE CERVIX WITH SQUAMOUS MUCOSA  NEGATIVE FOR DYSPLASIA    Diagnosis Comment  In the area around the external cervical os shows a disruptive s urface  with thermal cautery artifact. Zones of denudation of both the  exocervical and the endocervical mucosal epithelium are seen. The  small amounts of residual squamous mucosa is negative for dysplasia. Linda Hernandez M.D.  **Electronically Signed Out**         [facility]/5/20/2019    Clinical Information  Recurrent low grade lesion, dysplasia; hysterectomy abdominal  laparoscopic robotic w/BSO, pelvic lavage & cysto; formalin time:  11:21    Source:  A: Uterus, cervix, bilateral ovaries & bilateral falllopian tubes    Gross Description  The specimen received in formalin consists of four components. No  other tissue is present in the specimen container.   The first  component is an uterus resected below the cervix with

## 2019-08-05 DIAGNOSIS — N89.8 VAGINAL ITCHING: ICD-10-CM

## 2019-08-05 DIAGNOSIS — N90.89 VULVAR LESION: ICD-10-CM

## 2019-08-05 DIAGNOSIS — N90.4 LICHEN SCLEROSUS OF FEMALE GENITALIA: ICD-10-CM

## 2019-08-06 RX ORDER — CLOBETASOL PROPIONATE 0.5 MG/G
CREAM TOPICAL
Qty: 15 G | Refills: 0 | Status: SHIPPED | OUTPATIENT
Start: 2019-08-06 | End: 2019-10-01 | Stop reason: SDUPTHER

## 2019-09-16 ENCOUNTER — ANESTHESIA EVENT (OUTPATIENT)
Dept: ENDOSCOPY | Age: 69
End: 2019-09-16
Payer: MEDICARE

## 2019-09-16 ENCOUNTER — ANESTHESIA (OUTPATIENT)
Dept: ENDOSCOPY | Age: 69
End: 2019-09-16
Payer: MEDICARE

## 2019-09-16 ENCOUNTER — HOSPITAL ENCOUNTER (OUTPATIENT)
Age: 69
Setting detail: OUTPATIENT SURGERY
Discharge: HOME | End: 2019-09-16
Attending: SURGERY | Admitting: SURGERY
Payer: MEDICARE

## 2019-09-16 VITALS
OXYGEN SATURATION: 95 % | SYSTOLIC BLOOD PRESSURE: 116 MMHG | HEIGHT: 63 IN | TEMPERATURE: 97.8 F | RESPIRATION RATE: 20 BRPM | DIASTOLIC BLOOD PRESSURE: 74 MMHG | WEIGHT: 240 LBS | HEART RATE: 92 BPM | BODY MASS INDEX: 42.52 KG/M2

## 2019-09-16 VITALS
RESPIRATION RATE: 21 BRPM | SYSTOLIC BLOOD PRESSURE: 95 MMHG | TEMPERATURE: 96.8 F | OXYGEN SATURATION: 92 % | DIASTOLIC BLOOD PRESSURE: 56 MMHG

## 2019-09-16 LAB — GLUCOSE BLD-MCNC: 97 MG/DL (ref 65–105)

## 2019-09-16 PROCEDURE — 7100000000 HC PACU RECOVERY - FIRST 15 MIN: Performed by: SURGERY

## 2019-09-16 PROCEDURE — 3700000001 HC ADD 15 MINUTES (ANESTHESIA): Performed by: SURGERY

## 2019-09-16 PROCEDURE — 88341 IMHCHEM/IMCYTCHM EA ADD ANTB: CPT

## 2019-09-16 PROCEDURE — 88305 TISSUE EXAM BY PATHOLOGIST: CPT

## 2019-09-16 PROCEDURE — 2580000003 HC RX 258: Performed by: ANESTHESIOLOGY

## 2019-09-16 PROCEDURE — 94640 AIRWAY INHALATION TREATMENT: CPT

## 2019-09-16 PROCEDURE — 6370000000 HC RX 637 (ALT 250 FOR IP): Performed by: SURGERY

## 2019-09-16 PROCEDURE — 3700000000 HC ANESTHESIA ATTENDED CARE: Performed by: SURGERY

## 2019-09-16 PROCEDURE — 3609012400 HC EGD TRANSORAL BIOPSY SINGLE/MULTIPLE: Performed by: SURGERY

## 2019-09-16 PROCEDURE — 82947 ASSAY GLUCOSE BLOOD QUANT: CPT

## 2019-09-16 PROCEDURE — 6360000002 HC RX W HCPCS: Performed by: NURSE ANESTHETIST, CERTIFIED REGISTERED

## 2019-09-16 PROCEDURE — 88342 IMHCHEM/IMCYTCHM 1ST ANTB: CPT

## 2019-09-16 PROCEDURE — 6370000000 HC RX 637 (ALT 250 FOR IP): Performed by: ANESTHESIOLOGY

## 2019-09-16 PROCEDURE — 2709999900 HC NON-CHARGEABLE SUPPLY: Performed by: SURGERY

## 2019-09-16 PROCEDURE — 94664 DEMO&/EVAL PT USE INHALER: CPT

## 2019-09-16 PROCEDURE — 7100000001 HC PACU RECOVERY - ADDTL 15 MIN: Performed by: SURGERY

## 2019-09-16 PROCEDURE — 2500000003 HC RX 250 WO HCPCS: Performed by: NURSE ANESTHETIST, CERTIFIED REGISTERED

## 2019-09-16 RX ORDER — SODIUM CHLORIDE 9 MG/ML
INJECTION, SOLUTION INTRAVENOUS CONTINUOUS
Status: DISCONTINUED | OUTPATIENT
Start: 2019-09-16 | End: 2019-09-16 | Stop reason: HOSPADM

## 2019-09-16 RX ORDER — LIDOCAINE HYDROCHLORIDE 20 MG/ML
15 SOLUTION OROPHARYNGEAL ONCE
Status: COMPLETED | OUTPATIENT
Start: 2019-09-16 | End: 2019-09-16

## 2019-09-16 RX ORDER — PROPOFOL 10 MG/ML
INJECTION, EMULSION INTRAVENOUS PRN
Status: DISCONTINUED | OUTPATIENT
Start: 2019-09-16 | End: 2019-09-16 | Stop reason: SDUPTHER

## 2019-09-16 RX ORDER — IPRATROPIUM BROMIDE AND ALBUTEROL SULFATE 2.5; .5 MG/3ML; MG/3ML
1 SOLUTION RESPIRATORY (INHALATION) ONCE
Status: COMPLETED | OUTPATIENT
Start: 2019-09-16 | End: 2019-09-16

## 2019-09-16 RX ORDER — LIDOCAINE HYDROCHLORIDE 20 MG/ML
INJECTION, SOLUTION EPIDURAL; INFILTRATION; INTRACAUDAL; PERINEURAL PRN
Status: DISCONTINUED | OUTPATIENT
Start: 2019-09-16 | End: 2019-09-16 | Stop reason: SDUPTHER

## 2019-09-16 RX ADMIN — SODIUM CHLORIDE: 9 INJECTION, SOLUTION INTRAVENOUS at 06:58

## 2019-09-16 RX ADMIN — IPRATROPIUM BROMIDE AND ALBUTEROL SULFATE 1 AMPULE: .5; 3 SOLUTION RESPIRATORY (INHALATION) at 07:30

## 2019-09-16 RX ADMIN — PROPOFOL 20 MG: 10 INJECTION, EMULSION INTRAVENOUS at 08:05

## 2019-09-16 RX ADMIN — PROPOFOL 30 MG: 10 INJECTION, EMULSION INTRAVENOUS at 08:03

## 2019-09-16 RX ADMIN — PROPOFOL 20 MG: 10 INJECTION, EMULSION INTRAVENOUS at 07:59

## 2019-09-16 RX ADMIN — GLUCAGON HYDROCHLORIDE 1 MG: KIT at 08:07

## 2019-09-16 RX ADMIN — PROPOFOL 30 MG: 10 INJECTION, EMULSION INTRAVENOUS at 07:54

## 2019-09-16 RX ADMIN — PROPOFOL 30 MG: 10 INJECTION, EMULSION INTRAVENOUS at 07:56

## 2019-09-16 RX ADMIN — PROPOFOL 50 MG: 10 INJECTION, EMULSION INTRAVENOUS at 07:52

## 2019-09-16 RX ADMIN — LIDOCAINE HYDROCHLORIDE 100 MG: 20 INJECTION, SOLUTION EPIDURAL; INFILTRATION; INTRACAUDAL at 07:49

## 2019-09-16 RX ADMIN — PROPOFOL 30 MG: 10 INJECTION, EMULSION INTRAVENOUS at 08:08

## 2019-09-16 RX ADMIN — LIDOCAINE HYDROCHLORIDE 15 ML: 20 SOLUTION ORAL; TOPICAL at 07:39

## 2019-09-16 ASSESSMENT — PULMONARY FUNCTION TESTS
PIF_VALUE: 1

## 2019-09-16 ASSESSMENT — PAIN - FUNCTIONAL ASSESSMENT: PAIN_FUNCTIONAL_ASSESSMENT: 0-10

## 2019-09-16 ASSESSMENT — PAIN SCALES - GENERAL: PAINLEVEL_OUTOF10: 0

## 2019-09-16 NOTE — ANESTHESIA POSTPROCEDURE EVALUATION
Department of Anesthesiology  Postprocedure Note    Patient: Gregory Carrel  MRN: [id number]  YOB: 1950  Date of evaluation: 9/16/2019  Time:  10:10 AM     Procedure Summary     Date:  09/16/19 Room / Location:  [address] [place] [address] ENDO 04 / 250 [facility] ENDO    Anesthesia Start:  4969 Anesthesia Stop:  8105    Procedure:  EGD BIOPSY (N/A Esophagus) Diagnosis:  (JARAMILLO'S ESOPHAGEAL  (PAT PER ANES ON ADMIT))    Surgeon:  Candance Robe, MD Responsible Provider:  Breanna Stewart MD    Anesthesia Type:  MAC ASA Status:  3          Anesthesia Type: MAC    Miguel Ángel Phase I: Miguel Ángel Score: 10    Miguel Ángel Phase II:      Last vitals: Reviewed and per EMR flowsheets.        Anesthesia Post Evaluation    Comments: POST- ANESTHESIA EVALUATION       Pt Name: Gregory Carrel  MRN: [id number]  YOB: 1950  Date of evaluation: 9/16/2019  Time:  10:10 AM      /74   Pulse 92   Temp 97.8 °F (36.6 °C) (Infrared)   Resp 20   Ht 5' 2.5\" (1.588 m)   Wt 240 lb (108.9 kg)   SpO2 95%   BMI 43.20 kg/m²      Consciousness Level  Awake  Cardiopulmonary Status  Stable  Pain Adequately Treated YES  Nausea / Vomiting  NO  Adequate Hydration  YES  Anesthesia Related Complications NONE      Electronically signed by Dagoberto Barger MD on 9/16/2019 at 10:10 AM

## 2019-09-16 NOTE — H&P
HISTORY and Ronnie Grady 5747       NAME:  Radha Hernandez  MRN: [id number]   YOB: 1950   Date: 9/16/2019   Age: 78 y.o. Gender: female       COMPLAINT AND PRESENT HISTORY:                Radha Hernandez is 78 y.o.,  female, undergoing  for EGD. Patient has had prior EGD with hx of antral ulcer, GERD and Small's Esophageal. Pt is on PPI. Patient reports the nexium controls her heartburn. No epigastric pains, no nausea and no vomiting. No diarrhea / constipation, no changes in the color, caliber or consistency of the stools. No fever or chills. PAST MEDICAL HISTORY     Past Medical History:   Diagnosis Date    Abnormal Pap smear of cervix     Anemia     Small's esophagus     Chronic back pain     Chronic sinusitis     Diabetes mellitus (HCC)     Environmental allergies     GERD (gastroesophageal reflux disease)     Hyperlipidemia     Hypertension     OA (osteoarthritis of spine)     generalized.  PONV (postoperative nausea and vomiting)     Thyroid disease     Wears glasses     Wears glasses        SURGICAL HISTORY       Past Surgical History:   Procedure Laterality Date    APPENDECTOMY      CHOLECYSTECTOMY      COLONOSCOPY      COLPOSCOPY  06/15/2017    dr bella- has had previous colp before    ENDOSCOPY, COLON, DIAGNOSTIC      EGD    ENDOSCOPY, COLON, DIAGNOSTIC  09-05-14    barretts esoph, milliary lesions duodenal bulb, hiatal hernia, healed antral ulcer.     FOOT SURGERY Right     foreign body removed    HYSTERECTOMY ABDOMINAL N/A 5/16/2019    HYSTERECTOMY ABDOMINAL LAPAROSCOPIC ROBOTIC W/BSO, RETROGRADE BLADDER INSTILLATION & CYSTO performed by Derik Mccord DO at [facility] Bilateral     knees    LEEP N/A 12/18/2018    LEEP performed by Derik Mccord DO at [facility] DX W/CECILIA [place]akat 1979 PFRMD N/A 9/21/2018    COLONOSCOPY performed by Mary Nicholson MD at [address] [place] [address] UPPER GASTROINTESTINAL ENDOSCOPY  9/11/15    BARRETTS       FAMILY HISTORY       Family History   Problem Relation Age of Onset    Cancer Mother         gallbladder, liver       SOCIAL HISTORY       Social History     Socioeconomic History    Marital status:      Spouse name: None    Number of children: None    Years of education: None    Highest education level: None   Occupational History    None   Social Needs    Financial resource strain: None    Food insecurity:     Worry: None     Inability: None    Transportation needs:     Medical: None     Non-medical: None   Tobacco Use    Smoking status: Former Smoker     Last attempt to quit: 2011     Years since quittin.7    Smokeless tobacco: Never Used   Substance and Sexual Activity    Alcohol use: No    Drug use: No    Sexual activity: Not Currently   Lifestyle    Physical activity:     Days per week: None     Minutes per session: None    Stress: None   Relationships    Social connections:     Talks on phone: None     Gets together: None     Attends Qu[employer] service: None     Active member of club or organization: None     Attends meetings of clubs or organizations: None     Relationship status: None    Intimate partner violence:     Fear of current or ex partner: None     Emotionally abused: None     Physically abused: None     Forced sexual activity: None   Other Topics Concern    None   Social History Narrative    None           REVIEW OF SYSTEMS      Allergies   Allergen Reactions    Bee Venom     Codeine     Seasonal        No current facility-administered medications on file prior to encounter.       Current Outpatient Medications on File Prior to Encounter   Medication Sig Dispense Refill    clobetasol (TEMOVATE) 0.05 % cream APPLY EXTERNALLY TO THE AFFECTED AREA TWICE DAILY 15 g 0    ferrous sulfate (FE TABS) 325 (65 Fe) MG EC tablet Take 1 tablet by mouth 2 times daily 90 tablet 3    levothyroxine (SYNTHROID) 75 MCG tablet Take 75 mcg by mouth Daily      sennosides-docusate sodium (SENOKOT-S) 8.6-50 MG tablet Take 1 tablet by mouth daily 30 tablet 0    amLODIPine (NORVASC) 5 MG tablet Take 5 mg by mouth daily      simvastatin (ZOCOR) 40 MG tablet simvastatin 40 mg tablet   Take 0.5 tablets every day by oral route.  meloxicam (MOBIC) 15 MG tablet TK 1 T PO QD  0    PROAIR  (90 BASE) MCG/ACT inhaler       NEXIUM 40 MG delayed release capsule       HYDROcodone-acetaminophen (NORCO) 5-325 MG per tablet TK 1 T PO TID PRN MUST LAST 30 DAYS  0    metFORMIN (GLUCOPHAGE) 1000 MG tablet Take 1,000 mg by mouth 2 times daily (with meals)       CALCIUM-VITAMIN D PO Take 1 tablet by mouth daily 1200mg/1000mg      aspirin 81 MG tablet Take 81 mg by mouth daily.  lisinopril-hydrochlorothiazide (PRINZIDE;ZESTORETIC) 20-12.5 MG per tablet Take 1 tablet by mouth daily.  Multiple Vitamins-Minerals (THERAPEUTIC MULTIVITAMIN-MINERALS) tablet Take 1 tablet by mouth daily.  ibuprofen (ADVIL;MOTRIN) 600 MG tablet Take 1 tablet by mouth every 8 hours as needed for Pain 120 tablet 0    ondansetron (ZOFRAN-ODT) 4 MG disintegrating tablet Take 1 tablet by mouth every 8 hours as needed for Nausea or Vomiting 10 tablet 0    EPIPEN 2-ELISEO 0.3 MG/0.3ML SOAJ injection       ACCU-CHEK ANNABEL PLUS strip       Accu-Chek Multiclix Lancets MISC          Negative except for what is mentioned in the HPI. GENERAL PHYSICAL EXAM     Vitals: /61   Pulse 86   Temp 97.5 °F (36.4 °C) (Oral)   Resp 16   Ht 5' 2.5\" (1.588 m)   Wt 240 lb (108.9 kg)   SpO2 100%   BMI 43.20 kg/m²  Body mass index is 43.2 kg/m². GENERAL APPEARANCE:   Carly Watkins is 78 y.o.,  female, severely obese, nourished, conscious, alert. Does not appear to be distress or pain at this time. SKIN:  Warm, dry, no cyanosis or jaundice. HEAD:  Normocephalic, atraumatic, no swelling or tenderness.

## 2019-09-16 NOTE — ANESTHESIA PRE PROCEDURE
Department of Anesthesiology  Preprocedure Note       Name:  Baljit Petit   Age:  78 y.o.  :  1950                                          MRN:  [id number]         Date:  2019      Surgeon: Severiano Limes):  Bushra Steel MD    Procedure: EGD ESOPHAGOGASTRODUODENOSCOPY (N/A Esophagus)    Medications prior to admission:   Prior to Admission medications    Medication Sig Start Date End Date Taking? Authorizing Provider   clobetasol (TEMOVATE) 0.05 % cream APPLY EXTERNALLY TO THE AFFECTED AREA TWICE DAILY 19  Yes Francois Medel,    clobetasol (TEMOVATE) 0.05 % cream APPLY EXTERNALLY TO THE AFFECTED AREA TWICE DAILY 19  Yes Claudia Curtis APRN - CNP   ferrous sulfate (FE TABS) 325 (65 Fe) MG EC tablet Take 1 tablet by mouth 2 times daily 19  Yes Felipa Gibbs,    levothyroxine (SYNTHROID) 75 MCG tablet Take 75 mcg by mouth Daily   Yes Historical Provider, MD   sennosides-docusate sodium (SENOKOT-S) 8.6-50 MG tablet Take 1 tablet by mouth daily 19  Yes Felipa Gibbs,    amLODIPine (NORVASC) 5 MG tablet Take 5 mg by mouth daily   Yes Historical Provider, MD   simvastatin (ZOCOR) 40 MG tablet simvastatin 40 mg tablet   Take 0.5 tablets every day by oral route. 18  Yes Historical Provider, MD   meloxicam (MOBIC) 15 MG tablet TK 1 T PO QD 18  Yes Historical Provider, MD   PROAIR  (90 BASE) MCG/ACT inhaler  17  Yes Historical Provider, MD   [address] 40 MG delayed release capsule  17  Yes Historical Provider, MD   HYDROcodone-acetaminophen ([place]) 5-325 MG per tablet TK 1 T PO TID PRN MUST LAST 30 DAYS 17  Yes Historical Provider, MD   metFORMIN (GLUCOPHAGE) 1000 MG tablet Take 1,000 mg by mouth 2 times daily (with meals)  17  Yes Historical Provider, MD   CALCIUM-VITAMIN D PO Take 1 tablet by mouth daily 1200mg/1000mg   Yes Historical Provider, MD   aspirin 81 MG tablet Take 81 mg by mouth daily.    Yes Historical Provider, MD

## 2019-09-17 LAB — SURGICAL PATHOLOGY REPORT: NORMAL

## 2019-10-01 DIAGNOSIS — N89.8 VAGINAL ITCHING: ICD-10-CM

## 2019-10-01 DIAGNOSIS — N90.4 LICHEN SCLEROSUS OF FEMALE GENITALIA: ICD-10-CM

## 2019-10-01 DIAGNOSIS — N90.89 VULVAR LESION: ICD-10-CM

## 2019-10-01 RX ORDER — CLOBETASOL PROPIONATE 0.5 MG/G
CREAM TOPICAL
Qty: 15 G | Refills: 0 | Status: SHIPPED | OUTPATIENT
Start: 2019-10-01 | End: 2019-10-30 | Stop reason: SDUPTHER

## 2019-10-30 DIAGNOSIS — N89.8 VAGINAL ITCHING: ICD-10-CM

## 2019-10-30 DIAGNOSIS — N90.4 LICHEN SCLEROSUS OF FEMALE GENITALIA: ICD-10-CM

## 2019-10-30 DIAGNOSIS — N90.89 VULVAR LESION: ICD-10-CM

## 2019-10-31 RX ORDER — CLOBETASOL PROPIONATE 0.5 MG/G
CREAM TOPICAL
Qty: 15 G | Refills: 0 | Status: SHIPPED | OUTPATIENT
Start: 2019-10-31 | End: 2019-11-30 | Stop reason: SDUPTHER

## 2019-11-30 DIAGNOSIS — N90.89 VULVAR LESION: ICD-10-CM

## 2019-11-30 DIAGNOSIS — N90.4 LICHEN SCLEROSUS OF FEMALE GENITALIA: ICD-10-CM

## 2019-11-30 DIAGNOSIS — N89.8 VAGINAL ITCHING: ICD-10-CM

## 2019-12-02 RX ORDER — CLOBETASOL PROPIONATE 0.5 MG/G
CREAM TOPICAL
Qty: 15 G | Refills: 0 | Status: SHIPPED | OUTPATIENT
Start: 2019-12-02 | End: 2020-05-18 | Stop reason: SDUPTHER

## 2020-05-19 RX ORDER — CLOBETASOL PROPIONATE 0.5 MG/G
CREAM TOPICAL
Qty: 15 G | Refills: 0 | Status: SHIPPED | OUTPATIENT
Start: 2020-05-19 | End: 2020-06-04

## 2020-06-04 RX ORDER — CLOBETASOL PROPIONATE 0.5 MG/G
CREAM TOPICAL
Qty: 15 G | Refills: 0 | Status: SHIPPED | OUTPATIENT
Start: 2020-06-04 | End: 2020-06-19

## 2020-06-19 RX ORDER — CLOBETASOL PROPIONATE 0.5 MG/G
CREAM TOPICAL
Qty: 15 G | Refills: 0 | Status: SHIPPED | OUTPATIENT
Start: 2020-06-19 | End: 2020-07-01

## 2020-07-01 RX ORDER — CLOBETASOL PROPIONATE 0.5 MG/G
CREAM TOPICAL
Qty: 15 G | Refills: 0 | Status: SHIPPED | OUTPATIENT
Start: 2020-07-01 | End: 2020-07-13

## 2020-07-13 RX ORDER — CLOBETASOL PROPIONATE 0.5 MG/G
CREAM TOPICAL
Qty: 15 G | Refills: 0 | Status: SHIPPED | OUTPATIENT
Start: 2020-07-13 | End: 2020-07-23

## 2020-07-23 RX ORDER — CLOBETASOL PROPIONATE 0.5 MG/G
CREAM TOPICAL
Qty: 15 G | Refills: 0 | Status: SHIPPED | OUTPATIENT
Start: 2020-07-23 | End: 2020-08-05 | Stop reason: SDUPTHER

## 2020-07-27 ENCOUNTER — OFFICE VISIT (OUTPATIENT)
Dept: OBGYN CLINIC | Age: 70
End: 2020-07-27
Payer: MEDICARE

## 2020-07-27 VITALS
WEIGHT: 244 LBS | TEMPERATURE: 98.4 F | BODY MASS INDEX: 43.92 KG/M2 | DIASTOLIC BLOOD PRESSURE: 60 MMHG | SYSTOLIC BLOOD PRESSURE: 128 MMHG

## 2020-07-27 PROCEDURE — 99397 PER PM REEVAL EST PAT 65+ YR: CPT | Performed by: OBSTETRICS & GYNECOLOGY

## 2020-07-27 RX ORDER — ESTRADIOL 0.1 MG/G
1 CREAM VAGINAL
Qty: 1 TUBE | Refills: 1 | Status: SHIPPED | OUTPATIENT
Start: 2020-07-27 | End: 2022-02-04

## 2020-07-27 NOTE — PROGRESS NOTES
History and Physical    Frida Guaman  2020              69 y.o. Chief Complaint   Patient presents with    Gynecologic Exam       No LMP recorded. Patient has had a hysterectomy. Primary Care Physician: Amna Omer MD    The patient was seen and examined. She has no chief complaint today and is here for her annual exam.  Her bowels are regular. There are no voiding complaints. She denies any bloating. She denies vaginal discharge and was counseled on STD's and the need for barrier contraception. HPI : Frida Guaman is a 69 y.o. female [id number]    Doing well, complaining of vaginal itching and irritation. She does have a history of known vaginal atrophy. Preventative care orders discussed and ordered where appropriate    She has been using temovate cream for irritation, but states she still has vaginal discomfort. Discussed other options and she is open to estrogen therapy    History of hysterectomy    History of LEEP   ________________________________________________________________________  OB History    Para Term  AB Living   3 3 3 0 0 3   SAB TAB Ectopic Molar Multiple Live Births   0 0 0 0 0 3      # Outcome Date GA Lbr Sabino/2nd Weight Sex Delivery Anes PTL Lv   3 Term      Vag-Spont  N SHYAM   2 Term      Vag-Spont  N HSYAM   1 Term      Vag-Spont  N SHYAM     Past Medical History:   Diagnosis Date    Abnormal Pap smear of cervix     Anemia     Small's esophagus     Chronic back pain     Chronic sinusitis     Diabetes mellitus (HCC)     Environmental allergies     GERD (gastroesophageal reflux disease)     Hyperlipidemia     Hypertension     OA (osteoarthritis of spine)     generalized.     PONV (postoperative nausea and vomiting)     Thyroid disease     Wears glasses                                                                    Past Surgical History:   Procedure Laterality Date    APPENDECTOMY      CHOLECYSTECTOMY      COLONOSCOPY  COLPOSCOPY  06/15/2017    dr bella- has had previous colp before    ENDOSCOPY, COLON, DIAGNOSTIC      EGD    ENDOSCOPY, COLON, DIAGNOSTIC  14    barretts esoph, milliary lesions duodenal bulb, hiatal hernia, healed antral ulcer.     FOOT SURGERY Right     foreign body removed    HYSTERECTOMY ABDOMINAL N/A 2019    HYSTERECTOMY ABDOMINAL LAPAROSCOPIC ROBOTIC W/BSO, RETROGRADE BLADDER INSTILLATION & CYSTO performed by Anju Sarabia DO at [facility] Bilateral     knees    LEEP N/A 2018    LEEP performed by Anju Sarabia DO at [address] [place] [address] FLX DX W/COLLJ [address] PFRMD N/A 2018    COLONOSCOPY performed by Stephy Trevino MD at [facility] ENDOSCOPY  9/11/15    BARRETTS    UPPER GASTROINTESTINAL ENDOSCOPY N/A 2019    EGD BIOPSY performed by Stephy Trevino MD at [facility]     Family History   Problem Relation Age of Onset    Cancer Mother         gallbladder, liver     Social History     Socioeconomic History    Marital status:      Spouse name: Not on file    Number of children: Not on file    Years of education: Not on file    Highest education level: Not on file   Occupational History    Not on file   Social Needs    Financial resource strain: Not on file    Food insecurity     Worry: Not on file     Inability: Not on file    Transportation needs     Medical: Not on file     Non-medical: Not on file   Tobacco Use    Smoking status: Former Smoker     Last attempt to quit: 2011     Years since quittin.5    Smokeless tobacco: Never Used   Substance and Sexual Activity    Alcohol use: No    Drug use: No    Sexual activity: Not Currently   Lifestyle    Physical activity     Days per week: Not on file     Minutes per session: Not on file    Stress: Not on file   Relationships    Social connections     Talks on phone: Not on file     Gets together: Not on file     Attends Jain service: Not on file     Active member of club or organization: Not on file     Attends meetings of clubs or organizations: Not on file     Relationship status: Not on file    Intimate partner violence     Fear of current or ex partner: Not on file     Emotionally abused: Not on file     Physically abused: Not on file     Forced sexual activity: Not on file   Other Topics Concern    Not on file   Social History Narrative    Not on file       MEDICATIONS:  Current Outpatient Medications   Medication Sig Dispense Refill    estradiol (ESTRACE VAGINAL) 0.1 MG/GM vaginal cream Place 1 g vaginally every 72 hours 1 Tube 1    clobetasol (TEMOVATE) 0.05 % cream APPLY EXTERNALLY TO THE AFFECTED AREA TWICE DAILY 15 g 0    ferrous sulfate (FE TABS) 325 (65 Fe) MG EC tablet Take 1 tablet by mouth 2 times daily 90 tablet 3    levothyroxine (SYNTHROID) 75 MCG tablet Take 75 mcg by mouth Daily      ibuprofen (ADVIL;MOTRIN) 600 MG tablet Take 1 tablet by mouth every 8 hours as needed for Pain 120 tablet 0    sennosides-docusate sodium (SENOKOT-S) 8.6-50 MG tablet Take 1 tablet by mouth daily 30 tablet 0    ondansetron (ZOFRAN-ODT) 4 MG disintegrating tablet Take 1 tablet by mouth every 8 hours as needed for Nausea or Vomiting 10 tablet 0    amLODIPine (NORVASC) 5 MG tablet Take 5 mg by mouth daily      simvastatin (ZOCOR) 40 MG tablet simvastatin 40 mg tablet   Take 0.5 tablets every day by oral route.       meloxicam (MOBIC) 15 MG tablet TK 1 T PO QD  0    PROAIR  (90 BASE) MCG/ACT inhaler       EPIPEN 2-ELISEO 0.3 MG/0.3ML SOAJ injection       NEXIUM 40 MG delayed release capsule       ACCU-CHEK ANNABEL PLUS strip       Accu-Chek Multiclix Lancets MISC       HYDROcodone-acetaminophen (NORCO) 5-325 MG per tablet TK 1 T PO TID PRN MUST LAST 30 DAYS  0    metFORMIN (GLUCOPHAGE) 1000 MG tablet Take 1,000 mg by mouth 2 times daily (with meals)       CALCIUM-VITAMIN D PO Take 1 tablet by mouth daily 1200mg/1000mg      aspirin 81 MG tablet Take 81 mg by mouth daily.  lisinopril-hydrochlorothiazide (PRINZIDE;ZESTORETIC) 20-12.5 MG per tablet Take 1 tablet by mouth daily.  Multiple Vitamins-Minerals (THERAPEUTIC MULTIVITAMIN-MINERALS) tablet Take 1 tablet by mouth daily. No current facility-administered medications for this visit. ALLERGIES:  Allergies as of 07/27/2020 - Review Complete 07/27/2020   Allergen Reaction Noted    Bee venom  08/22/2014    Codeine  03/22/2018    Seasonal  06/15/2017       Symptoms of decreased mood absent    Immunization status: up to date and documented. Gynecologic History:     No LMP recorded. Patient has had a hysterectomy. Sexually Active: Yes    STD History: Yes +HPV    Permanent Sterilization: Yes    Reversible Birth Control: No        Hormone Replacement Exposure: No      Genetic Qualified Family History of Breast, Ovarian , Colon or Uterine Cancer: No     If YES see scanned worksheet. Preventative Health Testing:    Health Maintenance:  Health Maintenance Due   Topic Date Due    Hepatitis C screen  1950    Lipid screen  04/11/1959    DTaP/Tdap/Td vaccine (1 - Tdap) 04/11/1968    Breast cancer screen  04/11/1999    Shingles Vaccine (1 of 2) 04/11/1999    DEXA (modify frequency per FRAX score)  04/11/2004    Annual Wellness Visit (AWV)  06/23/2019    A1C test (Diabetic or Prediabetic)  05/02/2020    TSH testing  05/02/2020    Potassium monitoring  05/17/2020    Creatinine monitoring  05/17/2020       Date of Last Pap Smear: 7/2018  Abnormal Pap Smear History: yes, 2017  Colposcopy History:   Date of Last Mammogram: unknkown, scheduled for tomorrow  Date of Last Colonoscopy:   Date of Last Bone Density:      ________________________________________________________________________        REVIEW OF SYSTEMS:       A minimum of an eleven point review of systems was completed.     Review Of Systems (11 point):  Constitutional: No fever, chills or malaise; No weight change or fatigue  Head and Eyes: No vision, Headache, Dizziness or trauma in last 12 months  ENT ROS: No hearing, Tinnitis, sinus or taste problems  Hematological and Lymphatic ROS:No Lymphoma, Von Willebrand's, Hemophillia or Bleeding History  Psych ROS: No Depression, Homicidal thoughts,suicidal thoughts, or anxiety  Breast ROS: No prior breast abnormalities or lumps  Respiratory ROS: No SOB, Pneumoniae,Cough, or Pulmonary Embolism History  Cardiovascular ROS: No Chest Pain with Exertion, Palpitations, Syncope, Edema, Arrhythmia  Gastrointestinal ROS: No Indigestion, Heartburn, Nausea, vomiting, Diarrhea, Constipation,or Bowel Changes; No Bloody Stools or melena  Genito-Urinary ROS: No Dysuria, Hematuria or Nocturia. No Urinary Incontinence or Vaginal Discharge  Musculoskeletal ROS: No Arthralgia, Arthritis,Gout,Osteoporosis or Rheumatism  Neurological ROS: No CVA, Migraines, Epilepsy, Seizure Hx, or Limb Weakness  Dermatological ROS: No Rash, Itching, Hives, Mole Changes or Cancer                                                                                                                                                                                                                                  PHYSICAL Exam:     Constitutional:  Vitals:    07/27/20 0806   BP: 128/60   Site: Left Upper Arm   Position: Sitting   Cuff Size: Large Adult   Temp: 98.4 °F (36.9 °C)   Weight: 244 lb (110.7 kg)         General Appearance: This  is a well Developed, well Nourished, well groomed female. Her BMI was reviewed. Nutritional decision making was discussed. Skin:  There was a Normal Inspection of the skin without rashes or lesions. There were no rashes. Lymphatic:  No Lymph Nodes were Palpable in the neck , axilla or groin.  0 # Of Lymph Nodes     Neck and EENT:  The neck was supple. There were no masses   The thyroid was not enlarged and had no masses.   EOMI B/L    Respiratory: The lungs were auscultated and found to be clear. There were no rales, rhonchi or wheezes. There was a good respiratory effort. Cardiovascular: The heart was in a regular rate and rhythm. . No S3 or S4. There was no murmur appreciated. Location, grade, and radiation are not applicable. Extremities: The patients extremities were without calf tenderness, edema, or varicosities. There was full range of motion in all four extremities. Pulses in all four extremities were appreciated and are 2/4. Abdomen: The abdomen was soft and non-tender. There were good bowel sounds in all quadrants and there was no guarding, rebound or rigidity. Abdominal Scars: well healed    Psych: The patient had a normal Orientation to: Time, Place, Person, and Situation  There is no Mood / Affect changes    Breast:  (Chest)  normal appearance, no masses or tenderness  Self breast exams were reviewed in detail. Literature was given. Pelvic Exam:  Vulva and vagina appear normal. +atrophy of vaginal tissue and irritation of labia. Surgically absent uterus, no adnexal pain    Rectal Exam:  exam declined by patient          Musculosk:  Normal Gait and station was noted. Digits were evaluated without abnormal findings. Range of motion, stability and strength were evaluated and found to be appropriate for the patients age. OMM Structural Component:  The patient did not complain of a Chief complaint requiring OMM. Chief Complaint:none    Structural Exam: No Interest    ASSESSMENT:      69 y.o. Annual   Diagnosis Orders   1. Encounter for annual routine gynecological examination  PAP SMEAR   2. Screening for osteoporosis  DEXA BONE DENSITY AXIAL SKELETON   3. Vaginal atrophy  estradiol (ESTRACE VAGINAL) 0.1 MG/GM vaginal cream   4. S/P hysterectomy  estradiol (ESTRACE VAGINAL) 0.1 MG/GM vaginal cream   5.  Vaginal irritation  estradiol (ESTRACE VAGINAL) 0.1 MG/GM vaginal cream          Chief Complaint Answer: Thin Prep     Order Specific Question:   Prior Abnormal Pap Test     Answer:   No     Order Specific Question:   Screening or Diagnostic     Answer:   Screening     Order Specific Question:   HPV Requested?      Answer:   Yes     Order Specific Question:   High Risk Patient     Answer:   N/A

## 2020-08-05 RX ORDER — CLOBETASOL PROPIONATE 0.5 MG/G
CREAM TOPICAL
Qty: 15 G | Refills: 0 | OUTPATIENT
Start: 2020-08-05

## 2020-08-05 NOTE — TELEPHONE ENCOUNTER
Pt is using her temovate cream twice a day about 1/2 of cream she says she squeezes out-  she is going through the cream every 8-10 days- stated typically 1 tube can last a month and we say a pea size amount to the effected areas so you do not need to make so many trips to the pharmacy- stated I will double check with Dr bella if this would be normal going through the tube every 8-10 days- pt will need a rx sent to pharmacy as she is out

## 2020-08-06 RX ORDER — CLOBETASOL PROPIONATE 0.5 MG/G
CREAM TOPICAL
Qty: 15 G | Refills: 5 | Status: SHIPPED | OUTPATIENT
Start: 2020-08-06 | End: 2021-02-18

## 2021-02-18 DIAGNOSIS — N90.89 VULVAR LESION: ICD-10-CM

## 2021-02-18 DIAGNOSIS — N89.8 VAGINAL ITCHING: ICD-10-CM

## 2021-02-18 DIAGNOSIS — N90.4 LICHEN SCLEROSUS OF FEMALE GENITALIA: ICD-10-CM

## 2021-02-18 RX ORDER — CLOBETASOL PROPIONATE 0.5 MG/G
CREAM TOPICAL
Qty: 15 G | Refills: 5 | Status: SHIPPED | OUTPATIENT
Start: 2021-02-18 | End: 2021-05-19

## 2021-05-19 DIAGNOSIS — N90.4 LICHEN SCLEROSUS OF FEMALE GENITALIA: ICD-10-CM

## 2021-05-19 DIAGNOSIS — N89.8 VAGINAL ITCHING: ICD-10-CM

## 2021-05-19 DIAGNOSIS — N90.89 VULVAR LESION: ICD-10-CM

## 2021-05-19 RX ORDER — CLOBETASOL PROPIONATE 0.5 MG/G
CREAM TOPICAL
Qty: 15 G | Refills: 5 | Status: SHIPPED | OUTPATIENT
Start: 2021-05-19 | End: 2021-10-11

## 2021-08-02 ENCOUNTER — OFFICE VISIT (OUTPATIENT)
Dept: OBGYN CLINIC | Age: 71
End: 2021-08-02
Payer: MEDICARE

## 2021-08-02 ENCOUNTER — HOSPITAL ENCOUNTER (OUTPATIENT)
Age: 71
Setting detail: SPECIMEN
Discharge: HOME | End: 2021-08-02
Payer: MEDICARE

## 2021-08-02 VITALS
WEIGHT: 234.38 LBS | SYSTOLIC BLOOD PRESSURE: 122 MMHG | DIASTOLIC BLOOD PRESSURE: 70 MMHG | RESPIRATION RATE: 16 BRPM | BODY MASS INDEX: 42.18 KG/M2

## 2021-08-02 DIAGNOSIS — Z12.31 SCREENING MAMMOGRAM, ENCOUNTER FOR: ICD-10-CM

## 2021-08-02 DIAGNOSIS — R87.611 PAP SMEAR OF CERVIX WITH ASCUS, CANNOT EXCLUDE HGSIL: Primary | ICD-10-CM

## 2021-08-02 PROCEDURE — 99397 PER PM REEVAL EST PAT 65+ YR: CPT | Performed by: OBSTETRICS & GYNECOLOGY

## 2021-08-02 NOTE — PROGRESS NOTES
History and Physical    Miguel Lin  2021              66 y.o. Chief Complaint   Patient presents with    Gynecologic Exam       No LMP recorded. Patient has had a hysterectomy. Primary Care Physician: Harriet Brooks MD    The patient was seen and examined. She has no chief complaint today and is here for her annual exam.  Her bowels are regular. There are no voiding complaints. She denies any bloating. She denies vaginal discharge and was counseled on STD's and the need for barrier contraception. HPI : Miguel Lin is a 66 y.o. female [id number]    Doing well, complaining of vaginal itching and irritation. She does have a history of known vaginal atrophy - currently using estrogen vaginal cream    Preventative care orders discussed and ordered where appropriate    History of hysterectomy 2019   History of LEEP   ________________________________________________________________________  OB History    Para Term  AB Living   3 3 3 0 0 3   SAB TAB Ectopic Molar Multiple Live Births   0 0 0 0 0 3      # Outcome Date GA Lbr Sabino/2nd Weight Sex Delivery Anes PTL Lv   3 Term      Vag-Spont  N SHYAM   2 Term      Vag-Spont  N SHYAM   1 Term      Vag-Spont  N SHYAM     Past Medical History:   Diagnosis Date    Abnormal Pap smear of cervix     Anemia     Small's esophagus     Chronic back pain     Chronic sinusitis     Diabetes mellitus (HCC)     Environmental allergies     GERD (gastroesophageal reflux disease)     Hyperlipidemia     Hypertension     OA (osteoarthritis of spine)     generalized.     PONV (postoperative nausea and vomiting)     Thyroid disease     Wears glasses                                                                    Past Surgical History:   Procedure Laterality Date    APPENDECTOMY      CHOLECYSTECTOMY      COLONOSCOPY      COLPOSCOPY  06/15/2017    dr bella- has had previous colp before    ENDOSCOPY, COLON, DIAGNOSTIC      EGD    ENDOSCOPY, COLON, DIAGNOSTIC  09-05-14    barretts esoph, milliary lesions duodenal bulb, hiatal hernia, healed antral ulcer.  FOOT SURGERY Right     foreign body removed    HYSTERECTOMY ABDOMINAL N/A 5/16/2019    HYSTERECTOMY ABDOMINAL LAPAROSCOPIC ROBOTIC W/BSO, RETROGRADE BLADDER INSTILLATION & CYSTO performed by Mark Stratton DO at [facility] Bilateral     knees    LEEP N/A 12/18/2018    LEEP performed by Mark Stratton DO at [address] [place] [address] FLX DX W/COLLJ [address] PFRMD N/A 9/21/2018    COLONOSCOPY performed by Tian Milian MD at [address] [place] [address] ENDOSCOPY  9/11/15    BARRETTS    UPPER GASTROINTESTINAL ENDOSCOPY N/A 9/16/2019    EGD BIOPSY performed by Tian Milian MD at [facility]     Family History   Problem Relation Age of Onset    Cancer Mother         gallbladder, liver     Social History     Socioeconomic History    Marital status:      Spouse name: Not on file    Number of children: Not on file    Years of education: Not on file    Highest education level: Not on file   Occupational History    Not on file   Tobacco Use    Smoking status: Former Smoker     Quit date: 1/4/2011     Years since quitting: 10.5    Smokeless tobacco: Never Used   Vaping Use    Vaping Use: Never used   Substance and Sexual Activity    Alcohol use: No    Drug use: No    Sexual activity: Not Currently   Other Topics Concern    Not on file   Social History Narrative    Not on file     Social Determinants of Health     Financial Resource Strain:     Difficulty of Paying Living Expenses:    Food Insecurity:     Worried About Running Out of Food in the Last Year:     [address] in the Last Year:    Transportation Needs:     Lack of Transportation (Medical):      Lack of Transportation (Non-Medical):    Physical Activity:     Days of Exercise per Week:     Minutes of Exercise per Session:    Stress:     Feeling of Stress :    Social Connections:     Frequency of Communication with Friends and Family:     Frequency of Social Gatherings with Friends and Family:     Attends Orthodoxy Services:     Active Member of Clubs or Organizations:     Attends Club or Organization Meetings:     Marital Status:    Intimate Partner Violence:     Fear of Current or Ex-Partner:     Emotionally Abused:     Physically Abused:     Sexually Abused:        MEDICATIONS:  Current Outpatient Medications   Medication Sig Dispense Refill    clobetasol (TEMOVATE) 0.05 % cream APPLY EXTERNALLY TO THE AFFECTED AREA TWICE DAILY 15 g 5    estradiol (ESTRACE VAGINAL) 0.1 MG/GM vaginal cream Place 1 g vaginally every 72 hours 1 Tube 1    ferrous sulfate (FE TABS) 325 (65 Fe) MG EC tablet Take 1 tablet by mouth 2 times daily 90 tablet 3    levothyroxine (SYNTHROID) 75 MCG tablet Take 75 mcg by mouth Daily      ibuprofen (ADVIL;MOTRIN) 600 MG tablet Take 1 tablet by mouth every 8 hours as needed for Pain 120 tablet 0    sennosides-docusate sodium (SENOKOT-S) 8.6-50 MG tablet Take 1 tablet by mouth daily 30 tablet 0    ondansetron (ZOFRAN-ODT) 4 MG disintegrating tablet Take 1 tablet by mouth every 8 hours as needed for Nausea or Vomiting 10 tablet 0    amLODIPine (NORVASC) 5 MG tablet Take 5 mg by mouth daily      simvastatin (ZOCOR) 40 MG tablet simvastatin 40 mg tablet   Take 0.5 tablets every day by oral route.       meloxicam (MOBIC) 15 MG tablet TK 1 T PO QD  0    PROAIR  (90 BASE) MCG/ACT inhaler       EPIPEN 2-ELISEO 0.3 MG/0.3ML SOAJ injection       NEXIUM 40 MG delayed release capsule       ACCU-CHEK ANNABEL PLUS strip       Accu-Chek Multiclix Lancets MISC       HYDROcodone-acetaminophen (NORCO) 5-325 MG per tablet TK 1 T PO TID PRN MUST LAST 30 DAYS  0    metFORMIN (GLUCOPHAGE) 1000 MG tablet Take 1,000 mg by mouth 2 times daily (with meals)       CALCIUM-VITAMIN D PO Take 1 tablet by mouth daily 1200mg/1000mg      aspirin 81 MG tablet Take 81 mg by mouth daily.  lisinopril-hydrochlorothiazide (PRINZIDE;ZESTORETIC) 20-12.5 MG per tablet Take 1 tablet by mouth daily.  Multiple Vitamins-Minerals (THERAPEUTIC MULTIVITAMIN-MINERALS) tablet Take 1 tablet by mouth daily. No current facility-administered medications for this visit. ALLERGIES:  Allergies as of 08/02/2021 - Review Complete 07/27/2020   Allergen Reaction Noted    Bee venom  08/22/2014    Codeine  03/22/2018    Seasonal  06/15/2017       Symptoms of decreased mood absent    Immunization status: up to date and documented. Gynecologic History:     No LMP recorded. Patient has had a hysterectomy. Sexually Active: Yes    STD History: Yes +HPV    Permanent Sterilization: Yes    Reversible Birth Control: No        Hormone Replacement Exposure: No      Genetic Qualified Family History of Breast, Ovarian , Colon or Uterine Cancer: No     If YES see scanned worksheet. Preventative Health Testing:    Health Maintenance:  Health Maintenance Due   Topic Date Due    Hepatitis C screen  Never done    Lipid screen  Never done    Breast cancer screen  Never done    Shingles Vaccine (1 of 2) Never done    DEXA (modify frequency per FRAX score)  Never done    DTaP/Tdap/Td vaccine (1 - Tdap) 01/02/2007    Annual Wellness Visit (AWV)  Never done    A1C test (Diabetic or Prediabetic)  05/02/2020    TSH testing  05/02/2020    Potassium monitoring  05/17/2020    Creatinine monitoring  05/17/2020       Date of Last Pap Smear: 7/2018  Abnormal Pap Smear History: yes, 2017  Colposcopy History:   Date of Last Mammogram: unknkown, scheduled for tomorrow  Date of Last Colonoscopy:   Date of Last Bone Density:      ________________________________________________________________________        REVIEW OF SYSTEMS:       A minimum of an eleven point review of systems was completed. Review Of Systems (11 point):  Constitutional: No fever, chills or malaise;  No weight change or fatigue  Head and Eyes: No vision, Headache, Dizziness or trauma in last 12 months  ENT ROS: No hearing, Tinnitis, sinus or taste problems  Hematological and Lymphatic ROS:No Lymphoma, Von Willebrand's, Hemophillia or Bleeding History  Psych ROS: No Depression, Homicidal thoughts,suicidal thoughts, or anxiety  Breast ROS: No prior breast abnormalities or lumps  Respiratory ROS: No SOB, Pneumoniae,Cough, or Pulmonary Embolism History  Cardiovascular ROS: No Chest Pain with Exertion, Palpitations, Syncope, Edema, Arrhythmia  Gastrointestinal ROS: No Indigestion, Heartburn, Nausea, vomiting, Diarrhea, Constipation,or Bowel Changes; No Bloody Stools or melena  Genito-Urinary ROS: No Dysuria, Hematuria or Nocturia. No Urinary Incontinence or Vaginal Discharge  Musculoskeletal ROS: No Arthralgia, Arthritis,Gout,Osteoporosis or Rheumatism  Neurological ROS: No CVA, Migraines, Epilepsy, Seizure Hx, or Limb Weakness  Dermatological ROS: No Rash, Itching, Hives, Mole Changes or Cancer                                                                                                                                                                                                                                  PHYSICAL Exam:     Constitutional:  Vitals:    08/02/21 0818   BP: 122/70   Site: Left Upper Arm   Position: Sitting   Cuff Size: Large Adult   Resp: 16   Weight: 234 lb 6 oz (106.3 kg)         General Appearance: This  is a well Developed, well Nourished, well groomed female. Her BMI was reviewed. Nutritional decision making was discussed. Skin:  There was a Normal Inspection of the skin without rashes or lesions. There were no rashes. Lymphatic:  No Lymph Nodes were Palpable in the neck , axilla or groin.  0 # Of Lymph Nodes     Neck and EENT:  The neck was supple. There were no masses   The thyroid was not enlarged and had no masses. EOMI B/L    Respiratory:   The lungs were auscultated and found to be clear. There were no rales, rhonchi or wheezes. There was a good respiratory effort. Cardiovascular: The heart was in a regular rate and rhythm. . No S3 or S4. There was no murmur appreciated. Location, grade, and radiation are not applicable. Extremities: The patients extremities were without calf tenderness, edema, or varicosities. There was full range of motion in all four extremities. Pulses in all four extremities were appreciated and are 2/4. Abdomen: The abdomen was soft and non-tender. There were good bowel sounds in all quadrants and there was no guarding, rebound or rigidity. Abdominal Scars: well healed    Psych: The patient had a normal Orientation to: Time, Place, Person, and Situation  There is no Mood / Affect changes    Breast:  (Chest)  normal appearance, no masses or tenderness  Self breast exams were reviewed in detail. Literature was given. Pelvic Exam:  Vulva and vagina appear normal. +atrophy of vaginal tissue and irritation of labia. Surgically absent uterus, no adnexal pain    Rectal Exam:  exam declined by patient          Musculosk:  Normal Gait and station was noted. Digits were evaluated without abnormal findings. Range of motion, stability and strength were evaluated and found to be appropriate for the patients age. OMM Structural Component:  The patient did not complain of a Chief complaint requiring OMM. Chief Complaint:none    Structural Exam: No Interest    ASSESSMENT:      66 y.o. Annual   Diagnosis Orders   1. Pap smear of cervix with ASCUS, cannot exclude HGSIL  PAP Smear   2.  Screening mammogram, encounter for  [facility] DIGITAL SCREEN W OR WO CAD BILATERAL          Chief Complaint   Patient presents with    Gynecologic Exam          Past Medical History:   Diagnosis Date    Abnormal Pap smear of cervix     Anemia     Small's esophagus     Chronic back pain     Chronic sinusitis     Diabetes mellitus ([facility] [address]     Environmental allergies     GERD (gastroesophageal reflux disease)     Hyperlipidemia     Hypertension     OA (osteoarthritis of spine)     generalized.  PONV (postoperative nausea and vomiting)     Thyroid disease     Wears glasses          Patient Active Problem List    Diagnosis Date Noted    Hypertension 05/16/2019    Hypothyroidism 05/16/2019    RALH BSO w/ Cystoscopy & Retrograde Bladder Instillation 5/16/19 05/16/2019    Postoperative state 05/16/2019    [place]-Walker grade 2 cystocele     Urinary incontinence     LEEP w/[place] 12/18/18 12/18/2018    Cervical dysplasia 54/03/3535    Lichen sclerosus of female genitalia 07/26/2018    HPV in female 05/22/2017 2017 Normal Pap Smear- + HPV Type 18      Recurrent Dysplasia 05/22/2017     Cervical conization x2            Hereditary Breast, Ovarian, Colon and Uterine Cancer screening Done. Tobacco & Secondary smoke risks reviewed; instructed on cessation and avoidance      Counseling Completed:  Preventative Health Recommendations and Follow up. PLAN:  Return in about 1 year (around 8/2/2022), or if symptoms worsen or fail to improve, for annual.  Repeat Annual every 1 year  Cervical Cytology Evaluation begins at 24years old. If Negative Cytology, Follow-up screening per current guidelines. Mammograms every 1 year. If 37 yo and last mammogram was negative. Calcium and Vitamin D dosing reviewed. Colonoscopy screening reviewed as well as onset for bone density testing. STD counseling and prevention reviewed. Routine health maintenance per patients PCP. Orders Placed This Encounter   Procedures    [place] DIGITAL SCREEN W OR WO CAD BILATERAL     Standing Status:   Future     Standing Expiration Date:   10/2/2022     Order Specific Question:   Reason for exam:     Answer:   annual screening mammogram    PAP Smear     Patient History:    No LMP recorded. Patient has had a hysterectomy.   OBGYN Status: Hysterectomy  Past Surgical History:  No date: APPENDECTOMY  No date: CHOLECYSTECTOMY  No date: COLONOSCOPY  06/15/2017: COLPOSCOPY      Comment:  dr bella- has had previous colp before  No date: ENDOSCOPY, COLON, DIAGNOSTIC      Comment:  EGD  09-05-14: ENDOSCOPY, COLON, DIAGNOSTIC      Comment:  barretts esoph, milliary lesions duodenal bulb, hiatal                hernia, healed antral ulcer. No date: FOOT SURGERY; Right      Comment:  foreign body removed  5/16/2019: HYSTERECTOMY ABDOMINAL; N/A      Comment:  HYSTERECTOMY ABDOMINAL LAPAROSCOPIC ROBOTIC W/BSO,                RETROGRADE BLADDER INSTILLATION & CYSTO performed by Anthony Hernandez DO at [address]  No date: JOINT REPLACEMENT; Bilateral      Comment:  knees  12/18/2018: LEEP; N/A      Comment:  LEEP performed by Mark Stratton DO at [address]  9/21/2018: [place] COLONOSCOPY FLX DX W/COLLJ SPEC WHEN PFRMD; N/A      Comment:  COLONOSCOPY performed by Tian Milian MD at [address]  No date: TUBAL LIGATION  9/11/15: UPPER GASTROINTESTINAL ENDOSCOPY      Comment:  Azell Och  9/16/2019: UPPER GASTROINTESTINAL ENDOSCOPY; N/A      Comment:  EGD BIOPSY performed by Tian Milian MD at [facility] ENDO    Problem List        Edg Problems Affecting Cytology    HPV in female    Overview Signed 6/15/2017  9:29 AM by Victoriano Urias     2017 Normal Pap Smear- + HPV Type 18         Cervical dysplasia    History of cervical dysplasia    Overview Signed 5/22/2017  8:41 AM by Mark Stratton DO     Cervical conization x2         S/P hysterectomy      Social History    Tobacco Use      Smoking status: Former Smoker        Quit date: 1/4/2011        Years since quitting: 10.5      Smokeless tobacco: Never Used       Standing Status:   Future     Standing Expiration Date:   8/2/2022     Order Specific Question:   Collection Type     Answer:    Thin Prep     Order Specific Question:   Prior Abnormal Pap Test     Answer:   No     Order Specific Question:   Screening or Diagnostic     Answer: Diagnostic     Order Specific Question:   HPV Requested?      Answer:   Yes     Order Specific Question:   High Risk Patient     Answer:   N/A

## 2021-08-03 LAB
HPV SAMPLE: NORMAL
HPV, GENOTYPE 16: NOT DETECTED
HPV, GENOTYPE 18: NOT DETECTED
HPV, HIGH RISK OTHER: NOT DETECTED
HPV, INTERPRETATION: NORMAL
SPECIMEN DESCRIPTION: NORMAL

## 2021-08-11 LAB — CYTOLOGY REPORT: NORMAL

## 2021-10-11 DIAGNOSIS — N90.4 LICHEN SCLEROSUS OF FEMALE GENITALIA: ICD-10-CM

## 2021-10-11 DIAGNOSIS — N89.8 VAGINAL ITCHING: ICD-10-CM

## 2021-10-11 DIAGNOSIS — N90.89 VULVAR LESION: ICD-10-CM

## 2021-10-11 RX ORDER — CLOBETASOL PROPIONATE 0.5 MG/G
CREAM TOPICAL
Qty: 15 G | Refills: 5 | Status: SHIPPED | OUTPATIENT
Start: 2021-10-11 | End: 2022-01-18

## 2022-01-18 DIAGNOSIS — N89.8 VAGINAL ITCHING: ICD-10-CM

## 2022-01-18 DIAGNOSIS — N90.89 VULVAR LESION: ICD-10-CM

## 2022-01-18 DIAGNOSIS — N90.4 LICHEN SCLEROSUS OF FEMALE GENITALIA: ICD-10-CM

## 2022-01-18 RX ORDER — CLOBETASOL PROPIONATE 0.5 MG/G
CREAM TOPICAL
Qty: 15 G | Refills: 5 | Status: SHIPPED | OUTPATIENT
Start: 2022-01-18 | End: 2022-05-08

## 2022-01-29 DIAGNOSIS — Z90.710 S/P HYSTERECTOMY: ICD-10-CM

## 2022-01-29 DIAGNOSIS — N95.2 VAGINAL ATROPHY: ICD-10-CM

## 2022-01-29 DIAGNOSIS — N89.8 VAGINAL IRRITATION: ICD-10-CM

## 2022-02-04 RX ORDER — ESTRADIOL 0.1 MG/G
CREAM VAGINAL
Qty: 42.5 G | Refills: 2 | Status: SHIPPED | OUTPATIENT
Start: 2022-02-04 | End: 2022-11-01

## 2022-05-02 DIAGNOSIS — N90.4 LICHEN SCLEROSUS OF FEMALE GENITALIA: ICD-10-CM

## 2022-05-02 DIAGNOSIS — N89.8 VAGINAL ITCHING: ICD-10-CM

## 2022-05-02 DIAGNOSIS — N90.89 VULVAR LESION: ICD-10-CM

## 2022-05-08 RX ORDER — CLOBETASOL PROPIONATE 0.5 MG/G
CREAM TOPICAL
Qty: 15 G | Refills: 5 | Status: SHIPPED | OUTPATIENT
Start: 2022-05-08 | End: 2022-10-06

## 2022-10-02 DIAGNOSIS — N90.4 LICHEN SCLEROSUS OF FEMALE GENITALIA: ICD-10-CM

## 2022-10-02 DIAGNOSIS — N89.8 VAGINAL ITCHING: ICD-10-CM

## 2022-10-02 DIAGNOSIS — N90.89 VULVAR LESION: ICD-10-CM

## 2022-10-06 RX ORDER — CLOBETASOL PROPIONATE 0.5 MG/G
CREAM TOPICAL
Qty: 15 G | Refills: 5 | Status: SHIPPED | OUTPATIENT
Start: 2022-10-06

## 2022-10-10 ENCOUNTER — HOSPITAL ENCOUNTER (OUTPATIENT)
Dept: PREADMISSION TESTING | Age: 72
Discharge: HOME | End: 2022-10-14

## 2022-10-10 VITALS — WEIGHT: 219 LBS | BODY MASS INDEX: 40.3 KG/M2 | HEIGHT: 62 IN

## 2022-10-10 RX ORDER — LISINOPRIL 20 MG/1
20 TABLET ORAL DAILY
COMMUNITY

## 2022-10-10 RX ORDER — METOPROLOL SUCCINATE 50 MG/1
50 TABLET, EXTENDED RELEASE ORAL DAILY
COMMUNITY

## 2022-10-10 NOTE — PROGRESS NOTES
Pre-op Instructions For Out-Patient Endoscopy Surgery    Medication Instructions:  Please stop herbs and any supplements now (includes vitamins and minerals). Please contact your surgeon and prescribing physician for pre-op instructions for any blood thinners. Aspirin and Meloxicam     If you have inhalers/aerosol treatments at home, please use them the morning of your surgery and bring the inhalers with you to the hospital.    Please take the following medications the morning of your surgery with a sip of water:    Inhaler, Lisinopril, Amlodipine, Metoprolol and Levothyroxine. Surgery Instructions:  After midnight before surgery:  Do not eat or drink anything, including water, mints, gum, and hard candy. You may brush your teeth without swallowing. No smoking, chewing tobacco, or street drugs. Please shower or bathe before surgery. Please do not wear any cologne, lotion, powder, jewelry, piercings, perfume, makeup, nail polish, hair accessories, or hair spray on the day of surgery. Wear loose comfortable clothing. Leave your valuables at home. Bring a storage case for any glasses/contacts. An adult who is responsible for you MUST drive you home and should be with you for the first 24 hours after surgery. The Day of Surgery:  Arrive at [facility] Surgery Entrance at the time directed by your surgeon and check in at the desk. If you have a living will or healthcare power of , please bring a copy. You will be taken to the pre-op holding area where you will be prepared for surgery. A physical assessment will be performed by a nurse practitioner or house officer. Your IV will be started and you will meet your anesthesiologist.    When you go to surgery, your family will be directed to the surgical waiting room, where the doctor should speak with them after your surgery.     After surgery, you will be taken to the recovery room then when you are awake and stable you will go to the short stay unit for preparation to be discharged. Instructions read to Colonel Mooney and understanding verbalized.      10/24/22 EGD

## 2022-10-11 RX ORDER — LIDOCAINE HYDROCHLORIDE 20 MG/ML
15 SOLUTION OROPHARYNGEAL ONCE
Status: CANCELLED | OUTPATIENT
Start: 2022-10-24

## 2022-10-21 ENCOUNTER — ANESTHESIA EVENT (OUTPATIENT)
Dept: ENDOSCOPY | Age: 72
End: 2022-10-21
Payer: MEDICARE

## 2022-10-24 ENCOUNTER — HOSPITAL ENCOUNTER (OUTPATIENT)
Age: 72
Setting detail: OUTPATIENT SURGERY
Discharge: HOME | End: 2022-10-24
Attending: SURGERY | Admitting: SURGERY
Payer: MEDICARE

## 2022-10-24 ENCOUNTER — ANESTHESIA (OUTPATIENT)
Dept: ENDOSCOPY | Age: 72
End: 2022-10-24
Payer: MEDICARE

## 2022-10-24 VITALS
DIASTOLIC BLOOD PRESSURE: 66 MMHG | BODY MASS INDEX: 40.3 KG/M2 | HEIGHT: 62 IN | WEIGHT: 219 LBS | HEART RATE: 68 BPM | TEMPERATURE: 97.7 F | SYSTOLIC BLOOD PRESSURE: 101 MMHG | OXYGEN SATURATION: 95 % | RESPIRATION RATE: 19 BRPM

## 2022-10-24 DIAGNOSIS — Z87.19 HISTORY OF BARRETT'S ESOPHAGUS: ICD-10-CM

## 2022-10-24 LAB — GLUCOSE BLD-MCNC: 100 MG/DL (ref 65–105)

## 2022-10-24 PROCEDURE — 7100000001 HC PACU RECOVERY - ADDTL 15 MIN: Performed by: SURGERY

## 2022-10-24 PROCEDURE — 3609012400 HC EGD TRANSORAL BIOPSY SINGLE/MULTIPLE: Performed by: SURGERY

## 2022-10-24 PROCEDURE — 7100000000 HC PACU RECOVERY - FIRST 15 MIN: Performed by: SURGERY

## 2022-10-24 PROCEDURE — 82947 ASSAY GLUCOSE BLOOD QUANT: CPT

## 2022-10-24 PROCEDURE — 88342 IMHCHEM/IMCYTCHM 1ST ANTB: CPT

## 2022-10-24 PROCEDURE — 2709999900 HC NON-CHARGEABLE SUPPLY: Performed by: SURGERY

## 2022-10-24 PROCEDURE — 6370000000 HC RX 637 (ALT 250 FOR IP): Performed by: SURGERY

## 2022-10-24 PROCEDURE — 88305 TISSUE EXAM BY PATHOLOGIST: CPT

## 2022-10-24 PROCEDURE — 6360000002 HC RX W HCPCS: Performed by: NURSE ANESTHETIST, CERTIFIED REGISTERED

## 2022-10-24 PROCEDURE — 3700000000 HC ANESTHESIA ATTENDED CARE: Performed by: SURGERY

## 2022-10-24 PROCEDURE — 3700000001 HC ADD 15 MINUTES (ANESTHESIA): Performed by: SURGERY

## 2022-10-24 PROCEDURE — 2580000003 HC RX 258: Performed by: ANESTHESIOLOGY

## 2022-10-24 PROCEDURE — 2500000003 HC RX 250 WO HCPCS: Performed by: NURSE ANESTHETIST, CERTIFIED REGISTERED

## 2022-10-24 RX ORDER — PROPOFOL 10 MG/ML
INJECTION, EMULSION INTRAVENOUS PRN
Status: DISCONTINUED | OUTPATIENT
Start: 2022-10-24 | End: 2022-10-24 | Stop reason: SDUPTHER

## 2022-10-24 RX ORDER — FENTANYL CITRATE 50 UG/ML
25 INJECTION, SOLUTION INTRAMUSCULAR; INTRAVENOUS EVERY 5 MIN PRN
Status: CANCELLED | OUTPATIENT
Start: 2022-10-24

## 2022-10-24 RX ORDER — SODIUM CHLORIDE 0.9 % (FLUSH) 0.9 %
5-40 SYRINGE (ML) INJECTION EVERY 12 HOURS SCHEDULED
Status: CANCELLED | OUTPATIENT
Start: 2022-10-24

## 2022-10-24 RX ORDER — DIPHENHYDRAMINE HYDROCHLORIDE 50 MG/ML
12.5 INJECTION INTRAMUSCULAR; INTRAVENOUS
Status: CANCELLED | OUTPATIENT
Start: 2022-10-24 | End: 2022-10-25

## 2022-10-24 RX ORDER — SODIUM CHLORIDE 9 MG/ML
INJECTION, SOLUTION INTRAVENOUS PRN
Status: DISCONTINUED | OUTPATIENT
Start: 2022-10-24 | End: 2022-10-24 | Stop reason: HOSPADM

## 2022-10-24 RX ORDER — METOCLOPRAMIDE HYDROCHLORIDE 5 MG/ML
10 INJECTION INTRAMUSCULAR; INTRAVENOUS
Status: CANCELLED | OUTPATIENT
Start: 2022-10-24 | End: 2022-10-25

## 2022-10-24 RX ORDER — LIDOCAINE HYDROCHLORIDE 20 MG/ML
15 SOLUTION OROPHARYNGEAL ONCE
Status: COMPLETED | OUTPATIENT
Start: 2022-10-24 | End: 2022-10-24

## 2022-10-24 RX ORDER — ASCORBIC ACID 500 MG
TABLET ORAL DAILY
COMMUNITY
Start: 2022-06-07

## 2022-10-24 RX ORDER — LIDOCAINE HYDROCHLORIDE 10 MG/ML
1 INJECTION, SOLUTION EPIDURAL; INFILTRATION; INTRACAUDAL; PERINEURAL
Status: DISCONTINUED | OUTPATIENT
Start: 2022-10-24 | End: 2022-10-24 | Stop reason: HOSPADM

## 2022-10-24 RX ORDER — SODIUM CHLORIDE, SODIUM LACTATE, POTASSIUM CHLORIDE, CALCIUM CHLORIDE 600; 310; 30; 20 MG/100ML; MG/100ML; MG/100ML; MG/100ML
INJECTION, SOLUTION INTRAVENOUS CONTINUOUS
Status: DISCONTINUED | OUTPATIENT
Start: 2022-10-24 | End: 2022-10-24 | Stop reason: HOSPADM

## 2022-10-24 RX ORDER — SODIUM CHLORIDE 0.9 % (FLUSH) 0.9 %
5-40 SYRINGE (ML) INJECTION PRN
Status: CANCELLED | OUTPATIENT
Start: 2022-10-24

## 2022-10-24 RX ORDER — SODIUM CHLORIDE 0.9 % (FLUSH) 0.9 %
5-40 SYRINGE (ML) INJECTION PRN
Status: DISCONTINUED | OUTPATIENT
Start: 2022-10-24 | End: 2022-10-24 | Stop reason: HOSPADM

## 2022-10-24 RX ORDER — ONDANSETRON 2 MG/ML
4 INJECTION INTRAMUSCULAR; INTRAVENOUS
Status: CANCELLED | OUTPATIENT
Start: 2022-10-24 | End: 2022-10-25

## 2022-10-24 RX ORDER — SODIUM CHLORIDE 0.9 % (FLUSH) 0.9 %
5-40 SYRINGE (ML) INJECTION EVERY 12 HOURS SCHEDULED
Status: DISCONTINUED | OUTPATIENT
Start: 2022-10-24 | End: 2022-10-24 | Stop reason: HOSPADM

## 2022-10-24 RX ORDER — SODIUM CHLORIDE 9 MG/ML
INJECTION, SOLUTION INTRAVENOUS PRN
Status: CANCELLED | OUTPATIENT
Start: 2022-10-24

## 2022-10-24 RX ORDER — LIDOCAINE HYDROCHLORIDE 20 MG/ML
INJECTION, SOLUTION EPIDURAL; INFILTRATION; INTRACAUDAL; PERINEURAL PRN
Status: DISCONTINUED | OUTPATIENT
Start: 2022-10-24 | End: 2022-10-24 | Stop reason: SDUPTHER

## 2022-10-24 RX ADMIN — PROPOFOL 30 MG: 10 INJECTION, EMULSION INTRAVENOUS at 07:20

## 2022-10-24 RX ADMIN — PROPOFOL 30 MG: 10 INJECTION, EMULSION INTRAVENOUS at 07:27

## 2022-10-24 RX ADMIN — PROPOFOL 30 MG: 10 INJECTION, EMULSION INTRAVENOUS at 07:23

## 2022-10-24 RX ADMIN — LIDOCAINE HYDROCHLORIDE 60 MG: 20 INJECTION, SOLUTION EPIDURAL; INFILTRATION; INTRACAUDAL; PERINEURAL at 07:07

## 2022-10-24 RX ADMIN — PROPOFOL 40 MG: 10 INJECTION, EMULSION INTRAVENOUS at 07:10

## 2022-10-24 RX ADMIN — PROPOFOL 30 MG: 10 INJECTION, EMULSION INTRAVENOUS at 07:16

## 2022-10-24 RX ADMIN — PROPOFOL 30 MG: 10 INJECTION, EMULSION INTRAVENOUS at 07:13

## 2022-10-24 RX ADMIN — LIDOCAINE HYDROCHLORIDE 15 ML: 20 SOLUTION ORAL at 06:53

## 2022-10-24 RX ADMIN — SODIUM CHLORIDE, POTASSIUM CHLORIDE, SODIUM LACTATE AND CALCIUM CHLORIDE: 600; 310; 30; 20 INJECTION, SOLUTION INTRAVENOUS at 06:12

## 2022-10-24 ASSESSMENT — ENCOUNTER SYMPTOMS
WHEEZING: 0
SORE THROAT: 0
COUGH: 0
BACK PAIN: 1
SHORTNESS OF BREATH: 0

## 2022-10-24 ASSESSMENT — PAIN - FUNCTIONAL ASSESSMENT: PAIN_FUNCTIONAL_ASSESSMENT: 0-10

## 2022-10-24 NOTE — OP NOTE
Operative Note      Patient: Colleen John  YOB: 1950  MRN: [id number]    Date of Procedure: 10/24/2022    ESOPHAGOGASTRODUODENOSCOPY   ( EGD )  DATE OF PROCEDURE: 10/24/2022     SURGEON: Kerri Dunham MD    ASSISTANT: None    PREOPERATIVE DIAGNOSIS: History of Small's esophagus    POSTOPERATIVE DIAGNOSIS: Small sliding-type hiatal hernia. Likely Small's esophagus. Gastritis body of the stomach. Miliary type mucosa duodenal bulb stable. Previous duodenal biopsies benign. OPERATION: Upper GI endoscopy with Biopsy    ANESTHESIA: Moderate Sedation     ESTIMATED BLOOD LOSS: None    COMPLICATIONS: None. SPECIMENS:  Was Obtained: Gastric biopsy. GE junction biopsy. HISTORY: The patient is a 67y.o. year old female with history of above preop diagnosis. I recommended esophagogastroduodenoscopy with possible biopsy and I explained the risk, benefits, expected outcome, and alternatives to the procedure. Risks included but are not limited to bleeding, infection, respiratory distress, hypotension, and perforation of the esophagus, stomach, or duodenum. Patient understands and is in agreement. PROCEDURE: The patient was given IV conscious sedation. The patient's SPO2 remained above 90% throughout the procedure. Cetacaine spray given. Patient placed in left lateral position. Olympus  videogastroscope was inserted orally under vision into the esophagus without difficulty and advanced into the stomach then through the pylorus up to the second part of duodenum. Findings:    Retropharyngeal area was grossly normal appearing    Esophagus: abnormal: Small sliding-type hiatal hernia. Likely Small's esophagus. Biopsies obtained. Stomach:    Fundus and Cardia Examined in Retroflexed View: normal    Body: abnormal: Gastritis. Biopsies obtained. Antrum: normal    Duodenum:     Descending: normal    Bulb: abnormal: Miliary type duodenal bulb mucosa which is stable.   Previous biopsies have been benign. While withdrawing the scope the above findings were verified and the scope was removed. The patient tolerated the procedure and conscious sedation without unusual events. In the recovery room patient was examined and remains hemodynamically stable. Discharge home when criteria met.      Recommendations/Plan:   F/U Biopsies  F/U In Office as instructed  Discussed with the family    Electronically signed by Claudio Will MD  on 10/24/2022 at 7:32 AM       on 10/24/2022 at 7:32 AM

## 2022-10-24 NOTE — ANESTHESIA POSTPROCEDURE EVALUATION
POST- ANESTHESIA EVALUATION       Pt Name: Colleen John  MRN: [id number]  [place]: 1950  Date of evaluation: 10/24/2022  Time:  1:56 PM      /66   Pulse 68   Temp 97.7 °F (36.5 °C) (Infrared)   Resp 19   Ht 5' 2\" (1.575 m)   Wt 219 lb (99.3 kg)   SpO2 95%   BMI 40.06 kg/m²      Consciousness Level  Awake  Cardiopulmonary Status  Stable  Pain Adequately Treated YES  Nausea / Vomiting  NO  Adequate Hydration  YES  Anesthesia Related Complications NONE      Electronically signed by Williams Bauer MD on 10/24/2022 at 1:56 PM       Department of Anesthesiology  Postprocedure Note    Patient: Colleen John  MRN: [id number]  YOB: 1950  Date of evaluation: 10/24/2022      Procedure Summary     Date: 10/24/22 Room / Location: [facility] 03 / [facility]    Anesthesia Start: 0704 Anesthesia Stop: 2514    Procedure: EGD BIOPSY (Esophagus) Diagnosis:       History of Small's esophagus      (History of Small's esophagus [Z87.19])    Surgeons: Kerri Dunham MD Responsible Provider: Williams Bauer MD    Anesthesia Type: General ASA Status: 3          Anesthesia Type: General    Miguel Ángel Phase I: Miguel Ángel Score: 10    Miguel Ángel Phase II:        Anesthesia Post Evaluation

## 2022-10-24 NOTE — ANESTHESIA PRE PROCEDURE
Department of Anesthesiology  Preprocedure Note       Name:  Lucila Morton   Age:  67 y.o.  :  1950                                          MRN:  [id number]         Date:  10/24/2022      Surgeon: Tomas Gross):  Poppy Benitez MD    Procedure: Procedure(s):  EGD BIOPSY    Medications prior to admission:   Prior to Admission medications    Medication Sig Start Date End Date Taking?  Authorizing Provider   ascorbic acid (VITAMIN C) 500 MG tablet Take by mouth daily 22  Yes Historical Provider, MD   cyanocobalamin 1000 MCG tablet Take by mouth daily 22  Yes Historical Provider, MD   lisinopril (PRINIVIL;ZESTRIL) 20 MG tablet Take 20 mg by mouth daily    Historical Provider, MD   metoprolol succinate (TOPROL XL) 50 MG extended release tablet Take 50 mg by mouth daily    Historical Provider, MD   Magnesium Oxide (MAG- PO) Take 800 mg by mouth daily Takes in afternoon    Historical Provider, MD   clobetasol (TEMOVATE) 0.05 % cream APPLY TOPICALLY TO THE AFFECTED AREA TWICE DAILY 10/6/22   Francois Medel DO   estradiol (ESTRACE) 0.1 MG/GM vaginal cream PLACE 1 GRAM VAGINALLY EVERY 72 HOURS 22   Francois Medel DO   ferrous sulfate (FE TABS) 325 (65 Fe) MG EC tablet Take 1 tablet by mouth 2 times daily 19   Aide Urban DO   levothyroxine (SYNTHROID) 75 MCG tablet Take 75 mcg by mouth Daily    Historical Provider, MD   sennosides-docusate sodium (SENOKOT-S) 8.6-50 MG tablet Take 1 tablet by mouth daily 19   Aide Urban DO   amLODIPine (NORVASC) 5 MG tablet Take 5 mg by mouth daily    Historical Provider, MD   simvastatin (ZOCOR) 40 MG tablet 20 mg 18   Historical Provider, MD   meloxicam (MOBIC) 15 MG tablet TK 1 T PO QD 18   Historical Provider, MD   PROAIR  (90 BASE) MCG/ACT inhaler  17   Historical Provider, MD Uziel Orozco 2-ELISEO 0.3 MG/0.3ML SOAJ injection  17   Historical Provider, MD   NEXIUM 40 MG delayed release capsule  17   Historical Provider, MD ACCU-CHEK ANNABEL PLUS strip  4/4/17   Historical Provider, MD   Accu-Chek Multiclix Lancets MISC  4/4/17   Historical Provider, MD   HYDROcodone-acetaminophen (NORCO) 5-325 MG per tablet TK 1 T PO TID PRN MUST LAST 30 DAYS 5/11/17   Historical Provider, MD   metFORMIN (GLUCOPHAGE) 1000 MG tablet Take 1,000 mg by mouth 2 times daily (with meals)  4/24/17   Historical Provider, MD   CALCIUM-VITAMIN D PO Take 1 tablet by mouth daily 1200mg/1000mg    Historical Provider, MD   aspirin 81 MG tablet Take 81 mg by mouth daily. Historical Provider, MD   Multiple Vitamins-Minerals (THERAPEUTIC MULTIVITAMIN-MINERALS) tablet Take 1 tablet by mouth daily. Historical Provider, MD       Current medications:    Current Facility-Administered Medications   Medication Dose Route Frequency Provider Last Rate Last Admin    lidocaine PF 1 % injection 1 mL  1 mL IntraDERmal Once PRN Chinyere Ca MD        lactated ringers infusion   IntraVENous Continuous Chinyere Ca  mL/hr at 10/24/22 0612 New Bag at 10/24/22 0612    sodium chloride flush 0.9 % injection 5-40 mL  5-40 mL IntraVENous 2 times per day Chinyere Ca MD        sodium chloride flush 0.9 % injection 5-40 mL  5-40 mL IntraVENous PRN [place] MD Erica        0.9 % sodium chloride infusion   IntraVENous PRN [place] MD Erica        lidocaine viscous hcl (XYLOCAINE) 2 % solution 15 mL  15 mL Mouth/Throat Once Ramo Pablo MD           Allergies:     Allergies   Allergen Reactions    Bee Venom     Codeine     Seasonal        Problem List:    Patient Active Problem List   Diagnosis Code    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    Recurrent Dysplasia M86.160    Lichen sclerosus of female genitalia N90.4    [facility] w/[facility] 12/18/18 Z98.890    Cervical dysplasia N87.9    Hypertension I10    Hypothyroidism E03.9    RALH BSO w/ Cystoscopy & Retrograde Bladder Instillation 5/16/19 Z90.710    Postoperative state Z98.890    [place] grade 2 cystocele N81.10    Urinary incontinence R32       Past Medical History:        Diagnosis Date    Abnormal Pap smear of cervix     Anemia     Small's esophagus     Chronic back pain     Chronic sinusitis     Diabetes mellitus (HCC)     Environmental allergies     GERD (gastroesophageal reflux disease)     Hyperlipidemia     Hypertension     OA (osteoarthritis of spine)     generalized.  PONV (postoperative nausea and vomiting)     Thyroid disease     Wears glasses        Past Surgical History:        Procedure Laterality Date    APPENDECTOMY      CHOLECYSTECTOMY      COLONOSCOPY      COLPOSCOPY  06/15/2017    dr bella- has had previous colp before    ENDOSCOPY, COLON, DIAGNOSTIC      EGD    ENDOSCOPY, COLON, DIAGNOSTIC  14    barretts esoph, milliary lesions duodenal bulb, hiatal hernia, healed antral ulcer.  FOOT SURGERY Right     foreign body removed    HYSTERECTOMY ABDOMINAL N/A 2019    HYSTERECTOMY ABDOMINAL LAPAROSCOPIC ROBOTIC W/BSO, RETROGRADE BLADDER INSTILLATION & CYSTO performed by Felix West DO at [address] Bilateral     knees    LEEP N/A 2018    LEEP performed by Felix West DO at [address] [facility] FLX DX W/COLLJ [address] PFRMD N/A 2018    COLONOSCOPY performed by Moe Baron MD at [address] ENDOSCOPY  9/11/15    BARRETTS    UPPER GASTROINTESTINAL ENDOSCOPY N/A 2019    EGD BIOPSY performed by Moe Baron MD at [address] [facility] ENDO       Social History:    Social History     Tobacco Use    Smoking status: Former     Types: Cigarettes     Quit date: 2011     Years since quittin.8    Smokeless tobacco: Never   Substance Use Topics    Alcohol use:  No                                Counseling given: Not Answered      Vital Signs (Current):   Vitals:    10/24/22 0551   BP: 136/75   Pulse: 78   Resp: 18   Temp: 97.3 °F (36.3 °C)   TempSrc: Infrared   SpO2: 97%   Weight: 219 lb (99.3 kg)   Height: 5' 2\" (1.575 m)                                              BP Readings from Last 3 Encounters:   10/24/22 136/75   08/02/21 122/70   07/27/20 128/60       NPO Status: Time of last liquid consumption: 2359                        Time of last solid consumption: 1700                        Date of last liquid consumption: 10/23/22                        Date of last solid food consumption: 10/23/22    BMI:   Wt Readings from Last 3 Encounters:   10/24/22 219 lb (99.3 kg)   10/10/22 219 lb (99.3 kg)   08/02/21 234 lb 6 oz (106.3 kg)     Body mass index is 40.06 kg/m².     CBC:   Lab Results   Component Value Date/Time    WBC 6.7 05/02/2019 11:20 AM    RBC 3.43 05/02/2019 11:20 AM    HGB 8.0 05/17/2019 01:39 PM    HCT 24.3 05/17/2019 01:39 PM    MCV 88.6 05/02/2019 11:20 AM    RDW 14.3 05/02/2019 11:20 AM     05/02/2019 11:20 AM       CMP:   Lab Results   Component Value Date/Time     05/17/2019 01:39 PM    K 4.6 05/17/2019 01:39 PM    CL 94 05/17/2019 01:39 PM    CO2 20 05/17/2019 01:39 PM    BUN 25 05/17/2019 01:39 PM    CREATININE 0.89 05/17/2019 01:39 PM    GFRAA >60 05/17/2019 01:39 PM    LABGLOM >60 05/17/2019 01:39 PM    GLUCOSE 134 05/17/2019 01:39 PM    GLUCOSE 111 10/07/2016 08:33 AM    PROT 7.5 05/02/2019 11:20 AM    CALCIUM 7.7 05/17/2019 01:39 PM    BILITOT 0.28 05/02/2019 11:20 AM    ALKPHOS 72 05/02/2019 11:20 AM    AST 19 05/02/2019 11:20 AM    ALT 15 05/02/2019 11:20 AM       POC Tests:   Recent Labs     10/24/22  0612   POCGLU 100       Coags:   Lab Results   Component Value Date/Time    PROTIME 12.9 05/16/2019 07:31 AM    INR 1.0 05/16/2019 07:31 AM    APTT 36.6 05/16/2019 07:31 AM       HCG (If Applicable): No results found for: PREGTESTUR, PREGSERUM, HCG, HCGQUANT     ABGs: No results found for: PHART, PO2ART, WSI9FEV, FEH7ZUZ, BEART, P6DJXLSC     Type & Screen (If Applicable):  No results found for: LABABO, LABRH    Drug/Infectious Status (If Applicable):  No results found for: HIV, HEPCAB    COVID-19 Screening (If Applicable): No results found for: COVID19        Anesthesia Evaluation  Patient summary reviewed and Nursing notes reviewed   history of anesthetic complications: PONV. Airway: Mallampati: II  TM distance: >3 FB   Neck ROM: full  Mouth opening: > = 3 FB   Dental:          Pulmonary:Negative Pulmonary ROS and normal exam  breath sounds clear to auscultation                             Cardiovascular:    (+) hypertension:,         Rhythm: regular  Rate: normal                    Neuro/Psych:   Negative Neuro/Psych ROS              GI/Hepatic/Renal:   (+) GERD:, morbid obesity          Endo/Other:    (+) DiabetesType II DM, , hypothyroidism: arthritis:., .                 Abdominal:             Vascular: negative vascular ROS. Other Findings:           Anesthesia Plan      general     ASA 3       Induction: intravenous. MIPS: Prophylactic antiemetics administered. Anesthetic plan and risks discussed with patient. Plan discussed with CRNA.                     Chan Francisco MD   10/24/2022

## 2022-10-24 NOTE — DISCHARGE INSTRUCTIONS
EGD DISCHARGE INSTRUCTIONS    Activity:  Rest today. No driving, operating machinery, or making any important decisions today. May resume normal activity tomorrow. Diet:  Following EGD eat slowly, chew food well, cut food into small pieces-Avoid greasy, spicy, \"crunchy\" foods X 48 hours. Call your Doctor if you have any of the following:  -Passing blood rectally or vomiting blood (it may be red or black). -Persistent nausea/vomiting  -Severe abdominal or chest pain not relieved with passing gas  -Fever of 100 degrees or more  -Redness or swelling at the IV site  -Severe sore throat or neck pain      Hiatal Hernia: Care Instructions  Your Care Instructions  A hiatal hernia occurs when part of the stomach bulges into the chest cavity. A hiatal hernia may allow stomach acid and juices to back up into the esophagus (acid reflux). This can cause a feeling of burning, warmth, heat, or pain behind the breastbone. This feeling may often occur after you eat, soon after you lie down, or when you bend forward, and it may come and go. You also may have a sour taste in your mouth. These symptoms are commonly known as heartburn or reflux. But not all hiatal hernias cause symptoms. Follow-up care is a key part of your treatment and safety. Be sure to make and go to all appointments, and call your doctor if you are having problems. It's also a good idea to know your test results and keep a list of the medicines you take. How can you care for yourself at home? Take your medicines exactly as prescribed. Call your doctor if you think you are having a problem with your medicine. Do not take aspirin or other nonsteroidal anti-inflammatory drugs (NSAIDs), such as ibuprofen (Advil, Motrin) or naproxen (Aleve), unless your doctor says it is okay. Ask your doctor what you can take for pain. Your doctor may recommend over-the-counter medicine.  For mild or occasional indigestion, antacids such as Tums, Gaviscon, Maalox, or Mylanta may help. Your doctor also may recommend over-the-counter acid reducers, such as famotidine (Pepcid AC), cimetidine (Tagamet HB), or omeprazole (Prilosec). Read and follow all instructions on the label. If you use these medicines often, talk with your doctor. Change your eating habits. It's best to eat several small meals instead of two or three large meals. After you eat, wait 2 to 3 hours before you lie down. Late-night snacks aren't a good idea. Avoid foods that make your symptoms worse. These may include chocolate, mint, alcohol, pepper, spicy foods, high-fat foods, or drinks with caffeine in them, such as tea, coffee, yeison, or energy drinks. If your symptoms are worse after you eat a certain food, you may want to stop eating it to see if your symptoms get better. Do not smoke or chew tobacco.  If you get heartburn at night, raise the head of your bed 6 to 8 inches by putting the frame on blocks or placing a foam wedge under the head of your mattress. (Adding extra pillows does not work.)  Do not wear tight clothing around your middle. Lose weight if you need to. Losing just 5 to 10 pounds can help. When should you call for help? Call your doctor now or seek immediate medical care if:    You have new or worse belly pain. You are vomiting. Watch closely for changes in your health, and be sure to contact your doctor if:    You have new or worse symptoms of indigestion. You have trouble or pain swallowing. You are losing weight. You do not get better as expected. Where can you learn more? Go to https://Mobakids.[employer]. org and sign in to your [employer] account. Enter L139 in the [employer] box to learn more about \"Hiatal Hernia: Care Instructions. \"     If you do not have an account, please click on the \"Sign Up Now\" link. Current as of: June 6, 2022               Content Version: 13.4  © 5389-1798 Healthwise, [employer].    Care instructions adapted under license by [facility] ([facility]). If you have questions about a medical condition or this instruction, always ask your healthcare professional. [address] any warranty or liability for your use of this information. Gastritis: Care Instructions  Your Care Instructions     Gastritis is a sore and upset stomach. It happens when something irritates the stomach lining. Many things can cause it. These include an infection such as the flu or something you ate or drank. Medicines or a sore on the lining of the stomach (ulcer) also can cause it. Your belly may bloat and ache. You may belch, vomit, and feel sick to your stomach. You should be able to relieve the problem by taking medicine. And it may help to change your diet. If gastritis lasts, your doctor may prescribe medicine. Follow-up care is a key part of your treatment and safety. Be sure to make and go to all appointments, and call your doctor if you are having problems. It's also a good idea to know your test results and keep a list of the medicines you take. How can you care for yourself at home? If your doctor prescribed antibiotics, take them as directed. Do not stop taking them just because you feel better. You need to take the full course of antibiotics. Be safe with medicines. If your doctor prescribed medicine to decrease stomach acid, take it as directed. Call your doctor if you think you are having a problem with your medicine. Do not take any other medicine, including over-the-counter pain relievers, without talking to your doctor first.  If your doctor recommends over-the-counter medicine to reduce stomach acid, such as Pepcid AC (famotidine), Prilosec (omeprazole), or Tagamet HB (cimetidine) follow the directions on the label. Drink plenty of fluids to prevent dehydration. Choose water and other clear liquids.  If you have kidney, heart, or liver disease and have to limit fluids, talk with your doctor before you increase the amount of fluids you drink. Avoid foods that make your symptoms worse. These may include chocolate, mint, alcohol, pepper, spicy foods, high-fat foods, or drinks with caffeine in them, such as tea, coffee, yeison, or energy drinks. If your symptoms are worse after you eat a certain food, you may want to stop eating it to see if your symptoms get better. When should you call for help? Call 911 anytime you think you may need emergency care. For example, call if:    You vomit blood or what looks like coffee grounds. You pass maroon or very bloody stools. Call your doctor now or seek immediate medical care if:    You start breathing fast and have not produced urine in the last 8 hours. You cannot keep fluids down. Watch closely for changes in your health, and be sure to contact your doctor if:    You do not get better as expected. Where can you learn more? Go to https://Quest Online[employer]KeepIdeas.[employer]. org and sign in to your [employer] account. Enter [id number] in the [facility] box to learn more about \"Gastritis: Care Instructions. \"     If you do not have an account, please click on the \"Sign Up Now\" link. Current as of: June 6, 2022               Content Version: 13.4  © 2006-2022 Healthwise, Incorporated. Care instructions adapted under license by [facility] ([facility]). If you have questions about a medical condition or this instruction, always ask your healthcare professional. [place] [address] any warranty or liability for your use of this information. Small's Esophagus: Care Instructions  Overview     The esophagus is the tube that connects the throat to the stomach. Food and liquids go through this tube. In Small's esophagus, the cells that line the tube change. This is usually because of gastroesophageal reflux disease (GERD). GERD causes acid from your stomach to back up into the esophagus.   When you have Small's esophagus, you are slightly more likely to get cancer of the esophagus. So regular testing is important to watch for signs of this cancer. You can treat GERD to control your symptoms and feel better. Follow-up care is a key part of your treatment and safety. Be sure to make and go to all appointments, and call your doctor if you are having problems. It's also a good idea to know your test results and keep a list of the medicines you take. How can you care for yourself at home? Take your medicines exactly as prescribed. Call your doctor if you think you are having a problem with your medicine. If you take over-the-counter medicine, such as antacids or acid reducers, follow all instructions on the label. If you use these medicines often, talk with your doctor. Be careful when you take over-the-counter antacid medicines. Many of these medicines have aspirin in them. Read the label to make sure that you are not taking more than the recommended dose. Too much aspirin can be harmful. Do not smoke or chew tobacco. Smoking can make gastroesophageal reflux disease (GERD) worse. If you need help quitting, talk to your doctor about stop-smoking programs and medicines. These can increase your chances of quitting for good. Avoid foods that make your symptoms worse. These may include chocolate, mint, alcohol, pepper, spicy foods, high-fat foods, or drinks with caffeine in them, such as tea, coffee, yeison, or energy drinks. If your symptoms are worse after you eat a certain food, you may want to stop eating it to see if your symptoms get better. Eat smaller meals, and more often. After eating, wait 2 to 3 hours before you lie down. Raise the head of your bed 6 in. (15 cm) to 8 in. (20 cm) by putting blocks under the frame or a foam wedge under the head of the mattress. Adding extra pillows does not work. Do not wear tight clothing around your midsection. Lose weight if you are overweight or obese. Losing just 5 to 10 pounds can help.   Go to regular follow-up testing, even if you don't have symptoms. It helps your doctor watch for signs of more changes that may lead to cancer. When should you call for help? Call 911  anytime you think you may need emergency care. For example, call if:    Food or something sharp is stuck in your esophagus and you can't swallow at all. Call your doctor now or seek immediate medical care if:    You have new or worse belly pain. You are vomiting. Watch closely for changes in your health, and be sure to contact your doctor if:    You have new or worse symptoms of reflux. You have any pain or trouble swallowing. You are losing weight. You do not get better as expected. Where can you learn more? Go to https://larala.comeb.[employer]. org and sign in to your [employer] account. Enter L146 in the [employer] box to learn more about \"Small's Esophagus: Care Instructions. \"     If you do not have an account, please click on the \"Sign Up Now\" link. Current as of: June 6, 2022               Content Version: 13.4  © 4029-1509 Healthwise, Incorporated. Care instructions adapted under license by [facility] ([facility]). If you have questions about a medical condition or this instruction, always ask your healthcare professional. [address] any warranty or liability for your use of this information.

## 2022-10-24 NOTE — H&P
HISTORY and [id number]       NAME:  Yvan Krueger  MRN: [id number]   YOB: 1950   Date: 10/24/2022   Age: 67 y.o. Gender: female       COMPLAINT AND PRESENT HISTORY:             Yvan Krueger is 67 y.o. female, undergoing EGD biopsy. Pt has had previous EGD last in 2019. History of Small's Esophagus. History of GERD. Takes nexium with good relief. Denies abdominal pain, dysphagia, nausea, vomiting, diarrhea, constipation. No blood in stools, black tarry stools. Denies changes in appetite and unintended weight loss. Denies family history of esophageal and colon cancer. NPO p MN. Took amlodipine, metoprolol, lisinopril and levothyroxine this am with sip of water. Stopped low dose aspirin and mobic one week ago. Denies taking any anticoagulants and blood thinning medications. Denies recent or current chest pain/pressure, palpitations, SOB, recent URI, fever or chills. She did not check her BS this am prior to arrival.     BP Readings from Last 3 Encounters:   08/02/21 122/70   07/27/20 128/60   09/16/19 116/74       PAST MEDICAL HISTORY     Past Medical History:   Diagnosis Date    Abnormal Pap smear of cervix     Anemia     Small's esophagus     Chronic back pain     Chronic sinusitis     Diabetes mellitus (HCC)     Environmental allergies     GERD (gastroesophageal reflux disease)     Hyperlipidemia     Hypertension     OA (osteoarthritis of spine)     generalized. PONV (postoperative nausea and vomiting)     Thyroid disease     Wears glasses        SURGICAL HISTORY       Past Surgical History:   Procedure Laterality Date    APPENDECTOMY      CHOLECYSTECTOMY      COLONOSCOPY      COLPOSCOPY  06/15/2017    dr bella- has had previous colp before    ENDOSCOPY, COLON, DIAGNOSTIC      EGD    ENDOSCOPY, COLON, DIAGNOSTIC  09-05-14    barretts esoph, milliary lesions duodenal bulb, hiatal hernia, healed antral ulcer.     FOOT SURGERY Right     foreign body removed HYSTERECTOMY ABDOMINAL N/A 2019    HYSTERECTOMY ABDOMINAL LAPAROSCOPIC ROBOTIC W/BSO, RETROGRADE BLADDER INSTILLATION & CYSTO performed by David Beach DO at [facility] ([place], [place]) 1 Bilateral     knees    LEEP N/A 2018    LEEP performed by David Beach DO at [address] [facility] FLX DX W/COLLJ Roshni 1978 PFRMD N/A 2018    COLONOSCOPY performed by Andriy Fairchild MD at [address] [facility]  9/11/15    BARRETTS    UPPER GASTROINTESTINAL ENDOSCOPY N/A 2019    EGD BIOPSY performed by Andriy Fairchild MD at [address]       Family History   Problem Relation Age of Onset    Cancer Mother         gallbladder, liver       SOCIAL HISTORY       Social History     Socioeconomic History    Marital status:    Tobacco Use    Smoking status: Former     Types: Cigarettes     Quit date: 2011     Years since quittin.8    Smokeless tobacco: Never   Vaping Use    Vaping Use: Never used   Substance and Sexual Activity    Alcohol use: No    Drug use: No    Sexual activity: Not Currently        REVIEW OF SYSTEMS      Allergies   Allergen Reactions    Bee Venom     Codeine     Seasonal        No current facility-administered medications on file prior to encounter. Current Outpatient Medications on File Prior to Encounter   Medication Sig Dispense Refill    estradiol (ESTRACE) 0.1 MG/GM vaginal cream PLACE 1 GRAM VAGINALLY EVERY 72 HOURS 42.5 g 2    ferrous sulfate (FE TABS) 325 (65 Fe) MG EC tablet Take 1 tablet by mouth 2 times daily 90 tablet 3    levothyroxine (SYNTHROID) 75 MCG tablet Take 75 mcg by mouth Daily      sennosides-docusate sodium (SENOKOT-S) 8.6-50 MG tablet Take 1 tablet by mouth daily 30 tablet 0    amLODIPine (NORVASC) 5 MG tablet Take 5 mg by mouth daily      simvastatin (ZOCOR) 40 MG tablet simvastatin 40 mg tablet   Take 0.5 tablets every day by oral route.       meloxicam (MOBIC) 15 MG tablet TK 1 T PO QD  0    PROAIR  (90 BASE) MCG/ACT inhaler  (Patient not taking: Reported on 10/10/2022)      EPIPEN 2-ELISEO 0.3 MG/0.3ML SOAJ injection       NEXIUM 40 MG delayed release capsule       ACCU-CHEK ANNABEL PLUS strip       Accu-Chek Multiclix Lancets MISC       HYDROcodone-acetaminophen (NORCO) 5-325 MG per tablet TK 1 T PO TID PRN MUST LAST 30 DAYS  0    metFORMIN (GLUCOPHAGE) 1000 MG tablet Take 1,000 mg by mouth 2 times daily (with meals)       CALCIUM-VITAMIN D PO Take 1 tablet by mouth daily 1200mg/1000mg      aspirin 81 MG tablet Take 81 mg by mouth daily. lisinopril-hydrochlorothiazide (PRINZIDE;ZESTORETIC) 20-12.5 MG per tablet Take 1 tablet by mouth daily. (Patient not taking: Reported on 10/10/2022)      Multiple Vitamins-Minerals (THERAPEUTIC MULTIVITAMIN-MINERALS) tablet Take 1 tablet by mouth daily. Notation: Above medications are not currently reconciled at time of signing this H&P note, to be reconciled in pre-op per RN. Review of Systems   Constitutional:  Negative for appetite change, chills, fever and unexpected weight change. HENT:  Negative for congestion, dental problem, hearing loss and sore throat. Eyes:  Positive for visual disturbance (Glasses). Respiratory:  Negative for cough, shortness of breath and wheezing. Cardiovascular:  Negative for chest pain, palpitations and leg swelling. Gastrointestinal:         See HPI   Genitourinary:  Positive for frequency. Negative for dysuria and urgency. Musculoskeletal:  Positive for arthralgias, back pain and gait problem. Negative for neck pain. Skin:  Negative for rash and wound. Neurological:  Negative for dizziness, light-headedness and headaches. Hematological:  Bruises/bleeds easily. Psychiatric/Behavioral: Negative. GENERAL PHYSICAL EXAM     Vitals: Review vitals per RN flowsheet. Physical Exam  Constitutional:       General: She is not in acute distress.      Appearance: She is well-developed. She is obese. She is not ill-appearing. HENT:      Head: Normocephalic and atraumatic. Nose: Nose normal.      Mouth/Throat:      Mouth: Mucous membranes are moist.      Pharynx: Oropharynx is clear. No oropharyngeal exudate or posterior oropharyngeal erythema. Eyes:      General: No scleral icterus. Right eye: No discharge. Left eye: No discharge. Pupils: Pupils are equal, round, and reactive to light. Neck:      Trachea: No tracheal deviation. Cardiovascular:      Rate and Rhythm: Normal rate and regular rhythm. Heart sounds: Normal heart sounds. No murmur heard. No friction rub. No gallop. Pulmonary:      Effort: Pulmonary effort is normal. No respiratory distress. Breath sounds: Normal breath sounds. No wheezing, rhonchi or rales. Abdominal:      General: Bowel sounds are normal. There is no distension. Palpations: Abdomen is soft. Tenderness: There is no abdominal tenderness. There is no guarding. Musculoskeletal:      Cervical back: Neck supple. Right lower leg: No edema. Left lower leg: No edema. Skin:     General: Skin is warm and dry. Coloration: Skin is not jaundiced. Findings: No bruising, erythema or rash. Neurological:      General: No focal deficit present. Mental Status: She is alert and oriented to person, place, and time. Cranial Nerves: No cranial nerve deficit.       Gait: Gait abnormal.   Psychiatric:         Mood and Affect: Mood normal.      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

## 2022-10-25 LAB — SURGICAL PATHOLOGY REPORT: NORMAL

## 2022-10-28 DIAGNOSIS — N89.8 VAGINAL IRRITATION: ICD-10-CM

## 2022-10-28 DIAGNOSIS — Z90.710 S/P HYSTERECTOMY: ICD-10-CM

## 2022-10-28 DIAGNOSIS — N95.2 VAGINAL ATROPHY: ICD-10-CM

## 2022-11-01 RX ORDER — ESTRADIOL 0.1 MG/G
CREAM VAGINAL
Qty: 42.5 G | Refills: 2 | Status: SHIPPED | OUTPATIENT
Start: 2022-11-01

## 2023-03-07 DIAGNOSIS — N90.89 VULVAR LESION: ICD-10-CM

## 2023-03-07 DIAGNOSIS — N89.8 VAGINAL ITCHING: ICD-10-CM

## 2023-03-07 DIAGNOSIS — N90.4 LICHEN SCLEROSUS OF FEMALE GENITALIA: ICD-10-CM

## 2023-03-08 RX ORDER — CLOBETASOL PROPIONATE 0.5 MG/G
CREAM TOPICAL
Qty: 15 G | Refills: 5 | Status: SHIPPED | OUTPATIENT
Start: 2023-03-08

## 2023-07-11 DIAGNOSIS — N90.89 VULVAR LESION: ICD-10-CM

## 2023-07-11 DIAGNOSIS — N90.4 LICHEN SCLEROSUS OF FEMALE GENITALIA: ICD-10-CM

## 2023-07-11 DIAGNOSIS — N89.8 VAGINAL ITCHING: ICD-10-CM

## 2023-07-12 RX ORDER — CLOBETASOL PROPIONATE 0.5 MG/G
CREAM TOPICAL
Qty: 15 G | Refills: 5 | Status: SHIPPED | OUTPATIENT
Start: 2023-07-12

## 2023-07-17 DIAGNOSIS — Z90.710 S/P HYSTERECTOMY: ICD-10-CM

## 2023-07-17 DIAGNOSIS — N89.8 VAGINAL IRRITATION: ICD-10-CM

## 2023-07-17 DIAGNOSIS — N95.2 VAGINAL ATROPHY: ICD-10-CM

## 2023-07-23 RX ORDER — ESTRADIOL 0.1 MG/G
CREAM VAGINAL
Qty: 42.5 G | Refills: 2 | Status: SHIPPED | OUTPATIENT
Start: 2023-07-23

## 2023-08-30 ENCOUNTER — TELEPHONE (OUTPATIENT)
Dept: OBGYN CLINIC | Age: 73
End: 2023-08-30

## 2023-09-25 ENCOUNTER — HOSPITAL ENCOUNTER (OUTPATIENT)
Dept: PREADMISSION TESTING | Age: 73
Discharge: HOME | End: 2023-09-29

## 2023-09-25 VITALS — BODY MASS INDEX: 41.41 KG/M2 | WEIGHT: 225 LBS | HEIGHT: 62 IN

## 2023-09-25 NOTE — PROGRESS NOTES
Pre-op Instructions For Out-Patient Endoscopy Surgery    Medication Instructions:  Please stop herbs and any supplements now (includes vitamins and minerals). Please contact your surgeon and prescribing physician for pre-op instructions for any blood thinners. Aspirin and Meloxicam     If you have inhalers/aerosol treatments at home, please use them the morning of your surgery and bring the inhalers with you to the hospital.    Please take the following medications the morning of your surgery with a sip of water:    Inhaler, Metoprolol, Amlodipine and Levothyroxine. Surgery Instructions:  After midnight before surgery:  Do not eat or drink anything, including water, mints, gum, and hard candy. You may brush your teeth without swallowing. No smoking, chewing tobacco, or street drugs. Please shower or bathe before surgery. Please do not wear any cologne, lotion, powder, jewelry, piercings, perfume, makeup, nail polish, hair accessories, or hair spray on the day of surgery. Wear loose comfortable clothing. Leave your valuables at home but bring a payment source for any after-surgery prescriptions you plan to fill at [facility]. Bring a storage case for any glasses/contacts. An adult who is responsible for you MUST drive you home and should be with you for the first 24 hours after surgery. The Day of Surgery:  Arrive at [facility] Surgery Entrance at the time directed by your surgeon and check in at the desk. If you have a living will or healthcare power of , please bring a copy. You will be taken to the pre-op holding area where you will be prepared for surgery. A physical assessment will be performed by a nurse practitioner or house officer.   Your IV will be started and you will meet your anesthesiologist.    When you go to surgery, your family will be directed to the surgical waiting room, where the doctor should speak with them after your

## 2023-10-06 NOTE — PRE-PROCEDURE INSTRUCTIONS
Have you received your Prep? Any questions with prep instructions? Only Clear Liquid Diet day before. Nothing to eat after midnight day before procedure. Are you taking any blood thinners? If so, you need to Stop. Remove any jewelry and body piercings. Do you wear glasses? If so, please bring a case to store them in. Are you having any Covid symptoms? Do you have any new rashes, infections, etc. that we should be aware of? Do you have a ride home the day of surgery? It cannot be a cab or medical transportation.   Verify surgery time/date and what time to arrive at hospital.    No answer, constantino left

## 2023-10-09 ENCOUNTER — ANESTHESIA EVENT (OUTPATIENT)
Dept: ENDOSCOPY | Age: 73
End: 2023-10-09
Payer: MEDICARE

## 2023-10-10 ENCOUNTER — HOSPITAL ENCOUNTER (OUTPATIENT)
Age: 73
Setting detail: OUTPATIENT SURGERY
Discharge: HOME | End: 2023-10-10
Attending: SURGERY | Admitting: SURGERY
Payer: MEDICARE

## 2023-10-10 ENCOUNTER — ANESTHESIA (OUTPATIENT)
Dept: ENDOSCOPY | Age: 73
End: 2023-10-10
Payer: MEDICARE

## 2023-10-10 VITALS
TEMPERATURE: 97.1 F | HEART RATE: 82 BPM | WEIGHT: 226 LBS | OXYGEN SATURATION: 97 % | RESPIRATION RATE: 16 BRPM | HEIGHT: 62 IN | DIASTOLIC BLOOD PRESSURE: 68 MMHG | BODY MASS INDEX: 41.59 KG/M2 | SYSTOLIC BLOOD PRESSURE: 124 MMHG

## 2023-10-10 DIAGNOSIS — Z87.19 HISTORY OF DIVERTICULOSIS: ICD-10-CM

## 2023-10-10 LAB
GLUCOSE BLD-MCNC: 88 MG/DL (ref 65–105)
GLUCOSE BLD-MCNC: 90 MG/DL (ref 65–105)

## 2023-10-10 PROCEDURE — 2500000003 HC RX 250 WO HCPCS: Performed by: ANESTHESIOLOGY

## 2023-10-10 PROCEDURE — 88305 TISSUE EXAM BY PATHOLOGIST: CPT

## 2023-10-10 PROCEDURE — 82947 ASSAY GLUCOSE BLOOD QUANT: CPT

## 2023-10-10 PROCEDURE — 7100000010 HC PHASE II RECOVERY - FIRST 15 MIN: Performed by: SURGERY

## 2023-10-10 PROCEDURE — 7100000031 HC ASPR PHASE II RECOVERY - ADDTL 15 MIN: Performed by: SURGERY

## 2023-10-10 PROCEDURE — 2580000003 HC RX 258: Performed by: ANESTHESIOLOGY

## 2023-10-10 PROCEDURE — 2709999900 HC NON-CHARGEABLE SUPPLY: Performed by: SURGERY

## 2023-10-10 PROCEDURE — 6370000000 HC RX 637 (ALT 250 FOR IP): Performed by: ANESTHESIOLOGY

## 2023-10-10 PROCEDURE — 2500000003 HC RX 250 WO HCPCS: Performed by: NURSE ANESTHETIST, CERTIFIED REGISTERED

## 2023-10-10 PROCEDURE — 7100000000 HC PACU RECOVERY - FIRST 15 MIN: Performed by: SURGERY

## 2023-10-10 PROCEDURE — 7100000011 HC PHASE II RECOVERY - ADDTL 15 MIN: Performed by: SURGERY

## 2023-10-10 PROCEDURE — 7100000001 HC PACU RECOVERY - ADDTL 15 MIN: Performed by: SURGERY

## 2023-10-10 PROCEDURE — 6360000002 HC RX W HCPCS: Performed by: NURSE ANESTHETIST, CERTIFIED REGISTERED

## 2023-10-10 PROCEDURE — 3609010300 HC COLONOSCOPY W/BIOPSY SINGLE/MULTIPLE: Performed by: SURGERY

## 2023-10-10 PROCEDURE — 3700000001 HC ADD 15 MINUTES (ANESTHESIA): Performed by: SURGERY

## 2023-10-10 PROCEDURE — 7100000030 HC ASPR PHASE II RECOVERY - FIRST 15 MIN: Performed by: SURGERY

## 2023-10-10 PROCEDURE — 3700000000 HC ANESTHESIA ATTENDED CARE: Performed by: SURGERY

## 2023-10-10 RX ORDER — SODIUM CHLORIDE 9 MG/ML
INJECTION, SOLUTION INTRAVENOUS PRN
Status: DISCONTINUED | OUTPATIENT
Start: 2023-10-10 | End: 2023-10-10 | Stop reason: HOSPADM

## 2023-10-10 RX ORDER — DIPHENHYDRAMINE HYDROCHLORIDE 50 MG/ML
12.5 INJECTION INTRAMUSCULAR; INTRAVENOUS
Status: DISCONTINUED | OUTPATIENT
Start: 2023-10-10 | End: 2023-10-10 | Stop reason: HOSPADM

## 2023-10-10 RX ORDER — SODIUM CHLORIDE 0.9 % (FLUSH) 0.9 %
5-40 SYRINGE (ML) INJECTION PRN
Status: DISCONTINUED | OUTPATIENT
Start: 2023-10-10 | End: 2023-10-10 | Stop reason: HOSPADM

## 2023-10-10 RX ORDER — ONDANSETRON 2 MG/ML
4 INJECTION INTRAMUSCULAR; INTRAVENOUS
Status: DISCONTINUED | OUTPATIENT
Start: 2023-10-10 | End: 2023-10-10 | Stop reason: HOSPADM

## 2023-10-10 RX ORDER — SCOLOPAMINE TRANSDERMAL SYSTEM 1 MG/1
1 PATCH, EXTENDED RELEASE TRANSDERMAL ONCE
Status: DISCONTINUED | OUTPATIENT
Start: 2023-10-10 | End: 2023-10-10 | Stop reason: HOSPADM

## 2023-10-10 RX ORDER — SODIUM CHLORIDE 9 MG/ML
INJECTION, SOLUTION INTRAVENOUS CONTINUOUS
Status: DISCONTINUED | OUTPATIENT
Start: 2023-10-10 | End: 2023-10-10 | Stop reason: HOSPADM

## 2023-10-10 RX ORDER — PROPOFOL 10 MG/ML
INJECTION, EMULSION INTRAVENOUS PRN
Status: DISCONTINUED | OUTPATIENT
Start: 2023-10-10 | End: 2023-10-10 | Stop reason: SDUPTHER

## 2023-10-10 RX ORDER — SODIUM CHLORIDE 0.9 % (FLUSH) 0.9 %
5-40 SYRINGE (ML) INJECTION EVERY 12 HOURS SCHEDULED
Status: DISCONTINUED | OUTPATIENT
Start: 2023-10-10 | End: 2023-10-10 | Stop reason: HOSPADM

## 2023-10-10 RX ORDER — FENTANYL CITRATE 0.05 MG/ML
25 INJECTION, SOLUTION INTRAMUSCULAR; INTRAVENOUS EVERY 5 MIN PRN
Status: DISCONTINUED | OUTPATIENT
Start: 2023-10-10 | End: 2023-10-10 | Stop reason: HOSPADM

## 2023-10-10 RX ORDER — LIDOCAINE HYDROCHLORIDE 10 MG/ML
1 INJECTION, SOLUTION EPIDURAL; INFILTRATION; INTRACAUDAL; PERINEURAL
Status: COMPLETED | OUTPATIENT
Start: 2023-10-10 | End: 2023-10-10

## 2023-10-10 RX ORDER — FENTANYL CITRATE 0.05 MG/ML
50 INJECTION, SOLUTION INTRAMUSCULAR; INTRAVENOUS EVERY 5 MIN PRN
Status: DISCONTINUED | OUTPATIENT
Start: 2023-10-10 | End: 2023-10-10 | Stop reason: HOSPADM

## 2023-10-10 RX ORDER — ROCURONIUM BROMIDE 10 MG/ML
INJECTION, SOLUTION INTRAVENOUS PRN
Status: DISCONTINUED | OUTPATIENT
Start: 2023-10-10 | End: 2023-10-10 | Stop reason: SDUPTHER

## 2023-10-10 RX ORDER — ONDANSETRON 2 MG/ML
INJECTION INTRAMUSCULAR; INTRAVENOUS PRN
Status: DISCONTINUED | OUTPATIENT
Start: 2023-10-10 | End: 2023-10-10 | Stop reason: SDUPTHER

## 2023-10-10 RX ORDER — LIDOCAINE HYDROCHLORIDE 20 MG/ML
INJECTION, SOLUTION EPIDURAL; INFILTRATION; INTRACAUDAL; PERINEURAL PRN
Status: DISCONTINUED | OUTPATIENT
Start: 2023-10-10 | End: 2023-10-10 | Stop reason: SDUPTHER

## 2023-10-10 RX ORDER — DEXAMETHASONE SODIUM PHOSPHATE 4 MG/ML
INJECTION, SOLUTION INTRA-ARTICULAR; INTRALESIONAL; INTRAMUSCULAR; INTRAVENOUS; SOFT TISSUE PRN
Status: DISCONTINUED | OUTPATIENT
Start: 2023-10-10 | End: 2023-10-10 | Stop reason: SDUPTHER

## 2023-10-10 RX ADMIN — DEXAMETHASONE SODIUM PHOSPHATE 8 MG: 4 INJECTION INTRA-ARTICULAR; INTRALESIONAL; INTRAMUSCULAR; INTRAVENOUS; SOFT TISSUE at 07:15

## 2023-10-10 RX ADMIN — PROPOFOL 150 MG: 10 INJECTION, EMULSION INTRAVENOUS at 07:05

## 2023-10-10 RX ADMIN — ONDANSETRON 4 MG: 2 INJECTION INTRAMUSCULAR; INTRAVENOUS at 07:15

## 2023-10-10 RX ADMIN — SODIUM CHLORIDE: 9 INJECTION, SOLUTION INTRAVENOUS at 06:29

## 2023-10-10 RX ADMIN — LIDOCAINE HYDROCHLORIDE 80 MG: 20 INJECTION, SOLUTION EPIDURAL; INFILTRATION; INTRACAUDAL; PERINEURAL at 07:05

## 2023-10-10 RX ADMIN — ROCURONIUM BROMIDE 50 MG: 10 INJECTION, SOLUTION INTRAVENOUS at 07:05

## 2023-10-10 RX ADMIN — SUGAMMADEX 200 MG: 100 INJECTION, SOLUTION INTRAVENOUS at 07:22

## 2023-10-10 RX ADMIN — LIDOCAINE HYDROCHLORIDE 1 ML: 10 INJECTION, SOLUTION EPIDURAL; INFILTRATION; INTRACAUDAL; PERINEURAL at 06:29

## 2023-10-10 ASSESSMENT — ENCOUNTER SYMPTOMS
GASTROINTESTINAL NEGATIVE: 1
BACK PAIN: 1
RESPIRATORY NEGATIVE: 1
SINUS PAIN: 1

## 2023-10-10 ASSESSMENT — PAIN - FUNCTIONAL ASSESSMENT: PAIN_FUNCTIONAL_ASSESSMENT: 0-10

## 2023-10-10 NOTE — OP NOTE
Patient: Evert Smith  YOB: 1950  MRN: [id number]    Date of Procedure: 10/10/2023  PROCEDURE NOTE    DATE OF PROCEDURE: 10/10/2023    SURGEON: Manish Monsivais MD    ASSISTANT: None    PREOPERATIVE DIAGNOSIS: History of colon polyp    POSTOPERATIVE DIAGNOSIS: Poor prep. Prominent external hemorrhoids. Rectosigmoid polyp. Sigmoid diverticulosis. OPERATION: Total colonoscopy to cecum with intubation of terminal ileum. Rectosigmoid polypectomy with large cold biopsy forceps. ANESTHESIA: General    ESTIMATED BLOOD LOSS: None    COMPLICATIONS: None     SPECIMENS:  Was Obtained:     HISTORY: The patient is a 75y.o. year old female with history of above preop diagnosis. I recommended colonoscopy with possible biopsy or polypectomy and I explained the risk, benefits, expected outcome, and alternatives to the procedure. Risks included but are not limited to bleeding, infection, respiratory distress, hypotension, and perforation of the colon and possibility of missing a lesion. The patient understands and is in agreement. PROCEDURE: The patient was given IV conscious sedation. The patient's SPO2 remained above 90% throughout the procedure. Digital rectal exam was normal.  The colonoscope was inserted through the anus into the rectum and advanced under direct vision to the cecum without difficulty. Terminal ileum was examined for approximately 2 inches. The prep was poor. Findings:  Terminal ileum: normal    Cecum/Ascending colon: Poor prep grossly unremarkable somewhat limited visualization    Transverse colon: Diverticulosis. Poor prep. Somewhat limited visualization    Descending/Sigmoid colon: abnormal: Sigmoid diverticulosis. Rectosigmoid polyp removed with large cold biopsy forceps. Poor prep with somewhat limited visualization. Rectum/Anus: examined in normal and retroflexed positions and was abnormal: Prominent external hemorrhoids. Poor prep.   Solid stool in the

## 2023-10-10 NOTE — H&P
HISTORY and [address]       NAME:  Sania Staples  MRN: [id number]   YOB: 1950   Date: 10/10/2023   Age: 75 y.o. Gender: female       COMPLAINT AND PRESENT HISTORY:     Sania Stalpes is 75 y.o.,  female, presents for COLONOSCOPY DIAGNOSTIC   Primary dx: History of diverticulosis [Z87.19]. HPI:  Sania Staples is 75 y.o.,   female, having a Diagnostic Colonoscopy. Prior Colonoscopy was done 2018  with polyp removed. Patient has hx of Colon Polyps and  Diverticulosis. Patient denies any  FH of Colon Cancer. Patient reports no changes in bowel habits. No GI /Rectal bleeding, experiencing red/ black/ BRBPR stools. Patient has no  history of abd. pain . No N/V, no abdominal bloating, no r weight loss. Patient denies any Dysphagia. Pt has hx of GERD, Pt is on a PPI./ Antacid, that is helping to control symptoms. Pt denies fever/chills, chest pain or SOB      Review of additional significant medical hx:  (See chart for additional detail, including current medications /see ROS for current S/S):     HTN, HLD  Current medication r/t condition :LISINOPRIL METOPROLOL, NORVASC, ZOCOR, ASA    BP Readings from Last 3 Encounters:   10/24/22 101/66   08/02/21 122/70   07/27/20 128/60     DMII  Current medication r/t condition: METFORMIN     Hemoglobin A1C   Date Value Ref Range Status   05/02/2019 5.7 4.0 - 6.0 % Final         NPO status: pt NPO since the past midnight   Medications taken TODAY (with sip of water): pt took her BP medication today with sip of water  Anticoagulation status: PT STOPPED TAKING ASA ONE WEEK AGO  Prep fully completed: YES. Pt reports her last BM is liquid   Denies personal hx of blood clots. Denies personal hx of MRSA infection. Denies any personal or family hx of previous complications w/anesthesia.  PONV     PAST MEDICAL HISTORY     Past Medical History:   Diagnosis Date    Abnormal Pap smear of cervix     Anemia

## 2023-10-11 LAB — SURGICAL PATHOLOGY REPORT: NORMAL

## 2023-11-08 DIAGNOSIS — N89.8 VAGINAL ITCHING: ICD-10-CM

## 2023-11-08 DIAGNOSIS — N90.89 VULVAR LESION: ICD-10-CM

## 2023-11-08 DIAGNOSIS — N90.4 LICHEN SCLEROSUS OF FEMALE GENITALIA: ICD-10-CM

## 2023-11-10 RX ORDER — CLOBETASOL PROPIONATE 0.5 MG/G
CREAM TOPICAL
Qty: 15 G | Refills: 5 | Status: SHIPPED | OUTPATIENT
Start: 2023-11-10

## 2023-11-13 ENCOUNTER — HOSPITAL ENCOUNTER (OUTPATIENT)
Age: 73
Setting detail: SPECIMEN
Discharge: HOME | End: 2023-11-13

## 2023-11-13 ENCOUNTER — OFFICE VISIT (OUTPATIENT)
Dept: OBGYN CLINIC | Age: 73
End: 2023-11-13
Payer: MEDICARE

## 2023-11-13 VITALS
BODY MASS INDEX: 42.51 KG/M2 | DIASTOLIC BLOOD PRESSURE: 70 MMHG | SYSTOLIC BLOOD PRESSURE: 122 MMHG | HEIGHT: 62 IN | WEIGHT: 231 LBS

## 2023-11-13 DIAGNOSIS — Z13.820 SCREENING FOR OSTEOPOROSIS: ICD-10-CM

## 2023-11-13 DIAGNOSIS — N95.1 MENOPAUSAL STATE: ICD-10-CM

## 2023-11-13 DIAGNOSIS — Z91.89 ENCOUNTER FOR GYNECOLOGIC EXAMINATION FOR HIGH-RISK PATIENT COVERED BY MEDICARE: Primary | ICD-10-CM

## 2023-11-13 PROCEDURE — 3074F SYST BP LT 130 MM HG: CPT | Performed by: OBSTETRICS & GYNECOLOGY

## 2023-11-13 PROCEDURE — 99397 PER PM REEVAL EST PAT 65+ YR: CPT | Performed by: OBSTETRICS & GYNECOLOGY

## 2023-11-13 PROCEDURE — 3078F DIAST BP <80 MM HG: CPT | Performed by: OBSTETRICS & GYNECOLOGY

## 2023-11-13 NOTE — PROGRESS NOTES
Environmental allergies     GERD (gastroesophageal reflux disease)     Hyperlipidemia     Hypertension     OA (osteoarthritis of spine)     generalized. 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 Tobacco & Secondary smoke risks reviewed; instructed on cessation and avoidance      Counseling Completed:  Preventative Health Recommendations and Follow up. PLAN:  No follow-ups on file. No changes in health, continue current plan of care  Needs repeat colonoscopy, DEXA  Obtain mammogram records. Not sexually active, normal pap smears s/p hysterectomy, >age 74. If pap normal recommend yearly well woman exam by PCP or us and appt in office minimum every 2 years for med refills. Repeat Annual every 1 year  Cervical Cytology Evaluation begins at 24years old. If Negative Cytology, Follow-up screening per current guidelines. Mammograms every 1 year. If 37 yo and last mammogram was negative. Calcium and Vitamin D dosing reviewed. Colonoscopy screening reviewed as well as onset for bone density testing. STD counseling and prevention reviewed. Routine health maintenance per patients PCP.   Orders Placed This Encounter   Procedures    DEXA BONE DENSITY 2 SITES     Standing Status:   Future     Standing Expiration Date:   11/13/2024    PAP Smear

## 2023-11-15 LAB
HPV I/H RISK 4 DNA CVX QL NAA+PROBE: NOT DETECTED
HPV SAMPLE: NORMAL
HPV, INTERPRETATION: NORMAL
HPV16 DNA CVX QL NAA+PROBE: NOT DETECTED
HPV18 DNA CVX QL NAA+PROBE: NOT DETECTED
SPECIMEN DESCRIPTION: NORMAL

## 2023-11-16 LAB — CYTOLOGY REPORT: NORMAL

## 2023-11-20 DIAGNOSIS — Z13.820 SCREENING FOR OSTEOPOROSIS: Primary | ICD-10-CM

## 2023-11-27 ENCOUNTER — TRANSCRIBE ORDERS (OUTPATIENT)
Dept: ADMINISTRATIVE | Age: 73
End: 2023-11-27

## 2023-11-27 DIAGNOSIS — Z78.0 POST-MENOPAUSAL: Primary | ICD-10-CM

## 2023-11-27 DIAGNOSIS — Z13.820 OSTEOPOROSIS SCREENING: Primary | ICD-10-CM

## 2023-11-27 DIAGNOSIS — Z78.0 POST-MENOPAUSAL: ICD-10-CM

## 2023-11-27 DIAGNOSIS — Z13.820 SCREENING FOR OSTEOPOROSIS: ICD-10-CM

## 2023-12-27 ENCOUNTER — HOSPITAL ENCOUNTER (OUTPATIENT)
Dept: PREADMISSION TESTING | Age: 73
Discharge: HOME | End: 2023-12-31

## 2023-12-27 VITALS — WEIGHT: 230 LBS | BODY MASS INDEX: 42.33 KG/M2 | HEIGHT: 62 IN

## 2023-12-27 NOTE — PROGRESS NOTES
Pre-op Instructions For Out-Patient Endoscopy Surgery    Medication Instructions:  Please stop herbs and any supplements now (includes vitamins and minerals).    Please contact your surgeon and prescribing physician for pre-op instructions for any blood thinners. Aspirin and Meloxicam     If you have inhalers/aerosol treatments at home, please use them the morning of your surgery and bring the inhalers with you to the hospital.    Please take the following medications the morning of your surgery with a sip of water:    Inhaler, Levothyroxine, Amlodipine, and Metoprolol     Surgery Instructions:  After midnight before surgery:  Do not eat or drink anything, including water, mints, gum, and hard candy.  You may brush your teeth without swallowing.  No smoking, chewing tobacco, or street drugs.    Please shower or bathe before surgery.       Please do not wear any cologne, lotion, powder, jewelry, piercings, perfume, makeup, nail polish, hair accessories, or hair spray on the day of surgery.  Wear loose comfortable clothing.    Leave your valuables at home but bring a payment source for any after-surgery prescriptions you plan to fill at [facility].  Bring a storage case for any glasses/contacts.    An adult who is responsible for you MUST drive you home and should be with you for the first 24 hours after surgery.     The Day of Surgery:  Arrive at [facility] Surgery Entrance at the time directed by your surgeon and check in at the desk.     If you have a living will or healthcare power of , please bring a copy.    You will be taken to the pre-op holding area where you will be prepared for surgery.  A physical assessment will be performed by a nurse practitioner or house officer.  Your IV will be started and you will meet your anesthesiologist.    When you go to surgery, your family will be directed to the surgical waiting room, where the doctor should speak with them after your

## 2024-01-07 ENCOUNTER — APPOINTMENT (OUTPATIENT)
Dept: GENERAL RADIOLOGY | Age: 74
DRG: 643 | End: 2024-01-07
Payer: MEDICARE

## 2024-01-07 ENCOUNTER — HOSPITAL ENCOUNTER (INPATIENT)
Age: 74
LOS: 2 days | Discharge: HOME | DRG: 643 | End: 2024-01-09
Attending: EMERGENCY MEDICINE | Admitting: INTERNAL MEDICINE
Payer: MEDICARE

## 2024-01-07 DIAGNOSIS — E87.1 HYPONATREMIA: ICD-10-CM

## 2024-01-07 DIAGNOSIS — U07.1 COVID-19 VIRUS INFECTION: Primary | ICD-10-CM

## 2024-01-07 LAB
ALBUMIN SERPL-MCNC: 4 G/DL (ref 3.5–5.2)
ALP SERPL-CCNC: 98 U/L (ref 35–104)
ALT SERPL-CCNC: 12 U/L (ref 5–33)
ANION GAP SERPL CALCULATED.3IONS-SCNC: 13 MMOL/L (ref 9–17)
AST SERPL-CCNC: 20 U/L
BASOPHILS # BLD: 0.1 K/UL (ref 0–0.2)
BASOPHILS NFR BLD: 1 % (ref 0–2)
BILIRUB SERPL-MCNC: 0.3 MG/DL (ref 0.3–1.2)
BILIRUB UR QL STRIP: NEGATIVE
BUN SERPL-MCNC: 11 MG/DL (ref 8–23)
CALCIUM SERPL-MCNC: 9.1 MG/DL (ref 8.6–10.4)
CHLORIDE SERPL-SCNC: 89 MMOL/L (ref 98–107)
CLARITY UR: CLEAR
CO2 SERPL-SCNC: 21 MMOL/L (ref 20–31)
COLOR UR: YELLOW
COMMENT: ABNORMAL
CREAT SERPL-MCNC: 0.7 MG/DL (ref 0.5–0.9)
EOSINOPHIL # BLD: 0.2 K/UL (ref 0–0.4)
EOSINOPHILS RELATIVE PERCENT: 3 % (ref 0–4)
ERYTHROCYTE [DISTWIDTH] IN BLOOD BY AUTOMATED COUNT: 13.5 % (ref 11.5–14.9)
FLUAV RNA RESP QL NAA+PROBE: NOT DETECTED
FLUBV RNA RESP QL NAA+PROBE: NOT DETECTED
GFR SERPL CREATININE-BSD FRML MDRD: >60 ML/MIN/1.73M2
GLUCOSE BLD-MCNC: 115 MG/DL (ref 65–105)
GLUCOSE SERPL-MCNC: 136 MG/DL (ref 70–99)
GLUCOSE UR STRIP-MCNC: NEGATIVE MG/DL
HCT VFR BLD AUTO: 32.5 % (ref 36–46)
HGB BLD-MCNC: 10.6 G/DL (ref 12–16)
HGB UR QL STRIP.AUTO: NEGATIVE
KETONES UR STRIP-MCNC: ABNORMAL MG/DL
LEUKOCYTE ESTERASE UR QL STRIP: NEGATIVE
LIPASE SERPL-CCNC: 37 U/L (ref 13–60)
LYMPHOCYTES NFR BLD: 0.6 K/UL (ref 1–4.8)
LYMPHOCYTES RELATIVE PERCENT: 9 % (ref 24–44)
MCH RBC QN AUTO: 30.3 PG (ref 26–34)
MCHC RBC AUTO-ENTMCNC: 32.4 G/DL (ref 31–37)
MCV RBC AUTO: 93.5 FL (ref 80–100)
MONOCYTES NFR BLD: 0.8 K/UL (ref 0.1–1.3)
MONOCYTES NFR BLD: 13 % (ref 1–7)
NEUTROPHILS NFR BLD: 74 % (ref 36–66)
NEUTS SEG NFR BLD: 4.6 K/UL (ref 1.3–9.1)
NITRITE UR QL STRIP: NEGATIVE
OSMOLALITY UR: 606 MOSM/KG (ref 80–1300)
PH UR STRIP: 7.5 [PH] (ref 5–8)
PLATELET # BLD AUTO: 358 K/UL (ref 150–450)
PMV BLD AUTO: 6.4 FL (ref 6–12)
POTASSIUM SERPL-SCNC: 4.6 MMOL/L (ref 3.7–5.3)
PROT SERPL-MCNC: 7.5 G/DL (ref 6.4–8.3)
PROT UR STRIP-MCNC: NEGATIVE MG/DL
RBC # BLD AUTO: 3.48 M/UL (ref 4–5.2)
SARS-COV-2 RNA RESP QL NAA+PROBE: DETECTED
SODIUM SERPL-SCNC: 123 MMOL/L (ref 135–144)
SODIUM UR-SCNC: 170 MMOL/L
SOURCE: ABNORMAL
SP GR UR STRIP: 1.01 (ref 1–1.03)
SPECIMEN DESCRIPTION: ABNORMAL
T4 FREE SERPL-MCNC: 1.3 NG/DL (ref 0.9–1.7)
TSH SERPL DL<=0.05 MIU/L-ACNC: 0.84 UIU/ML (ref 0.3–5)
UROBILINOGEN UR STRIP-ACNC: NORMAL EU/DL (ref 0–1)
WBC OTHER # BLD: 6.2 K/UL (ref 3.5–11)

## 2024-01-07 PROCEDURE — 84300 ASSAY OF URINE SODIUM: CPT

## 2024-01-07 PROCEDURE — 6370000000 HC RX 637 (ALT 250 FOR IP): Performed by: INTERNAL MEDICINE

## 2024-01-07 PROCEDURE — 96374 THER/PROPH/DIAG INJ IV PUSH: CPT

## 2024-01-07 PROCEDURE — 84439 ASSAY OF FREE THYROXINE: CPT

## 2024-01-07 PROCEDURE — 99285 EMERGENCY DEPT VISIT HI MDM: CPT

## 2024-01-07 PROCEDURE — 82947 ASSAY GLUCOSE BLOOD QUANT: CPT

## 2024-01-07 PROCEDURE — 94664 DEMO&/EVAL PT USE INHALER: CPT

## 2024-01-07 PROCEDURE — 81003 URINALYSIS AUTO W/O SCOPE: CPT

## 2024-01-07 PROCEDURE — 6360000002 HC RX W HCPCS: Performed by: INTERNAL MEDICINE

## 2024-01-07 PROCEDURE — 6370000000 HC RX 637 (ALT 250 FOR IP): Performed by: EMERGENCY MEDICINE

## 2024-01-07 PROCEDURE — 94761 N-INVAS EAR/PLS OXIMETRY MLT: CPT

## 2024-01-07 PROCEDURE — 36415 COLL VENOUS BLD VENIPUNCTURE: CPT

## 2024-01-07 PROCEDURE — 83690 ASSAY OF LIPASE: CPT

## 2024-01-07 PROCEDURE — 99223 1ST HOSP IP/OBS HIGH 75: CPT | Performed by: INTERNAL MEDICINE

## 2024-01-07 PROCEDURE — 85025 COMPLETE CBC W/AUTO DIFF WBC: CPT

## 2024-01-07 PROCEDURE — 71045 X-RAY EXAM CHEST 1 VIEW: CPT

## 2024-01-07 PROCEDURE — 84443 ASSAY THYROID STIM HORMONE: CPT

## 2024-01-07 PROCEDURE — 2580000003 HC RX 258: Performed by: INTERNAL MEDICINE

## 2024-01-07 PROCEDURE — 83935 ASSAY OF URINE OSMOLALITY: CPT

## 2024-01-07 PROCEDURE — 2060000000 HC ICU INTERMEDIATE R&B

## 2024-01-07 PROCEDURE — 80053 COMPREHEN METABOLIC PANEL: CPT

## 2024-01-07 PROCEDURE — 6370000000 HC RX 637 (ALT 250 FOR IP): Performed by: NURSE PRACTITIONER

## 2024-01-07 PROCEDURE — 94640 AIRWAY INHALATION TREATMENT: CPT

## 2024-01-07 PROCEDURE — 87636 SARSCOV2 & INF A&B AMP PRB: CPT

## 2024-01-07 PROCEDURE — 6360000002 HC RX W HCPCS: Performed by: EMERGENCY MEDICINE

## 2024-01-07 PROCEDURE — 2580000003 HC RX 258: Performed by: EMERGENCY MEDICINE

## 2024-01-07 PROCEDURE — 96361 HYDRATE IV INFUSION ADD-ON: CPT

## 2024-01-07 RX ORDER — BENZONATATE 200 MG/1
200 CAPSULE ORAL 3 TIMES DAILY PRN
Status: DISCONTINUED | OUTPATIENT
Start: 2024-01-07 | End: 2024-01-09 | Stop reason: HOSPADM

## 2024-01-07 RX ORDER — ATORVASTATIN CALCIUM 40 MG/1
40 TABLET, FILM COATED ORAL DAILY
Status: DISCONTINUED | OUTPATIENT
Start: 2024-01-07 | End: 2024-01-09 | Stop reason: HOSPADM

## 2024-01-07 RX ORDER — LANOLIN ALCOHOL/MO/W.PET/CERES
1000 CREAM (GRAM) TOPICAL DAILY
Status: DISCONTINUED | OUTPATIENT
Start: 2024-01-07 | End: 2024-01-09 | Stop reason: HOSPADM

## 2024-01-07 RX ORDER — ONDANSETRON 2 MG/ML
4 INJECTION INTRAMUSCULAR; INTRAVENOUS EVERY 6 HOURS PRN
Status: DISCONTINUED | OUTPATIENT
Start: 2024-01-07 | End: 2024-01-09 | Stop reason: HOSPADM

## 2024-01-07 RX ORDER — 0.9 % SODIUM CHLORIDE 0.9 %
1000 INTRAVENOUS SOLUTION INTRAVENOUS ONCE
Status: COMPLETED | OUTPATIENT
Start: 2024-01-07 | End: 2024-01-07

## 2024-01-07 RX ORDER — SODIUM CHLORIDE 0.9 % (FLUSH) 0.9 %
5-40 SYRINGE (ML) INJECTION PRN
Status: DISCONTINUED | OUTPATIENT
Start: 2024-01-07 | End: 2024-01-09 | Stop reason: HOSPADM

## 2024-01-07 RX ORDER — CLOBETASOL PROPIONATE 0.5 MG/G
CREAM TOPICAL 2 TIMES DAILY
Status: DISCONTINUED | OUTPATIENT
Start: 2024-01-07 | End: 2024-01-09 | Stop reason: HOSPADM

## 2024-01-07 RX ORDER — ASPIRIN 81 MG/1
81 TABLET ORAL DAILY
Status: DISCONTINUED | OUTPATIENT
Start: 2024-01-07 | End: 2024-01-09 | Stop reason: HOSPADM

## 2024-01-07 RX ORDER — METOPROLOL SUCCINATE 50 MG/1
50 TABLET, EXTENDED RELEASE ORAL DAILY
Status: DISCONTINUED | OUTPATIENT
Start: 2024-01-07 | End: 2024-01-09 | Stop reason: HOSPADM

## 2024-01-07 RX ORDER — INSULIN LISPRO 100 [IU]/ML
0-4 INJECTION, SOLUTION INTRAVENOUS; SUBCUTANEOUS NIGHTLY
Status: DISCONTINUED | OUTPATIENT
Start: 2024-01-07 | End: 2024-01-09 | Stop reason: HOSPADM

## 2024-01-07 RX ORDER — MAGNESIUM SULFATE HEPTAHYDRATE 40 MG/ML
2000 INJECTION, SOLUTION INTRAVENOUS PRN
Status: DISCONTINUED | OUTPATIENT
Start: 2024-01-07 | End: 2024-01-09 | Stop reason: HOSPADM

## 2024-01-07 RX ORDER — MECLIZINE HYDROCHLORIDE 25 MG/1
25 TABLET ORAL ONCE
Status: COMPLETED | OUTPATIENT
Start: 2024-01-07 | End: 2024-01-07

## 2024-01-07 RX ORDER — ASCORBIC ACID 500 MG
500 TABLET ORAL DAILY
Status: DISCONTINUED | OUTPATIENT
Start: 2024-01-07 | End: 2024-01-09 | Stop reason: HOSPADM

## 2024-01-07 RX ORDER — DEXTROMETHORPHAN POLISTIREX 30 MG/5ML
60 SUSPENSION ORAL EVERY 12 HOURS PRN
Status: DISCONTINUED | OUTPATIENT
Start: 2024-01-07 | End: 2024-01-09 | Stop reason: HOSPADM

## 2024-01-07 RX ORDER — ONDANSETRON 4 MG/1
4 TABLET, ORALLY DISINTEGRATING ORAL EVERY 8 HOURS PRN
Status: DISCONTINUED | OUTPATIENT
Start: 2024-01-07 | End: 2024-01-09 | Stop reason: HOSPADM

## 2024-01-07 RX ORDER — ONDANSETRON 2 MG/ML
4 INJECTION INTRAMUSCULAR; INTRAVENOUS ONCE
Status: COMPLETED | OUTPATIENT
Start: 2024-01-07 | End: 2024-01-07

## 2024-01-07 RX ORDER — INSULIN LISPRO 100 [IU]/ML
0-4 INJECTION, SOLUTION INTRAVENOUS; SUBCUTANEOUS
Status: DISCONTINUED | OUTPATIENT
Start: 2024-01-07 | End: 2024-01-09 | Stop reason: HOSPADM

## 2024-01-07 RX ORDER — FERROUS SULFATE 325(65) MG
325 TABLET ORAL 2 TIMES DAILY
Status: DISCONTINUED | OUTPATIENT
Start: 2024-01-07 | End: 2024-01-09 | Stop reason: HOSPADM

## 2024-01-07 RX ORDER — LEVOTHYROXINE SODIUM 0.07 MG/1
75 TABLET ORAL DAILY
Status: DISCONTINUED | OUTPATIENT
Start: 2024-01-07 | End: 2024-01-09 | Stop reason: HOSPADM

## 2024-01-07 RX ORDER — SODIUM CHLORIDE 9 MG/ML
INJECTION, SOLUTION INTRAVENOUS PRN
Status: DISCONTINUED | OUTPATIENT
Start: 2024-01-07 | End: 2024-01-09 | Stop reason: HOSPADM

## 2024-01-07 RX ORDER — ALBUTEROL SULFATE 90 UG/1
2 AEROSOL, METERED RESPIRATORY (INHALATION)
Status: DISCONTINUED | OUTPATIENT
Start: 2024-01-07 | End: 2024-01-09 | Stop reason: HOSPADM

## 2024-01-07 RX ORDER — LISINOPRIL 20 MG/1
20 TABLET ORAL DAILY
Status: DISCONTINUED | OUTPATIENT
Start: 2024-01-07 | End: 2024-01-09 | Stop reason: HOSPADM

## 2024-01-07 RX ORDER — POTASSIUM CHLORIDE 20 MEQ/1
40 TABLET, EXTENDED RELEASE ORAL PRN
Status: DISCONTINUED | OUTPATIENT
Start: 2024-01-07 | End: 2024-01-09 | Stop reason: HOSPADM

## 2024-01-07 RX ORDER — ACETAMINOPHEN 325 MG/1
650 TABLET ORAL EVERY 6 HOURS PRN
Status: DISCONTINUED | OUTPATIENT
Start: 2024-01-07 | End: 2024-01-09 | Stop reason: HOSPADM

## 2024-01-07 RX ORDER — ACETAMINOPHEN 650 MG/1
650 SUPPOSITORY RECTAL EVERY 6 HOURS PRN
Status: DISCONTINUED | OUTPATIENT
Start: 2024-01-07 | End: 2024-01-09 | Stop reason: HOSPADM

## 2024-01-07 RX ORDER — LANOLIN ALCOHOL/MO/W.PET/CERES
3 CREAM (GRAM) TOPICAL NIGHTLY PRN
Status: DISCONTINUED | OUTPATIENT
Start: 2024-01-07 | End: 2024-01-09 | Stop reason: HOSPADM

## 2024-01-07 RX ORDER — SODIUM CHLORIDE 9 MG/ML
INJECTION, SOLUTION INTRAVENOUS CONTINUOUS
Status: DISCONTINUED | OUTPATIENT
Start: 2024-01-07 | End: 2024-01-08

## 2024-01-07 RX ORDER — AMLODIPINE BESYLATE 2.5 MG/1
2.5 TABLET ORAL DAILY
Status: DISCONTINUED | OUTPATIENT
Start: 2024-01-07 | End: 2024-01-09 | Stop reason: HOSPADM

## 2024-01-07 RX ORDER — SODIUM CHLORIDE 0.9 % (FLUSH) 0.9 %
5-40 SYRINGE (ML) INJECTION EVERY 12 HOURS SCHEDULED
Status: DISCONTINUED | OUTPATIENT
Start: 2024-01-07 | End: 2024-01-09 | Stop reason: HOSPADM

## 2024-01-07 RX ORDER — POTASSIUM CHLORIDE 7.45 MG/ML
10 INJECTION INTRAVENOUS PRN
Status: DISCONTINUED | OUTPATIENT
Start: 2024-01-07 | End: 2024-01-09 | Stop reason: HOSPADM

## 2024-01-07 RX ORDER — POLYETHYLENE GLYCOL 3350 17 G/17G
17 POWDER, FOR SOLUTION ORAL DAILY PRN
Status: DISCONTINUED | OUTPATIENT
Start: 2024-01-07 | End: 2024-01-09 | Stop reason: HOSPADM

## 2024-01-07 RX ORDER — DEXTROSE MONOHYDRATE 100 MG/ML
INJECTION, SOLUTION INTRAVENOUS CONTINUOUS PRN
Status: DISCONTINUED | OUTPATIENT
Start: 2024-01-07 | End: 2024-01-09 | Stop reason: HOSPADM

## 2024-01-07 RX ORDER — ENOXAPARIN SODIUM 100 MG/ML
30 INJECTION SUBCUTANEOUS 2 TIMES DAILY
Status: DISCONTINUED | OUTPATIENT
Start: 2024-01-07 | End: 2024-01-09 | Stop reason: HOSPADM

## 2024-01-07 RX ORDER — PANTOPRAZOLE SODIUM 40 MG/1
40 TABLET, DELAYED RELEASE ORAL
Status: DISCONTINUED | OUTPATIENT
Start: 2024-01-08 | End: 2024-01-09 | Stop reason: HOSPADM

## 2024-01-07 RX ADMIN — ALBUTEROL SULFATE 2 PUFF: 90 AEROSOL, METERED RESPIRATORY (INHALATION) at 20:34

## 2024-01-07 RX ADMIN — ONDANSETRON 4 MG: 2 INJECTION INTRAMUSCULAR; INTRAVENOUS at 18:31

## 2024-01-07 RX ADMIN — BENZONATATE 200 MG: 200 CAPSULE ORAL at 23:20

## 2024-01-07 RX ADMIN — FERROUS SULFATE TAB 325 MG (65 MG ELEMENTAL FE) 325 MG: 325 (65 FE) TAB at 20:58

## 2024-01-07 RX ADMIN — ENOXAPARIN SODIUM 30 MG: 100 INJECTION SUBCUTANEOUS at 20:58

## 2024-01-07 RX ADMIN — SODIUM CHLORIDE 1000 ML: 9 INJECTION, SOLUTION INTRAVENOUS at 14:45

## 2024-01-07 RX ADMIN — MECLIZINE HYDROCHLORIDE 25 MG: 25 TABLET ORAL at 14:50

## 2024-01-07 RX ADMIN — ACETAMINOPHEN 650 MG: 325 TABLET ORAL at 21:49

## 2024-01-07 RX ADMIN — Medication 3 MG: at 23:20

## 2024-01-07 RX ADMIN — ONDANSETRON 4 MG: 2 INJECTION INTRAMUSCULAR; INTRAVENOUS at 14:45

## 2024-01-07 RX ADMIN — SODIUM CHLORIDE: 9 INJECTION, SOLUTION INTRAVENOUS at 16:08

## 2024-01-07 RX ADMIN — CLOBETASOL PROPIONATE CREAM USP, 0.05%: 0.5 CREAM TOPICAL at 20:57

## 2024-01-07 ASSESSMENT — ENCOUNTER SYMPTOMS
COLOR CHANGE: 0
ABDOMINAL PAIN: 1
EYE PAIN: 0
CHEST TIGHTNESS: 0
EYE DISCHARGE: 0
RHINORRHEA: 0
WHEEZING: 0
CONSTIPATION: 0
EYE REDNESS: 0
TROUBLE SWALLOWING: 0
DIARRHEA: 1
VOMITING: 0
FACIAL SWELLING: 0
SHORTNESS OF BREATH: 0
BLOOD IN STOOL: 0
NAUSEA: 1
SINUS PRESSURE: 0
SORE THROAT: 0
BACK PAIN: 0
COUGH: 1

## 2024-01-07 ASSESSMENT — PAIN - FUNCTIONAL ASSESSMENT: PAIN_FUNCTIONAL_ASSESSMENT: NONE - DENIES PAIN

## 2024-01-07 ASSESSMENT — LIFESTYLE VARIABLES
HOW MANY STANDARD DRINKS CONTAINING ALCOHOL DO YOU HAVE ON A TYPICAL DAY: PATIENT DOES NOT DRINK
HOW OFTEN DO YOU HAVE A DRINK CONTAINING ALCOHOL: NEVER

## 2024-01-07 ASSESSMENT — PAIN SCALES - GENERAL
PAINLEVEL_OUTOF10: 3
PAINLEVEL_OUTOF10: 0

## 2024-01-07 ASSESSMENT — PAIN DESCRIPTION - LOCATION: LOCATION: HEAD

## 2024-01-07 ASSESSMENT — PAIN DESCRIPTION - DESCRIPTORS: DESCRIPTORS: ACHING

## 2024-01-07 NOTE — H&P
[facility]   IN-PATIENT SERVICE   [facility]    HISTORY AND PHYSICAL EXAMINATION            Date:   1/7/2024  Patient name:  Genoveva Damon  Date of admission:  1/7/2024  2:29 PM  MRN:   [id number]  Account:  [id number]  YOB: 1950  PCP:    Bnejy Chakraborty Jr., MD  Room:   01/01  Code Status:    Full Code    Chief Complaint:     Chief Complaint   Patient presents with    Fever    Nausea & Vomiting    Dizziness     Described as feeling lightheaded.    Diarrhea       History Obtained From:     patient, electronic medical record    History of Present Illness:     The patient is a 73 y.o.  Non- / non  female who presents with Fever, Nausea & Vomiting, Dizziness (Described as feeling lightheaded.), and Diarrhea   and she is admitted to the hospital for the management of hyponatremia, COVID  Patient past medical multimedical problem which include diabetes, hypertension, hyperlipidemia, hypothyroidism, history of hyponatremia in the past,  Patient is not feeling very well since Friday, she is throwing up, has 5-6 vomitus in a day, no complaint of blood in the vomitus  Symptom history of loose stools  Patient also complained of cough, nasal congestion she thought that this is her regular allergies  In the emergency room patient, patient was found positive for COVID-19 infection, also found to have hyponatremia of 123        Past Medical History:     Past Medical History:   Diagnosis Date    Abnormal Pap smear of cervix     Anemia     Small's esophagus     Chronic back pain     Chronic sinusitis     Diabetes mellitus (HCC)     Environmental allergies     GERD (gastroesophageal reflux disease)     Hyperlipidemia     Hypertension     OA (osteoarthritis of spine)     generalized.    PONV (postoperative nausea and vomiting)     Thyroid disease     Wears glasses         Past Surgical History:     Past Surgical History:   Procedure Laterality Date    APPENDECTOMY

## 2024-01-07 NOTE — ED PROVIDER NOTES
[facility]  eMERGENCY dEPARTMENT eNCOUnter      Pt Name: Genoveva Damon  MRN: [id number]  Birthdate 1950  Date of evaluation: 1/7/24      CHIEF COMPLAINT       Chief Complaint   Patient presents with    Fever    Nausea & Vomiting    Dizziness     Described as feeling lightheaded.    Diarrhea         HISTORY OF PRESENT ILLNESS    Genoveva Damon is a 73 y.o. female who presents complaining of illness.  Patient states for the last 2 days she has not been feeling well.  Patient's had some congestion cough productive of yellow sputum.  Patient states that she has had some fevers and chills.  Patient states she is also been feeling nauseous with a little bit diarrhea and upset stomach.  Patient states she thinks that it is her sodium being low which she has had problems with in the past.  Patient is supposed to be having a colonoscopy on Tuesday sweets really only been drinking water and vegetables.  Patient states she has not been around anybody that is been sick.      REVIEW OF SYSTEMS       Review of Systems   Constitutional:  Positive for chills and fever. Negative for activity change, appetite change and diaphoresis.   HENT:  Positive for congestion. Negative for ear pain, facial swelling, nosebleeds, rhinorrhea, sinus pressure, sore throat and trouble swallowing.    Eyes:  Negative for pain, discharge and redness.   Respiratory:  Positive for cough. Negative for chest tightness, shortness of breath and wheezing.    Cardiovascular:  Negative for chest pain, palpitations and leg swelling.   Gastrointestinal:  Positive for abdominal pain, diarrhea and nausea. Negative for blood in stool, constipation and vomiting.   Genitourinary:  Negative for difficulty urinating, dysuria, flank pain, frequency, genital sores and hematuria.   Musculoskeletal:  Negative for arthralgias, back pain, gait problem, joint swelling, myalgias and neck pain.   Skin:  Negative for color change, pallor, rash and wound.   [facility]   [facility]

## 2024-01-07 NOTE — ED NOTES
Report given to NAHOMI Yusuf from PCU.   Report method by phone   The following was reviewed with receiving RN:   Current vital signs:  /85   Pulse 88   Temp 97.8 °F (36.6 °C) (Axillary)   Resp 15   Ht 1.575 m (5' 2\")   Wt 104.3 kg (230 lb)   SpO2 96%   BMI 42.07 kg/m²                MEWS Score: 2     Any medication or safety alerts were reviewed. Any pending diagnostics and notifications were also reviewed, as well as any safety concerns or issues, abnormal labs, abnormal imaging, and abnormal assessment findings. Questions were answered.

## 2024-01-08 LAB
ALBUMIN SERPL-MCNC: 3.4 G/DL (ref 3.5–5.2)
ANION GAP SERPL CALCULATED.3IONS-SCNC: 11 MMOL/L (ref 9–17)
ANION GAP SERPL CALCULATED.3IONS-SCNC: 11 MMOL/L (ref 9–17)
BASOPHILS # BLD: 0 K/UL (ref 0–0.2)
BASOPHILS NFR BLD: 0 % (ref 0–2)
BUN SERPL-MCNC: 12 MG/DL (ref 8–23)
BUN SERPL-MCNC: 14 MG/DL (ref 8–23)
CALCIUM SERPL-MCNC: 8.4 MG/DL (ref 8.6–10.4)
CALCIUM SERPL-MCNC: 8.7 MG/DL (ref 8.6–10.4)
CHLORIDE SERPL-SCNC: 90 MMOL/L (ref 98–107)
CHLORIDE SERPL-SCNC: 93 MMOL/L (ref 98–107)
CO2 SERPL-SCNC: 21 MMOL/L (ref 20–31)
CO2 SERPL-SCNC: 22 MMOL/L (ref 20–31)
CREAT SERPL-MCNC: 0.7 MG/DL (ref 0.5–0.9)
CREAT SERPL-MCNC: 0.8 MG/DL (ref 0.5–0.9)
EOSINOPHIL # BLD: 0.04 K/UL (ref 0–0.4)
EOSINOPHILS RELATIVE PERCENT: 1 % (ref 0–4)
ERYTHROCYTE [DISTWIDTH] IN BLOOD BY AUTOMATED COUNT: 13.5 % (ref 11.5–14.9)
GFR SERPL CREATININE-BSD FRML MDRD: >60 ML/MIN/1.73M2
GFR SERPL CREATININE-BSD FRML MDRD: >60 ML/MIN/1.73M2
GLUCOSE BLD-MCNC: 100 MG/DL (ref 65–105)
GLUCOSE BLD-MCNC: 139 MG/DL (ref 65–105)
GLUCOSE BLD-MCNC: 71 MG/DL (ref 65–105)
GLUCOSE BLD-MCNC: 87 MG/DL (ref 65–105)
GLUCOSE BLD-MCNC: 99 MG/DL (ref 65–105)
GLUCOSE SERPL-MCNC: 109 MG/DL (ref 70–99)
GLUCOSE SERPL-MCNC: 96 MG/DL (ref 70–99)
HCT VFR BLD AUTO: 31.2 % (ref 36–46)
HGB BLD-MCNC: 10.1 G/DL (ref 12–16)
LYMPHOCYTES NFR BLD: 1.56 K/UL (ref 1–4.8)
LYMPHOCYTES RELATIVE PERCENT: 38 % (ref 24–44)
MCH RBC QN AUTO: 30.7 PG (ref 26–34)
MCHC RBC AUTO-ENTMCNC: 32.5 G/DL (ref 31–37)
MCV RBC AUTO: 94.7 FL (ref 80–100)
MONOCYTES NFR BLD: 0.49 K/UL (ref 0.1–1.3)
MONOCYTES NFR BLD: 12 % (ref 1–7)
MORPHOLOGY: ABNORMAL
MORPHOLOGY: ABNORMAL
NEUTROPHILS NFR BLD: 49 % (ref 36–66)
NEUTS SEG NFR BLD: 2.01 K/UL (ref 1.3–9.1)
OSMOLALITY SERPL: 274 MOSM/KG (ref 275–295)
PHOSPHATE SERPL-MCNC: 3.9 MG/DL (ref 2.6–4.5)
PLATELET # BLD AUTO: 315 K/UL (ref 150–450)
PMV BLD AUTO: 6.3 FL (ref 6–12)
POTASSIUM SERPL-SCNC: 4.4 MMOL/L (ref 3.7–5.3)
POTASSIUM SERPL-SCNC: 4.4 MMOL/L (ref 3.7–5.3)
RBC # BLD AUTO: 3.3 M/UL (ref 4–5.2)
SODIUM SERPL-SCNC: 123 MMOL/L (ref 135–144)
SODIUM SERPL-SCNC: 123 MMOL/L (ref 135–144)
SODIUM SERPL-SCNC: 125 MMOL/L (ref 135–144)
SODIUM SERPL-SCNC: 129 MMOL/L (ref 135–144)
WBC OTHER # BLD: 4.1 K/UL (ref 3.5–11)

## 2024-01-08 PROCEDURE — 80069 RENAL FUNCTION PANEL: CPT

## 2024-01-08 PROCEDURE — 85025 COMPLETE CBC W/AUTO DIFF WBC: CPT

## 2024-01-08 PROCEDURE — 80048 BASIC METABOLIC PNL TOTAL CA: CPT

## 2024-01-08 PROCEDURE — 6360000002 HC RX W HCPCS: Performed by: INTERNAL MEDICINE

## 2024-01-08 PROCEDURE — 82947 ASSAY GLUCOSE BLOOD QUANT: CPT

## 2024-01-08 PROCEDURE — 84295 ASSAY OF SERUM SODIUM: CPT

## 2024-01-08 PROCEDURE — 2580000003 HC RX 258: Performed by: INTERNAL MEDICINE

## 2024-01-08 PROCEDURE — 94640 AIRWAY INHALATION TREATMENT: CPT

## 2024-01-08 PROCEDURE — 6370000000 HC RX 637 (ALT 250 FOR IP): Performed by: INTERNAL MEDICINE

## 2024-01-08 PROCEDURE — 99233 SBSQ HOSP IP/OBS HIGH 50: CPT | Performed by: INTERNAL MEDICINE

## 2024-01-08 PROCEDURE — 2060000000 HC ICU INTERMEDIATE R&B

## 2024-01-08 PROCEDURE — 83930 ASSAY OF BLOOD OSMOLALITY: CPT

## 2024-01-08 PROCEDURE — 6370000000 HC RX 637 (ALT 250 FOR IP): Performed by: NURSE PRACTITIONER

## 2024-01-08 PROCEDURE — 94761 N-INVAS EAR/PLS OXIMETRY MLT: CPT

## 2024-01-08 PROCEDURE — 36415 COLL VENOUS BLD VENIPUNCTURE: CPT

## 2024-01-08 RX ORDER — TOLVAPTAN 15 MG/1
15 TABLET ORAL ONCE
Status: COMPLETED | OUTPATIENT
Start: 2024-01-08 | End: 2024-01-08

## 2024-01-08 RX ORDER — SODIUM CHLORIDE 450 MG/100ML
INJECTION, SOLUTION INTRAVENOUS CONTINUOUS
Status: DISCONTINUED | OUTPATIENT
Start: 2024-01-08 | End: 2024-01-09

## 2024-01-08 RX ADMIN — ALBUTEROL SULFATE 2 PUFF: 90 AEROSOL, METERED RESPIRATORY (INHALATION) at 16:11

## 2024-01-08 RX ADMIN — ALBUTEROL SULFATE 2 PUFF: 90 AEROSOL, METERED RESPIRATORY (INHALATION) at 19:56

## 2024-01-08 RX ADMIN — BENZOCAINE 6 MG-MENTHOL 10 MG LOZENGES 1 LOZENGE: at 14:55

## 2024-01-08 RX ADMIN — SODIUM CHLORIDE: 4.5 INJECTION, SOLUTION INTRAVENOUS at 21:11

## 2024-01-08 RX ADMIN — ONDANSETRON 4 MG: 4 TABLET, ORALLY DISINTEGRATING ORAL at 19:35

## 2024-01-08 RX ADMIN — ATORVASTATIN CALCIUM 40 MG: 40 TABLET, FILM COATED ORAL at 08:28

## 2024-01-08 RX ADMIN — ENOXAPARIN SODIUM 30 MG: 100 INJECTION SUBCUTANEOUS at 08:28

## 2024-01-08 RX ADMIN — Medication 60 MG: at 05:49

## 2024-01-08 RX ADMIN — ACETAMINOPHEN 650 MG: 325 TABLET ORAL at 13:03

## 2024-01-08 RX ADMIN — SODIUM CHLORIDE: 9 INJECTION, SOLUTION INTRAVENOUS at 05:46

## 2024-01-08 RX ADMIN — LISINOPRIL 20 MG: 20 TABLET ORAL at 08:27

## 2024-01-08 RX ADMIN — TOLVAPTAN 15 MG: 15 TABLET ORAL at 16:41

## 2024-01-08 RX ADMIN — ALBUTEROL SULFATE 2 PUFF: 90 AEROSOL, METERED RESPIRATORY (INHALATION) at 12:35

## 2024-01-08 RX ADMIN — CLOBETASOL PROPIONATE CREAM USP, 0.05%: 0.5 CREAM TOPICAL at 23:11

## 2024-01-08 RX ADMIN — SODIUM CHLORIDE: 9 INJECTION, SOLUTION INTRAVENOUS at 19:38

## 2024-01-08 RX ADMIN — ALBUTEROL SULFATE 2 PUFF: 90 AEROSOL, METERED RESPIRATORY (INHALATION) at 09:01

## 2024-01-08 RX ADMIN — CYANOCOBALAMIN TAB 1000 MCG 1000 MCG: 1000 TAB at 08:28

## 2024-01-08 RX ADMIN — Medication 3 MG: at 21:10

## 2024-01-08 RX ADMIN — ENOXAPARIN SODIUM 30 MG: 100 INJECTION SUBCUTANEOUS at 21:10

## 2024-01-08 RX ADMIN — LEVOTHYROXINE SODIUM 75 MCG: 0.07 TABLET ORAL at 06:39

## 2024-01-08 RX ADMIN — AMLODIPINE BESYLATE 2.5 MG: 2.5 TABLET ORAL at 08:27

## 2024-01-08 RX ADMIN — METOPROLOL SUCCINATE 50 MG: 50 TABLET, EXTENDED RELEASE ORAL at 08:28

## 2024-01-08 RX ADMIN — FERROUS SULFATE TAB 325 MG (65 MG ELEMENTAL FE) 325 MG: 325 (65 FE) TAB at 08:27

## 2024-01-08 RX ADMIN — OXYCODONE HYDROCHLORIDE AND ACETAMINOPHEN 500 MG: 500 TABLET ORAL at 08:28

## 2024-01-08 RX ADMIN — CLOBETASOL PROPIONATE CREAM USP, 0.05%: 0.5 CREAM TOPICAL at 08:29

## 2024-01-08 RX ADMIN — Medication 60 MG: at 23:16

## 2024-01-08 RX ADMIN — PANTOPRAZOLE SODIUM 40 MG: 40 TABLET, DELAYED RELEASE ORAL at 06:39

## 2024-01-08 RX ADMIN — ASPIRIN 81 MG: 81 TABLET, COATED ORAL at 08:28

## 2024-01-08 ASSESSMENT — PAIN DESCRIPTION - LOCATION: LOCATION: HEAD

## 2024-01-08 ASSESSMENT — PAIN SCALES - GENERAL
PAINLEVEL_OUTOF10: 9
PAINLEVEL_OUTOF10: 5

## 2024-01-08 NOTE — CARE COORDINATION
Case Management Assessment  Initial Evaluation    Date/Time of Evaluation: 1/8/2024 4:05 PM  Assessment Completed by: Marika Damon RN    If patient is discharged prior to next notation, then this note serves as note for discharge by case management.    Patient Name: Genoveva Damon                   YOB: 1950  Diagnosis: Hyponatremia [E87.1]  COVID-19 virus infection [U07.1]                   Date / Time: 1/7/2024  2:29 PM    Patient Admission Status: Inpatient   Readmission Risk (Low < 19, Mod (19-27), High > 27): Readmission Risk Score: 8.4    Current PCP: Benjy Chakraborty Jr., MD  PCP verified by CM? Yes    Chart Reviewed: Yes      History Provided by: Patient  Patient Orientation: Alert and Oriented    Patient Cognition: Alert    Hospitalization in the last 30 days (Readmission):  No    If yes, Readmission Assessment in CM Navigator will be completed.    Advance Directives:      Code Status: Full Code   Patient's Primary Decision Maker is: Named in Scanned ACP Document      Discharge Planning:    Patient lives with: Alone Type of Home: House  Primary Care Giver: Self  Patient Support Systems include: Children   Current Financial resources: Medicare  Current community resources: None  Current services prior to admission: None            Current DME:              Type of Home Care services:  None    ADLS  Prior functional level: Independent in ADLs/IADLs  Current functional level: Independent in ADLs/IADLs    PT AM-PAC:   /24  OT AM-PAC:   /24    Family can provide assistance at DC: Yes  Would you like Case Management to discuss the discharge plan with any other family members/significant others, and if so, who? Yes  Plans to Return to Present Housing: Yes  Other Identified Issues/Barriers to RETURNING to current housing: no  Potential Assistance needed at discharge: N/A            Potential DME:    Patient expects to discharge to: House  Plan for transportation at discharge:

## 2024-01-08 NOTE — CONSULTS
Department of Internal Medicine  Nephrology Ebony Rodriguez MD   Consult Note    Reason for consultation: Management of hyponatremia.    Consulting physician: Jericho Patel.    History of present illness: This is a 73 y.o. female with a significant past medical history of  type 2 diabetes mellitus, osteoarthritis, systemic hypertension, hyperlipidemia Small's esophagus and hypothyroidism, who presented to the emergency department yesterday with complaints of fever, nausea, vomiting and diarrhea.  She also has associated dizziness.    Vital signs were stable at presentation.  However, laboratory studies were remarkable for serum sodium 123 mmol/L with normal GFR.  Nephrology consultation was placed for management of hyponatremia.  She has had previous episodes of hyponatremia with sodium as low as 127 mmol/L per most recent sodium from November 2023 was 135 mmol/L.  There was no associated seizures.    Home medications were reviewed and include lisinopril but no thiazide diuretics or SSRI.    Bee venom, Codeine, and Seasonal    Past Medical History:   Diagnosis Date    Abnormal Pap smear of cervix     Anemia     Small's esophagus     Chronic back pain     Chronic sinusitis     Diabetes mellitus (HCC)     Environmental allergies     GERD (gastroesophageal reflux disease)     Hyperlipidemia     Hypertension     OA (osteoarthritis of spine)     generalized.    PONV (postoperative nausea and vomiting)     Thyroid disease     Wears glasses      Scheduled Meds:   sodium chloride flush  5-40 mL IntraVENous 2 times per day    enoxaparin  30 mg SubCUTAneous BID    amLODIPine  2.5 mg Oral Daily    ascorbic acid  500 mg Oral Daily    aspirin  81 mg Oral Daily    clobetasol   Topical BID    cyanocobalamin  1,000 mcg Oral Daily    ferrous sulfate  325 mg Oral BID    levothyroxine  75 mcg Oral Daily    lisinopril  20 mg Oral Daily    metoprolol succinate  50 mg Oral Daily    albuterol sulfate HFA  2 puff Inhalation 4x

## 2024-01-09 VITALS
BODY MASS INDEX: 42.23 KG/M2 | OXYGEN SATURATION: 96 % | HEART RATE: 81 BPM | TEMPERATURE: 98.3 F | WEIGHT: 229.5 LBS | HEIGHT: 62 IN | RESPIRATION RATE: 18 BRPM | SYSTOLIC BLOOD PRESSURE: 138 MMHG | DIASTOLIC BLOOD PRESSURE: 78 MMHG

## 2024-01-09 LAB
GLUCOSE BLD-MCNC: 126 MG/DL (ref 65–105)
GLUCOSE BLD-MCNC: 130 MG/DL (ref 65–105)
GLUCOSE BLD-MCNC: 98 MG/DL (ref 65–105)
SODIUM SERPL-SCNC: 131 MMOL/L (ref 135–144)
SODIUM SERPL-SCNC: 134 MMOL/L (ref 135–144)
SODIUM SERPL-SCNC: 134 MMOL/L (ref 135–144)

## 2024-01-09 PROCEDURE — 84295 ASSAY OF SERUM SODIUM: CPT

## 2024-01-09 PROCEDURE — 6370000000 HC RX 637 (ALT 250 FOR IP): Performed by: NURSE PRACTITIONER

## 2024-01-09 PROCEDURE — 36415 COLL VENOUS BLD VENIPUNCTURE: CPT

## 2024-01-09 PROCEDURE — 94761 N-INVAS EAR/PLS OXIMETRY MLT: CPT

## 2024-01-09 PROCEDURE — 82947 ASSAY GLUCOSE BLOOD QUANT: CPT

## 2024-01-09 PROCEDURE — 6360000002 HC RX W HCPCS: Performed by: INTERNAL MEDICINE

## 2024-01-09 PROCEDURE — 94640 AIRWAY INHALATION TREATMENT: CPT

## 2024-01-09 PROCEDURE — 6370000000 HC RX 637 (ALT 250 FOR IP): Performed by: INTERNAL MEDICINE

## 2024-01-09 PROCEDURE — 2580000003 HC RX 258: Performed by: INTERNAL MEDICINE

## 2024-01-09 RX ORDER — SODIUM CHLORIDE 1 G/1
1 TABLET ORAL 2 TIMES DAILY
Qty: 60 TABLET | Refills: 0 | Status: SHIPPED | OUTPATIENT
Start: 2024-01-09 | End: 2024-02-08

## 2024-01-09 RX ADMIN — METOPROLOL SUCCINATE 50 MG: 50 TABLET, EXTENDED RELEASE ORAL at 10:14

## 2024-01-09 RX ADMIN — ALBUTEROL SULFATE 2 PUFF: 90 AEROSOL, METERED RESPIRATORY (INHALATION) at 16:17

## 2024-01-09 RX ADMIN — ALBUTEROL SULFATE 2 PUFF: 90 AEROSOL, METERED RESPIRATORY (INHALATION) at 08:00

## 2024-01-09 RX ADMIN — ATORVASTATIN CALCIUM 40 MG: 40 TABLET, FILM COATED ORAL at 10:14

## 2024-01-09 RX ADMIN — FERROUS SULFATE TAB 325 MG (65 MG ELEMENTAL FE) 325 MG: 325 (65 FE) TAB at 10:14

## 2024-01-09 RX ADMIN — PANTOPRAZOLE SODIUM 40 MG: 40 TABLET, DELAYED RELEASE ORAL at 05:24

## 2024-01-09 RX ADMIN — LISINOPRIL 20 MG: 20 TABLET ORAL at 10:14

## 2024-01-09 RX ADMIN — AMLODIPINE BESYLATE 2.5 MG: 2.5 TABLET ORAL at 10:14

## 2024-01-09 RX ADMIN — SODIUM CHLORIDE, PRESERVATIVE FREE 10 ML: 5 INJECTION INTRAVENOUS at 10:16

## 2024-01-09 RX ADMIN — OXYCODONE HYDROCHLORIDE AND ACETAMINOPHEN 500 MG: 500 TABLET ORAL at 10:14

## 2024-01-09 RX ADMIN — CLOBETASOL PROPIONATE CREAM USP, 0.05%: 0.5 CREAM TOPICAL at 10:22

## 2024-01-09 RX ADMIN — ENOXAPARIN SODIUM 30 MG: 100 INJECTION SUBCUTANEOUS at 10:14

## 2024-01-09 RX ADMIN — LEVOTHYROXINE SODIUM 75 MCG: 0.07 TABLET ORAL at 05:24

## 2024-01-09 RX ADMIN — ALBUTEROL SULFATE 2 PUFF: 90 AEROSOL, METERED RESPIRATORY (INHALATION) at 12:14

## 2024-01-09 RX ADMIN — ASPIRIN 81 MG: 81 TABLET, COATED ORAL at 10:14

## 2024-01-09 RX ADMIN — CYANOCOBALAMIN TAB 1000 MCG 1000 MCG: 1000 TAB at 10:14

## 2024-01-09 NOTE — PLAN OF CARE
Problem: Safety - Adult  Goal: Free from fall injury  Outcome: Progressing  Note: No falls noted this shift. Patient ambulates with x1 staff assistance without difficulty.  Bed kept in low position, bed alarm activated. Safe environment maintained. Bedside table & call light in reach. Uses call light appropriately when needing assistance.        Problem: Pain  Goal: Verbalizes/displays adequate comfort level or baseline comfort level  Outcome: Progressing  Note: Pain at this time 5/10 on pain scale with pt stating acceptable at this time.       Problem: Chronic Conditions and Co-morbidities  Goal: Patient's chronic conditions and co-morbidity symptoms are monitored and maintained or improved  Outcome: Progressing  Note:   Patient's Chronic Conditions and Co-Morbidity symptoms are monitored and maintained or improved; monitor and assess patient's chronic conditions and comorbid symptoms for stability, deterioration, or improvement; Collaborate with multidisciplinary team to address chronic and comorbid conditions and prevent exacerbation or deterioration.

## 2024-01-09 NOTE — FLOWSHEET NOTE
01/08/24 2054   Treatment Team Notification   Reason for Communication Review case;Critical results  (Na 129)   Critical Lab Information Na 129   Name of Team Member Notified Dr. Rodriguez   Treatment Team Role Consulting Provider   Method of Communication Call   Response See orders   Notification Time 2102       RN updated Dr Rodriguez of pt's Na increase from 125 to 129 in 3 hours and pharmacy recommendation to d/c fluids. RN to change fluids to 0.45NS @50 mL/hr and to call Dr. Rodriguez if Na levels >132 or <120. See MAR/orders.

## 2024-01-09 NOTE — PROGRESS NOTES
[facility]   IN-PATIENT SERVICE   [facility]    HISTORY AND PHYSICAL EXAMINATION            Date:   1/8/2024  Patient name:  Genoveva Damon  Date of admission:  1/7/2024  2:29 PM  MRN:   [id number]  Account:  [id number]  YOB: 1950  PCP:    Benjy Chakraborty Jr., MD  Room:   [facility]  Code Status:    Full Code    Chief Complaint:     Chief Complaint   Patient presents with    Fever    Nausea & Vomiting    Dizziness     Described as feeling lightheaded.    Diarrhea       History Obtained From:     patient, electronic medical record    History of Present Illness:     The patient is a 73 y.o.  Non- / non  female who presents with Fever, Nausea & Vomiting, Dizziness (Described as feeling lightheaded.), and Diarrhea   and she is admitted to the hospital for the management of hyponatremia, COVID  Patient past medical multimedical problem which include diabetes, hypertension, hyperlipidemia, hypothyroidism, history of hyponatremia in the past,  Patient is not feeling very well since Friday, she is throwing up, has 5-6 vomitus in a day, no complaint of blood in the vomitus  Symptom history of loose stools  Patient also complained of cough, nasal congestion she thought that this is her regular allergies  In the emergency room patient, patient was found positive for COVID-19 infection, also found to have hyponatremia of 123    1/8  Patient clinically doing better  Having sore throat  No nausea, vomiting  Loose stools improved, she did not had any bowel movement since patient is here      Past Medical History:     Past Medical History:   Diagnosis Date    Abnormal Pap smear of cervix     Anemia     Small's esophagus     Chronic back pain     Chronic sinusitis     Diabetes mellitus (HCC)     Environmental allergies     GERD (gastroesophageal reflux disease)     Hyperlipidemia     Hypertension     OA (osteoarthritis of spine)     generalized.    PONV (postoperative nausea 
   01/08/24 1950   Encounter Summary   Encounter Overview/Reason  Initial Encounter   Service Provided For: Patient   Referral/Consult From: Rounding   Complexity of Encounter Low   Spiritual/Emotional needs   Type Spiritual Support   Assessment/Intervention/Outcome   Assessment Unable to assess  (isolation)   Intervention Prayer (assurance of)/[place]       
BRONCHOSPASM/BRONCHOCONSTRICTION     [x]         IMPROVE AERATION/BREATH SOUNDS  [x]   ADMINISTER BRONCHODILATOR THERAPY AS APPROPRIATE  [x]   ASSESS BREATH SOUNDS  []   IMPLEMENT AEROSOL/MDI PROTOCOL  [x]   PATIENT EDUCATION AS NEEDED    
IV and Telemetry removed. Daughter at bedside. Reviewed discharge paperwork with patient and daughter. Answered all questions. Pt wheeled down to front lobby.   
Negrita montes served regarding pt requesting nexium and something for cough   
Negrita montes served regarding pt requesting something for sleep  
Physical Therapy          Physical Therapy Cancel Note      DATE: 2024    NAME: Genoveva Damon  MRN: [id number]   : 1950      Patient not seen this date for Physical Therapy due to:    Patient screened for occupational therapy this date. Writer introduced self and role of PT. Per NAHOMI Reich and patient report, patient is IND in-room. Patient demonstrated IND functional mobility.  Patient denies any concerns or deficits with mobility/self-care. No acute PT needs. PT being discontinued at this time. Please re-consult if there is a change in status. [phone]        Electronically signed by Anastacio López, PT on 2024 at 3:57 PM    
[facility]   OCCUPATIONAL THERAPY MISSED TREATMENT NOTE   INPATIENT   Date: 24  Patient Name: Genoveva Damon       Room:   MRN: [id number]   Account #: [id number]    : 1950  (73 y.o.)  Gender: female                 REASON FOR MISSED TREATMENT:  Patient screened for occupational therapy this date. Writer introduced self and role of OT. Per NAHOMI Reich and patient report, patient is IND in-room. Patient demonstrated IND functional mobility.  Patient denies any concerns or deficits with self-care. No acute OT needs. OT being discontinued at this time. Please re-consult if there is a change in status. [phone]     Electronically signed by SANTY Marx on 24 at 3:08 PM EST   
Pt did NOT have any bowel movements so NAHOMI Archer and CTP Lily were unable to collect a stool sample.  
RN rounded with Dr. Rodriguez and notified him of Na of 123. He stated that he will enter orders.   
Talk to Dr. Serrato on phone, patient can be discharged home with salt tablets 1 tablet twice a day, need to have BMP on Friday  Outpatient follow-up with Dr. Rodriguez   
MD Lalito   lisinopril (PRINIVIL;ZESTRIL) 20 MG tablet Take 1 tablet by mouth daily    Lalito Sin MD   metoprolol succinate (TOPROL XL) 50 MG extended release tablet Take 1 tablet by mouth daily    Lalito Sin MD   Magnesium Oxide (MAG- PO) Take 800 mg by mouth daily Takes in afternoon    ProviderLalito MD   ferrous sulfate (FE TABS) 325 (65 Fe) MG EC tablet Take 1 tablet by mouth 2 times daily 5/17/19   Jenny Chavez DO   levothyroxine (SYNTHROID) 75 MCG tablet Take 1 tablet by mouth Daily    Lalito Sin MD   sennosides-docusate sodium (SENOKOT-S) 8.6-50 MG tablet Take 1 tablet by mouth daily 5/16/19   Jenny Chavez DO   amLODIPine (NORVASC) 5 MG tablet Take 1 tablet by mouth daily    ProviderLalito MD   simvastatin (ZOCOR) 40 MG tablet 0.5 tablets 7/16/18   Lalito Sin MD   meloxicam (MOBIC) 15 MG tablet TK 1 T PO QD 1/24/18   Lalito Sin MD   PROAIR  (90 BASE) MCG/ACT inhaler  4/28/17   Lalito Sin MD   EPIPEN 2-ELISEO 0.3 MG/0.3ML SOAJ injection  4/2/17   Lalito Sin MD   NEXIUM 40 MG delayed release capsule  5/12/17   Lalito Sin MD   ACCU-CHEK ANNABEL PLUS strip  4/4/17   Lalito Sin MD   Accu-Chejose Multiclix Lancets MISC  4/4/17   ProviderLalito MD   HYDROcodone-acetaminophen (NORCO) 5-325 MG per tablet TK 1 T PO TID PRN MUST LAST 30 DAYS 5/11/17   Lalito Sin MD   metFORMIN (GLUCOPHAGE) 1000 MG tablet Take 1 tablet by mouth 2 times daily (with meals) 4/24/17   Lalito iSn MD   CALCIUM-VITAMIN D PO Take 1 tablet by mouth daily 1200mg/1000mg    Lalito Sin MD   Multiple Vitamins-Minerals (THERAPEUTIC MULTIVITAMIN-MINERALS) tablet Take 1 tablet by mouth daily    ProviderLalito MD        Allergies:     Bee venom, Codeine, and Seasonal    Social History:     Tobacco:    reports that she quit smoking about 13 years ago. She has never used smokeless 
    IMPRESSION/RECOMMENDATIONS:      1.  Acute hyponatremia - multifactorial etiology and secondary to vomiting and SIADH.    Random urine electrolytes not consistent with dehydration.  Random urine osmolality is inappropriately high at 606 consistent with SIADH.  S/p Tolvaptan x 1 dose 1/8/24  SNa 134    2.  Systemic hypertension blood pressure control is adequate.    Plan  DC IVF  Monitor SNa    Prognosis is guarded.    Thank you very much for the courtesy and confidence of this consultation.    Davian Serrato MD  Attending Nephrologist  1/9/2024 3:24 PM

## 2024-01-09 NOTE — PLAN OF CARE
Problem: Safety - Adult  Goal: Free from fall injury  1/9/2024 1639 by Fabiola Matta, RN  Outcome: Progressing     Problem: Pain  Goal: Verbalizes/displays adequate comfort level or baseline comfort level  1/9/2024 1639 by Fabiola Matta, RN  Outcome: Progressing     Problem: ABCDS Injury Assessment  Goal: Absence of physical injury  Outcome: Progressing

## 2024-01-09 NOTE — CARE COORDINATION
ONGOING DISCHARGE PLAN:    Patient is alert and oriented x4.    Spoke with patient regarding discharge plan and patient confirms that plan is still to discharge to home with no needs    Monitor NA     DC IVF    Will follow for needs      Will continue to follow for additional discharge needs.    If patient is discharged prior to next notation, then this note serves as note for discharge by case management.    Electronically signed by Marika Damon RN on 1/9/2024 at 4:03 PM

## 2024-01-09 NOTE — FLOWSHEET NOTE
01/09/24 1149   Treatment Team Notification   Reason for Communication Evaluate   Name of Team Member Notified Dr. Serrato   Treatment Team Role Consulting Provider   Method of Communication Call   Response No new orders   Notification Time 1150     Notified Dr. Serrato of Na 134 per orders. Keep orders as is with 0.45 NS.

## 2024-01-10 RX ORDER — SODIUM CHLORIDE 1 G/1
1 TABLET ORAL 2 TIMES DAILY
Qty: 180 TABLET | OUTPATIENT
Start: 2024-01-10 | End: 2024-02-09

## 2024-01-18 ENCOUNTER — HOSPITAL ENCOUNTER (OUTPATIENT)
Dept: MAMMOGRAPHY | Age: 74
Discharge: HOME | End: 2024-01-20
Attending: OBSTETRICS & GYNECOLOGY

## 2024-01-18 DIAGNOSIS — Z13.820 SCREENING FOR OSTEOPOROSIS: ICD-10-CM

## 2024-01-18 DIAGNOSIS — Z78.0 POST-MENOPAUSAL: ICD-10-CM

## 2024-01-18 PROBLEM — N18.2 CKD (CHRONIC KIDNEY DISEASE), STAGE II: Status: ACTIVE | Noted: 2024-01-18

## 2024-01-18 PROBLEM — E11.9 TYPE 2 DIABETES MELLITUS (HCC): Status: ACTIVE | Noted: 2024-01-18

## 2024-01-18 PROBLEM — E78.00 HYPERCHOLESTEROLEMIA: Status: ACTIVE | Noted: 2024-01-18

## 2024-01-18 PROBLEM — E78.5 HYPERLIPEMIA: Status: ACTIVE | Noted: 2024-01-18

## 2024-02-26 ENCOUNTER — HOSPITAL ENCOUNTER (OUTPATIENT)
Dept: PREADMISSION TESTING | Age: 74
Discharge: HOME | End: 2024-03-01

## 2024-02-26 VITALS — WEIGHT: 230 LBS | HEIGHT: 62 IN | BODY MASS INDEX: 42.33 KG/M2

## 2024-02-26 NOTE — PROGRESS NOTES
Pre-op Instructions For Out-Patient Endoscopy Surgery    Medication Instructions:  Please stop herbs and any supplements now (includes vitamins and minerals).  Holding iron 1 week  Please contact your surgeon and prescribing physician for pre-op instructions for any blood thinners.  Holding aspirin and meloxicam  If you have inhalers/aerosol treatments at home, please use them the morning of your surgery and bring the inhalers with you to the hospital.  Will bring proair  Please take the following medications the morning of your surgery with a sip of water:    amlodipine,levothyroxine and aspirin    Surgery Instructions:  After midnight before surgery:  Do not eat or drink anything, including water, mints, gum, and hard candy.  You may brush your teeth without swallowing.  No smoking, chewing tobacco, or street drugs.    Please shower or bathe before surgery.       Please do not wear any cologne, lotion, powder, jewelry, piercings, perfume, makeup, nail polish, hair accessories, or hair spray on the day of surgery.  Wear loose comfortable clothing.    Leave your valuables at home but bring a payment source for any after-surgery prescriptions you plan to fill at [facility].  Bring a storage case for any glasses/contacts.    An adult who is responsible for you MUST drive you home and should be with you for the first 24 hours after surgery.     The Day of Surgery:  Arrive at [facility] Surgery Entrance at the time directed by your surgeon and check in at the desk.     If you have a living will or healthcare power of , please bring a copy.    You will be taken to the pre-op holding area where you will be prepared for surgery.  A physical assessment will be performed by a nurse practitioner or house officer.  Your IV will be started and you will meet your anesthesiologist.    When you go to surgery, your family will be directed to the surgical waiting room, where the doctor should speak

## 2024-02-28 ENCOUNTER — HOSPITAL ENCOUNTER (OUTPATIENT)
Dept: MAMMOGRAPHY | Age: 74
Discharge: HOME | End: 2024-03-01
Attending: OBSTETRICS & GYNECOLOGY
Payer: MEDICARE

## 2024-02-28 VITALS — BODY MASS INDEX: 42.33 KG/M2 | WEIGHT: 230 LBS | HEIGHT: 62 IN

## 2024-02-28 PROCEDURE — 77080 DXA BONE DENSITY AXIAL: CPT

## 2024-02-29 DIAGNOSIS — N90.89 VULVAR LESION: ICD-10-CM

## 2024-02-29 DIAGNOSIS — N90.4 LICHEN SCLEROSUS OF FEMALE GENITALIA: ICD-10-CM

## 2024-02-29 DIAGNOSIS — N89.8 VAGINAL ITCHING: ICD-10-CM

## 2024-03-02 RX ORDER — CLOBETASOL PROPIONATE 0.5 MG/G
CREAM TOPICAL
Qty: 180 G | Refills: 2 | Status: SHIPPED | OUTPATIENT
Start: 2024-03-02

## 2024-03-07 NOTE — PRE-PROCEDURE INSTRUCTIONS
Have you received your Prep? Any questions with prep instructions? Y  Only Clear Liquid Diet day before.Y  Nothing to eat after midnight day before procedure.Y  Are you taking any blood thinners? If so, you need to Stop.Y  Remove any jewelry and body piercings.Y  Do you wear glasses? If so, please bring a case to store them in.  Are you having any Covid symptoms?N  Do you have any new rashes, infections, etc. that we should be aware of?N  Do you have a ride home the day of surgery? It cannot be a cab or medical transportation.Y  Verify surgery time/date and what time to arrive at hospital.[phone]

## 2024-03-08 ENCOUNTER — ANESTHESIA EVENT (OUTPATIENT)
Dept: ENDOSCOPY | Age: 74
End: 2024-03-08
Payer: MEDICARE

## 2024-03-11 ENCOUNTER — HOSPITAL ENCOUNTER (OUTPATIENT)
Age: 74
Setting detail: OUTPATIENT SURGERY
Discharge: HOME | End: 2024-03-11
Attending: SURGERY | Admitting: SURGERY
Payer: MEDICARE

## 2024-03-11 ENCOUNTER — ANESTHESIA (OUTPATIENT)
Dept: ENDOSCOPY | Age: 74
End: 2024-03-11
Payer: MEDICARE

## 2024-03-11 VITALS
SYSTOLIC BLOOD PRESSURE: 106 MMHG | WEIGHT: 230 LBS | TEMPERATURE: 97.2 F | BODY MASS INDEX: 42.33 KG/M2 | OXYGEN SATURATION: 93 % | HEIGHT: 62 IN | RESPIRATION RATE: 18 BRPM | HEART RATE: 84 BPM | DIASTOLIC BLOOD PRESSURE: 61 MMHG

## 2024-03-11 LAB
GLUCOSE BLD-MCNC: 82 MG/DL (ref 65–105)
GLUCOSE BLD-MCNC: 87 MG/DL (ref 65–105)

## 2024-03-11 PROCEDURE — 7100000031 HC ASPR PHASE II RECOVERY - ADDTL 15 MIN: Performed by: SURGERY

## 2024-03-11 PROCEDURE — 7100000001 HC PACU RECOVERY - ADDTL 15 MIN: Performed by: SURGERY

## 2024-03-11 PROCEDURE — 3609027000 HC COLONOSCOPY: Performed by: SURGERY

## 2024-03-11 PROCEDURE — 3700000001 HC ADD 15 MINUTES (ANESTHESIA): Performed by: SURGERY

## 2024-03-11 PROCEDURE — 6360000002 HC RX W HCPCS: Performed by: NURSE ANESTHETIST, CERTIFIED REGISTERED

## 2024-03-11 PROCEDURE — 7100000000 HC PACU RECOVERY - FIRST 15 MIN: Performed by: SURGERY

## 2024-03-11 PROCEDURE — 2709999900 HC NON-CHARGEABLE SUPPLY: Performed by: SURGERY

## 2024-03-11 PROCEDURE — 7100000011 HC PHASE II RECOVERY - ADDTL 15 MIN: Performed by: SURGERY

## 2024-03-11 PROCEDURE — 2580000003 HC RX 258: Performed by: ANESTHESIOLOGY

## 2024-03-11 PROCEDURE — G0105 COLORECTAL SCRN; HI RISK IND: HCPCS | Performed by: SURGERY

## 2024-03-11 PROCEDURE — 7100000010 HC PHASE II RECOVERY - FIRST 15 MIN: Performed by: SURGERY

## 2024-03-11 PROCEDURE — 7100000030 HC ASPR PHASE II RECOVERY - FIRST 15 MIN: Performed by: SURGERY

## 2024-03-11 PROCEDURE — 2500000003 HC RX 250 WO HCPCS: Performed by: NURSE ANESTHETIST, CERTIFIED REGISTERED

## 2024-03-11 PROCEDURE — 82947 ASSAY GLUCOSE BLOOD QUANT: CPT

## 2024-03-11 PROCEDURE — 3700000000 HC ANESTHESIA ATTENDED CARE: Performed by: SURGERY

## 2024-03-11 RX ORDER — METOCLOPRAMIDE HYDROCHLORIDE 5 MG/ML
10 INJECTION INTRAMUSCULAR; INTRAVENOUS
Status: DISCONTINUED | OUTPATIENT
Start: 2024-03-11 | End: 2024-03-11 | Stop reason: HOSPADM

## 2024-03-11 RX ORDER — NALOXONE HYDROCHLORIDE 0.4 MG/ML
INJECTION, SOLUTION INTRAMUSCULAR; INTRAVENOUS; SUBCUTANEOUS PRN
Status: DISCONTINUED | OUTPATIENT
Start: 2024-03-11 | End: 2024-03-11 | Stop reason: HOSPADM

## 2024-03-11 RX ORDER — ROCURONIUM BROMIDE 10 MG/ML
INJECTION, SOLUTION INTRAVENOUS PRN
Status: DISCONTINUED | OUTPATIENT
Start: 2024-03-11 | End: 2024-03-11 | Stop reason: SDUPTHER

## 2024-03-11 RX ORDER — SODIUM CHLORIDE 9 MG/ML
INJECTION, SOLUTION INTRAVENOUS PRN
Status: DISCONTINUED | OUTPATIENT
Start: 2024-03-11 | End: 2024-03-11 | Stop reason: HOSPADM

## 2024-03-11 RX ORDER — DIPHENHYDRAMINE HYDROCHLORIDE 50 MG/ML
12.5 INJECTION INTRAMUSCULAR; INTRAVENOUS
Status: DISCONTINUED | OUTPATIENT
Start: 2024-03-11 | End: 2024-03-11 | Stop reason: HOSPADM

## 2024-03-11 RX ORDER — DEXAMETHASONE SODIUM PHOSPHATE 4 MG/ML
INJECTION, SOLUTION INTRA-ARTICULAR; INTRALESIONAL; INTRAMUSCULAR; INTRAVENOUS; SOFT TISSUE PRN
Status: DISCONTINUED | OUTPATIENT
Start: 2024-03-11 | End: 2024-03-11 | Stop reason: SDUPTHER

## 2024-03-11 RX ORDER — ONDANSETRON 2 MG/ML
4 INJECTION INTRAMUSCULAR; INTRAVENOUS
Status: DISCONTINUED | OUTPATIENT
Start: 2024-03-11 | End: 2024-03-11 | Stop reason: HOSPADM

## 2024-03-11 RX ORDER — PROPOFOL 10 MG/ML
INJECTION, EMULSION INTRAVENOUS PRN
Status: DISCONTINUED | OUTPATIENT
Start: 2024-03-11 | End: 2024-03-11 | Stop reason: SDUPTHER

## 2024-03-11 RX ORDER — ONDANSETRON 2 MG/ML
INJECTION INTRAMUSCULAR; INTRAVENOUS PRN
Status: DISCONTINUED | OUTPATIENT
Start: 2024-03-11 | End: 2024-03-11 | Stop reason: SDUPTHER

## 2024-03-11 RX ORDER — SODIUM CHLORIDE 0.9 % (FLUSH) 0.9 %
5-40 SYRINGE (ML) INJECTION EVERY 12 HOURS SCHEDULED
Status: DISCONTINUED | OUTPATIENT
Start: 2024-03-11 | End: 2024-03-11 | Stop reason: HOSPADM

## 2024-03-11 RX ORDER — SODIUM CHLORIDE 0.9 % (FLUSH) 0.9 %
5-40 SYRINGE (ML) INJECTION PRN
Status: DISCONTINUED | OUTPATIENT
Start: 2024-03-11 | End: 2024-03-11 | Stop reason: HOSPADM

## 2024-03-11 RX ORDER — FENTANYL CITRATE 0.05 MG/ML
25 INJECTION, SOLUTION INTRAMUSCULAR; INTRAVENOUS EVERY 5 MIN PRN
Status: DISCONTINUED | OUTPATIENT
Start: 2024-03-11 | End: 2024-03-11 | Stop reason: HOSPADM

## 2024-03-11 RX ORDER — LIDOCAINE HYDROCHLORIDE 10 MG/ML
1 INJECTION, SOLUTION EPIDURAL; INFILTRATION; INTRACAUDAL; PERINEURAL
Status: DISCONTINUED | OUTPATIENT
Start: 2024-03-11 | End: 2024-03-11 | Stop reason: HOSPADM

## 2024-03-11 RX ORDER — SODIUM CHLORIDE 9 MG/ML
INJECTION, SOLUTION INTRAVENOUS CONTINUOUS
Status: DISCONTINUED | OUTPATIENT
Start: 2024-03-11 | End: 2024-03-11 | Stop reason: HOSPADM

## 2024-03-11 RX ORDER — LIDOCAINE HYDROCHLORIDE 10 MG/ML
INJECTION, SOLUTION EPIDURAL; INFILTRATION; INTRACAUDAL; PERINEURAL PRN
Status: DISCONTINUED | OUTPATIENT
Start: 2024-03-11 | End: 2024-03-11 | Stop reason: SDUPTHER

## 2024-03-11 RX ORDER — GLYCOPYRROLATE 0.2 MG/ML
INJECTION INTRAMUSCULAR; INTRAVENOUS PRN
Status: DISCONTINUED | OUTPATIENT
Start: 2024-03-11 | End: 2024-03-11 | Stop reason: SDUPTHER

## 2024-03-11 RX ADMIN — LIDOCAINE HYDROCHLORIDE 50 MG: 10 INJECTION, SOLUTION EPIDURAL; INFILTRATION; INTRACAUDAL; PERINEURAL at 07:42

## 2024-03-11 RX ADMIN — DEXAMETHASONE SODIUM PHOSPHATE 4 MG: 4 INJECTION INTRA-ARTICULAR; INTRALESIONAL; INTRAMUSCULAR; INTRAVENOUS; SOFT TISSUE at 08:10

## 2024-03-11 RX ADMIN — GLYCOPYRROLATE 0.2 MG: 0.2 INJECTION INTRAMUSCULAR; INTRAVENOUS at 07:42

## 2024-03-11 RX ADMIN — SUGAMMADEX 200 MG: 100 INJECTION, SOLUTION INTRAVENOUS at 08:17

## 2024-03-11 RX ADMIN — SODIUM CHLORIDE: 9 INJECTION, SOLUTION INTRAVENOUS at 06:50

## 2024-03-11 RX ADMIN — PROPOFOL 150 MG: 10 INJECTION, EMULSION INTRAVENOUS at 07:42

## 2024-03-11 RX ADMIN — ONDANSETRON 4 MG: 2 INJECTION INTRAMUSCULAR; INTRAVENOUS at 08:10

## 2024-03-11 RX ADMIN — ROCURONIUM BROMIDE 50 MG: 10 INJECTION, SOLUTION INTRAVENOUS at 07:42

## 2024-03-11 ASSESSMENT — ENCOUNTER SYMPTOMS
BACK PAIN: 1
SHORTNESS OF BREATH: 0
COUGH: 0
SORE THROAT: 0

## 2024-03-11 ASSESSMENT — PAIN - FUNCTIONAL ASSESSMENT
PAIN_FUNCTIONAL_ASSESSMENT: 0-10
PAIN_FUNCTIONAL_ASSESSMENT: 0-10

## 2024-03-11 NOTE — H&P
HISTORY and PHYSICAL  [facility]       NAME:  Genoveva Damon  MRN: [id number]   YOB: 1950   Date: 3/11/2024   Age: 73 y.o.  Gender: female       COMPLAINT AND PRESENT HISTORY:       Genoveva Damon is 73 y.o.  female, here for     Procedure(s):  COLONOSCOPY DIAGNOSTIC    Pre-Op Diagnosis Codes:     * History of colon polyps [Z86.010]    Denies abdominal pain, dysphagia, heartburn.  Denies nausea, vomiting, diarrhea, constipation.  Denies blood in stool, dark tarry stools.  Denies changes in appetite and unintended weight loss.  Denies family history of colon cancer.    Completed and followed prescribed prep. NPO p MN. Took levothyroxine, amlodipine and metoprolol this am with sip of water. Stopped aspirin, mobic one week ago. Denies taking any other blood thinning medications. Denies recent or current chest pain/pressure, palpitations, SOB, recent URI, fever or chills.       RECENT LABS, IMAGING AND TESTING     Lab Results   Component Value Date    WBC 8.60 02/21/2024    RBC 3.29 (L) 02/21/2024    HGB 10.0 (L) 02/21/2024    HCT 30.7 (L) 02/21/2024    MCV 93.3 02/21/2024    MCH 30.4 02/21/2024    MCHC 32.6 02/21/2024    RDW 13.5 01/08/2024     02/21/2024    MPV 6.3 01/08/2024        Lab Results   Component Value Date     02/21/2024    K 4.5 02/21/2024     02/21/2024    CO2 22 02/21/2024    BUN 15 02/21/2024    CREATININE 0.80 02/21/2024    GLUCOSE 136 (H) 02/21/2024    CALCIUM 9.5 02/21/2024    PROT 7.2 02/21/2024    LABALBU 4.1 02/21/2024    BILITOT 0.3 02/21/2024    ALKPHOS 75 02/21/2024    AST 35 02/21/2024    ALT 17 02/21/2024         PAST MEDICAL HISTORY     Past Medical History:   Diagnosis Date    Abnormal Pap smear of cervix     Anemia     Small's esophagus     Chronic back pain     Chronic sinusitis     Diabetes mellitus (HCC)     Environmental allergies     GERD (gastroesophageal reflux disease)     Hyperlipidemia     Hypertension     OA (osteoarthritis of

## 2024-03-11 NOTE — OP NOTE
[facility] Surgery   Oziel Olivares MD, FACS  Patricia Mcneill, APRN-CNP  [address], [address]  [place] [address]  P: [phone], F: [phone]    PROCEDURE NOTE    DATE OF PROCEDURE: 3/11/2024    SURGEON: Oziel Olivares MD    ASSISTANT: None    PREOPERATIVE DIAGNOSIS: History of colon polyp.  Previous poor prep.    POSTOPERATIVE DIAGNOSIS: Suboptimal prep.  Prominent external hemorrhoids.  Sigmoid and scattered diverticulosis.    OPERATION: Total colonoscopy to cecum with intubation of terminal ileum    ANESTHESIA: General    ESTIMATED BLOOD LOSS: None    COMPLICATIONS: None     SPECIMENS:  Was Not Obtained    HISTORY: The patient is a 73 y.o. year old female with history of above preop diagnosis.  I recommended colonoscopy with possible biopsy or polypectomy and I explained the risk, benefits, expected outcome, and alternatives to the procedure.  Risks included but are not limited to bleeding, infection, respiratory distress, hypotension, and perforation of the colon and possibility of missing a lesion.  The patient understands and is in agreement.      PROCEDURE: The patient was given IV conscious sedation.  The patient's SPO2 remained above 90% throughout the procedure. Digital rectal exam was normal.  The colonoscope was inserted through the anus into the rectum and advanced under direct vision to the cecum without difficulty.  Terminal ileum was examined for approximately 2 inches.  The prep was  suboptimal .      Findings:  Terminal ileum: normal    Cecum/Ascending colon: normal    Transverse colon: abnormal: Diverticulosis    Descending/Sigmoid colon: abnormal: Diverticulosis    Rectum/Anus: examined in normal and retroflexed positions and was abnormal: Prominent external hemorrhoids    Withdrawal Time was (minutes): 16      Next screening colonoscopy: 3 years.  If screening is less than 10 years the recommended reason is due: History of colon polyp and suboptimal

## 2024-03-11 NOTE — ANESTHESIA POSTPROCEDURE EVALUATION
Department of Anesthesiology  Postprocedure Note    Patient: Genoveva Damon  MRN: [id number]  YOB: 1950  Date of evaluation: 3/11/2024    Procedure Summary       Date: 03/11/24 Room / Location: [place] [address] / [facility]    Anesthesia Start: 0730 Anesthesia Stop: 0828    Procedure: COLONOSCOPY DIAGNOSTIC Diagnosis:       History of colon polyps      (History of colon polyps [Z86.010])    Surgeons: Oziel Olivares MD Responsible Provider: Gabino Galdamez MD    Anesthesia Type: general ASA Status: 3            Anesthesia Type: No value filed.    Miguel Ángel Phase I: Miguel Ángel Score: 10    Miguel Ángel Phase II: Miguel Ángel Score: 10    Anesthesia Post Evaluation    Patient location during evaluation: PACU  Patient participation: complete - patient participated  Level of consciousness: awake and alert  Airway patency: patent  Nausea & Vomiting: no vomiting  Cardiovascular status: hemodynamically stable  Respiratory status: acceptable  Hydration status: euvolemic  Comments: POST- ANESTHESIA EVALUATION       Pt Name: Genoveva Damon  MRN: [id number]  YOB: 1950  Date of evaluation: 3/11/2024  Time:  10:26 AM      /61   Pulse 84   Temp 97.2 °F (36.2 °C) (Temporal)   Resp 18   Ht 1.575 m (5' 2\")   Wt 104.3 kg (230 lb)   SpO2 93%   BMI 42.07 kg/m²      Consciousness Level  Awake  Cardiopulmonary Status  Stable  Pain Adequately Treated YES  Nausea / Vomiting  NO  Adequate Hydration  YES  Anesthesia Related Complications NONE      Electronically signed by Gabino Galdamez MD on 3/11/2024 at 10:26 AM         Pain management: satisfactory to patient    No notable events documented.

## 2024-03-11 NOTE — ANESTHESIA PRE PROCEDURE
AST 35 02/21/2024 10:28 AM    ALT 17 02/21/2024 10:28 AM       POC Tests: No results for input(s): \"POCGLU\", \"POCNA\", \"POCK\", \"POCCL\", \"POCBUN\", \"POCHEMO\", \"POCHCT\" in the last 72 hours.    Coags:   Lab Results   Component Value Date/Time    PROTIME 12.9 05/16/2019 07:31 AM    INR 1.0 05/16/2019 07:31 AM    APTT 36.6 05/16/2019 07:31 AM       HCG (If Applicable): No results found for: \"PREGTESTUR\", \"PREGSERUM\", \"HCG\", \"HCGQUANT\"     ABGs: No results found for: \"PHART\", \"PO2ART\", \"HRF7WTJ\", \"HWY0ZCU\", \"BEART\", \"G1AGMVYF\"     Type & Screen (If Applicable):  No results found for: \"LABABO\", \"LABRH\"    Drug/Infectious Status (If Applicable):  No results found for: \"HIV\", \"HEPCAB\"    COVID-19 Screening (If Applicable):   Lab Results   Component Value Date/Time    COVID19 DETECTED 01/07/2024 02:59 PM           Anesthesia Evaluation  Patient summary reviewed and Nursing notes reviewed   history of anesthetic complications: PONV.  Airway: Mallampati: II  TM distance: >3 FB   Neck ROM: full  Mouth opening: > = 3 FB   Dental:    (+) upper dentures, lower dentures and partials      Pulmonary:Negative Pulmonary ROS breath sounds clear to auscultation                             Cardiovascular:    (+) hypertension:        Rhythm: regular  Rate: normal                    Neuro/Psych:   Negative Neuro/Psych ROS              GI/Hepatic/Renal:   (+) GERD:, morbid obesity          Endo/Other:    (+) DiabetesType II DM, hypothyroidism, blood dyscrasia: anemia, arthritis:..                 Abdominal:             Vascular: negative vascular ROS.         Other Findings:         Anesthesia Plan      general     ASA 3     (LMA)  Induction: intravenous.    MIPS: Prophylactic antiemetics administered.  Anesthetic plan and risks discussed with patient.      Plan discussed with CRNA.                Gabino Galdamez MD   3/11/2024

## 2024-03-11 NOTE — DISCHARGE INSTRUCTIONS
DISCHARGE INSTRUCTIONS FOR COLONOSCOPY    In order to continue your care at home, please follow the instructions below.    For General Anesthesia:  Do not drink any alcoholic beverages or make any legal or important decisions for 24 hours.    Do not drive or operate machinery for 24 hours.  You may return to work after 24 hours.        Diet    Drink plenty of fluids after surgery, unless you are on a fluid restriction.   After general anesthesia, start out eating lightly (broth, soup, bread, etc.) advancing as tolerated to your usual diet.  Try to avoid spicy or greasy/fatty foods for 24 hours.   Avoid milk/milk product for several hours.       Medications  Take medications as ordered by your surgeon.    Activities  Limit your activities for 24 hours.  Walk around to help pass gas.  You may shower.    Call your surgeon for the following:  If you have abdominal pain that is not relieved by passing gas.   For an oral temperature (by mouth) is 101 degrees or higher  Persistent nausea or vomiting  Rectal bleeding (may be red, maroon, or black) or change in your bowel habits.  Redness or swelling at the IV site.  If you are unable to urinate within 8 hours of surgery.  For any questions or concerns you may have.

## 2024-03-28 ENCOUNTER — OFFICE VISIT (OUTPATIENT)
Age: 74
End: 2024-03-28
Payer: MEDICARE

## 2024-03-28 VITALS
HEART RATE: 68 BPM | BODY MASS INDEX: 42.88 KG/M2 | OXYGEN SATURATION: 99 % | WEIGHT: 233 LBS | DIASTOLIC BLOOD PRESSURE: 79 MMHG | HEIGHT: 62 IN | TEMPERATURE: 97.2 F | SYSTOLIC BLOOD PRESSURE: 165 MMHG

## 2024-03-28 DIAGNOSIS — K64.9 HEMORRHOIDS, UNSPECIFIED HEMORRHOID TYPE: ICD-10-CM

## 2024-03-28 DIAGNOSIS — K57.90 DIVERTICULOSIS: Primary | ICD-10-CM

## 2024-03-28 PROCEDURE — 3078F DIAST BP <80 MM HG: CPT | Performed by: NURSE PRACTITIONER

## 2024-03-28 PROCEDURE — 1123F ACP DISCUSS/DSCN MKR DOCD: CPT | Performed by: NURSE PRACTITIONER

## 2024-03-28 PROCEDURE — 99213 OFFICE O/P EST LOW 20 MIN: CPT | Performed by: NURSE PRACTITIONER

## 2024-03-28 PROCEDURE — 3077F SYST BP >= 140 MM HG: CPT | Performed by: NURSE PRACTITIONER

## 2024-03-28 ASSESSMENT — ENCOUNTER SYMPTOMS
COUGH: 0
SHORTNESS OF BREATH: 0
RHINORRHEA: 0
SORE THROAT: 0

## 2024-03-28 NOTE — PROGRESS NOTES
[facility] General Surgery   Oziel Olivares MD, FACS  Patricia MDick Mcneill, APRN-CNP  [address], [address]  [place] [address]  P: [phone], F: [phone]    General and Robotic Surgery  Endoscopy Follow-Up Visit Note               PATIENT NAME: Genoveva Damon   :  1950   MRN: [id number]   PCP:  Benjy Chakraborty Jr., MD     TODAY'S DATE: 3/28/2024    Chief Complaint   Patient presents with    Post-Op Check     Pt s/p 2 weeks from cscope. Normal BM. No changes since procedure        HISTORY OF PRESENT ILLNESS: 73 y.o. female presents for follow up on colonoscopy findings completed on 3-11-24. Patient tolerated procedure well, no rectal bleeding or pain noted after procedure.    Findings per Dr. Olivares's procedure note are reviewed below:  Terminal ileum: normal     Cecum/Ascending colon: normal     Transverse colon: abnormal: Diverticulosis     Descending/Sigmoid colon: abnormal: Diverticulosis     Rectum/Anus: examined in normal and retroflexed positions and was abnormal: Prominent external hemorrhoids    PAST MEDICAL HISTORY     Past Medical History:   Diagnosis Date    Abnormal Pap smear of cervix     Anemia     Small's esophagus     Chronic back pain     Chronic sinusitis     Diabetes mellitus (HCC)     Environmental allergies     GERD (gastroesophageal reflux disease)     Hyperlipidemia     Hypertension     OA (osteoarthritis of spine)     generalized.    PONV (postoperative nausea and vomiting)     Thyroid disease     Wears glasses        PROBLEM LIST     Patient Active Problem List   Diagnosis    HPV in female    Recurrent Dysplasia    Lichen sclerosus of female genitalia    LEEP w/Top Hat 18    Cervical dysplasia    Hypertension    Hypothyroidism    RALH BSO w/ Cystoscopy & Retrograde Bladder Instillation 19    Postoperative state    [place] grade 2 cystocele    Urinary incontinence    Hyponatremia    Type 2 diabetes mellitus (HCC)    Hyperlipemia    CKD

## 2024-04-09 DIAGNOSIS — N89.8 VAGINAL IRRITATION: ICD-10-CM

## 2024-04-09 DIAGNOSIS — Z90.710 S/P HYSTERECTOMY: ICD-10-CM

## 2024-04-09 DIAGNOSIS — N95.2 VAGINAL ATROPHY: ICD-10-CM

## 2024-04-13 RX ORDER — ESTRADIOL 0.1 MG/G
CREAM VAGINAL
Qty: 42.5 G | Refills: 2 | Status: SHIPPED | OUTPATIENT
Start: 2024-04-13

## 2024-06-27 ENCOUNTER — HOSPITAL ENCOUNTER (OUTPATIENT)
Age: 74
Discharge: HOME | End: 2024-06-29
Attending: INTERNAL MEDICINE
Payer: MEDICARE

## 2024-06-27 VITALS
DIASTOLIC BLOOD PRESSURE: 70 MMHG | HEART RATE: 75 BPM | HEIGHT: 62 IN | BODY MASS INDEX: 44.53 KG/M2 | SYSTOLIC BLOOD PRESSURE: 128 MMHG | WEIGHT: 242 LBS

## 2024-06-27 DIAGNOSIS — N18.2 CKD (CHRONIC KIDNEY DISEASE), STAGE II: ICD-10-CM

## 2024-06-27 DIAGNOSIS — R06.02 SOB (SHORTNESS OF BREATH): ICD-10-CM

## 2024-06-27 DIAGNOSIS — R60.9 FLUID RETENTION: ICD-10-CM

## 2024-06-27 DIAGNOSIS — R60.0 PERIPHERAL EDEMA: ICD-10-CM

## 2024-06-27 LAB
ECHO AO ROOT DIAM: 3.3 CM
ECHO AO ROOT INDEX: 1.59 CM/M2
ECHO AV AREA PEAK VELOCITY: 2.6 CM2
ECHO AV AREA VTI: 2.4 CM2
ECHO AV AREA/BSA PEAK VELOCITY: 1.3 CM2/M2
ECHO AV AREA/BSA VTI: 1.2 CM2/M2
ECHO AV MEAN GRADIENT: 7 MMHG
ECHO AV MEAN VELOCITY: 1.3 M/S
ECHO AV PEAK GRADIENT: 12 MMHG
ECHO AV PEAK VELOCITY: 1.8 M/S
ECHO AV VELOCITY RATIO: 0.72
ECHO AV VTI: 39.6 CM
ECHO BSA: 2.19 M2
ECHO EST RA PRESSURE: 3 MMHG
ECHO LA AREA 2C: 19.3 CM2
ECHO LA AREA 4C: 24 CM2
ECHO LA DIAMETER INDEX: 2.51 CM/M2
ECHO LA DIAMETER: 5.2 CM
ECHO LA MAJOR AXIS: 6.6 CM
ECHO LA MINOR AXIS: 6.1 CM
ECHO LA TO AORTIC ROOT RATIO: 1.58
ECHO LA VOL BP: 61 ML (ref 22–52)
ECHO LA VOL MOD A2C: 48 ML (ref 22–52)
ECHO LA VOL MOD A4C: 71 ML (ref 22–52)
ECHO LA VOL/BSA BIPLANE: 29 ML/M2 (ref 16–34)
ECHO LA VOLUME INDEX MOD A2C: 23 ML/M2 (ref 16–34)
ECHO LA VOLUME INDEX MOD A4C: 34 ML/M2 (ref 16–34)
ECHO LV E' LATERAL VELOCITY: 7 CM/S
ECHO LV E' SEPTAL VELOCITY: 4 CM/S
ECHO LV FRACTIONAL SHORTENING: 36 % (ref 28–44)
ECHO LV INTERNAL DIMENSION DIASTOLE INDEX: 2.13 CM/M2
ECHO LV INTERNAL DIMENSION DIASTOLIC: 4.4 CM (ref 3.9–5.3)
ECHO LV INTERNAL DIMENSION SYSTOLIC INDEX: 1.35 CM/M2
ECHO LV INTERNAL DIMENSION SYSTOLIC: 2.8 CM
ECHO LV IVSD: 1.2 CM (ref 0.6–0.9)
ECHO LV MASS 2D: 203 G (ref 67–162)
ECHO LV MASS INDEX 2D: 98.1 G/M2 (ref 43–95)
ECHO LV POSTERIOR WALL DIASTOLIC: 1.3 CM (ref 0.6–0.9)
ECHO LV RELATIVE WALL THICKNESS RATIO: 0.59
ECHO LVOT AREA: 3.5 CM2
ECHO LVOT AV VTI INDEX: 0.69
ECHO LVOT DIAM: 2.1 CM
ECHO LVOT MEAN GRADIENT: 4 MMHG
ECHO LVOT PEAK GRADIENT: 7 MMHG
ECHO LVOT PEAK VELOCITY: 1.3 M/S
ECHO LVOT STROKE VOLUME INDEX: 46 ML/M2
ECHO LVOT SV: 95.2 ML
ECHO LVOT VTI: 27.5 CM
ECHO MV A VELOCITY: 1.38 M/S
ECHO MV AREA PHT: 2.1 CM2
ECHO MV AREA VTI: 2.3 CM2
ECHO MV E DECELERATION TIME (DT): 285 MS
ECHO MV E VELOCITY: 1.01 M/S
ECHO MV E/A RATIO: 0.73
ECHO MV E/E' LATERAL: 14.43
ECHO MV E/E' RATIO (AVERAGED): 19.84
ECHO MV E/E' SEPTAL: 25.25
ECHO MV LVOT VTI INDEX: 1.5
ECHO MV MAX VELOCITY: 1.3 M/S
ECHO MV MEAN GRADIENT: 2 MMHG
ECHO MV MEAN VELOCITY: 0.7 M/S
ECHO MV PEAK GRADIENT: 7 MMHG
ECHO MV PRESSURE HALF TIME (PHT): 104 MS
ECHO MV VTI: 41.2 CM
ECHO RA AREA 4C: 20.6 CM2
ECHO RA END SYSTOLIC VOLUME APICAL 4 CHAMBER INDEX BSA: 29 ML/M2
ECHO RA VOLUME: 59 ML
ECHO RIGHT VENTRICULAR SYSTOLIC PRESSURE (RVSP): 30 MMHG
ECHO RV TAPSE: 2.4 CM (ref 1.7–?)
ECHO TV REGURGITANT MAX VELOCITY: 2.62 M/S
ECHO TV REGURGITANT PEAK GRADIENT: 25 MMHG

## 2024-06-27 PROCEDURE — 93306 TTE W/DOPPLER COMPLETE: CPT

## 2024-08-10 ENCOUNTER — HOSPITAL ENCOUNTER (EMERGENCY)
Age: 74
Discharge: HOME | End: 2024-08-10
Attending: EMERGENCY MEDICINE
Payer: MEDICARE

## 2024-08-10 VITALS
RESPIRATION RATE: 11 BRPM | HEART RATE: 78 BPM | HEIGHT: 62 IN | OXYGEN SATURATION: 94 % | WEIGHT: 231 LBS | SYSTOLIC BLOOD PRESSURE: 118 MMHG | TEMPERATURE: 97.9 F | DIASTOLIC BLOOD PRESSURE: 92 MMHG | BODY MASS INDEX: 42.51 KG/M2

## 2024-08-10 DIAGNOSIS — T63.441A ALLERGIC REACTION TO BEE STING: Primary | ICD-10-CM

## 2024-08-10 PROCEDURE — 96374 THER/PROPH/DIAG INJ IV PUSH: CPT | Performed by: EMERGENCY MEDICINE

## 2024-08-10 PROCEDURE — 96375 TX/PRO/DX INJ NEW DRUG ADDON: CPT | Performed by: EMERGENCY MEDICINE

## 2024-08-10 PROCEDURE — 99284 EMERGENCY DEPT VISIT MOD MDM: CPT | Performed by: EMERGENCY MEDICINE

## 2024-08-10 PROCEDURE — 96372 THER/PROPH/DIAG INJ SC/IM: CPT | Performed by: EMERGENCY MEDICINE

## 2024-08-10 PROCEDURE — 6360000002 HC RX W HCPCS: Performed by: EMERGENCY MEDICINE

## 2024-08-10 PROCEDURE — 2580000003 HC RX 258: Performed by: EMERGENCY MEDICINE

## 2024-08-10 PROCEDURE — 6360000002 HC RX W HCPCS

## 2024-08-10 PROCEDURE — 2500000003 HC RX 250 WO HCPCS: Performed by: EMERGENCY MEDICINE

## 2024-08-10 RX ORDER — EPINEPHRINE 1 MG/ML
INJECTION, SOLUTION INTRAMUSCULAR; SUBCUTANEOUS
Status: COMPLETED
Start: 2024-08-10 | End: 2024-08-10

## 2024-08-10 RX ORDER — EPINEPHRINE 0.3 MG/.3ML
0.3 INJECTION SUBCUTANEOUS ONCE
Qty: 0.3 ML | Refills: 0 | Status: SHIPPED | OUTPATIENT
Start: 2024-08-10 | End: 2024-08-10

## 2024-08-10 RX ORDER — DIPHENHYDRAMINE HYDROCHLORIDE 50 MG/ML
25 INJECTION INTRAMUSCULAR; INTRAVENOUS ONCE
Status: COMPLETED | OUTPATIENT
Start: 2024-08-10 | End: 2024-08-10

## 2024-08-10 RX ORDER — PREDNISONE 20 MG/1
40 TABLET ORAL DAILY
Qty: 10 TABLET | Refills: 0 | Status: SHIPPED | OUTPATIENT
Start: 2024-08-10 | End: 2024-08-15

## 2024-08-10 RX ORDER — EPINEPHRINE 1 MG/ML
0.3 INJECTION, SOLUTION INTRAMUSCULAR; SUBCUTANEOUS ONCE
Status: COMPLETED | OUTPATIENT
Start: 2024-08-10 | End: 2024-08-10

## 2024-08-10 RX ADMIN — METHYLPREDNISOLONE SODIUM SUCCINATE 125 MG: 125 INJECTION, POWDER, FOR SOLUTION INTRAMUSCULAR; INTRAVENOUS at 12:35

## 2024-08-10 RX ADMIN — EPINEPHRINE 0.3 MG: 1 INJECTION INTRAMUSCULAR; INTRAVENOUS; SUBCUTANEOUS at 11:50

## 2024-08-10 RX ADMIN — FAMOTIDINE 20 MG: 10 INJECTION, SOLUTION INTRAVENOUS at 12:37

## 2024-08-10 RX ADMIN — DIPHENHYDRAMINE HYDROCHLORIDE 25 MG: 50 INJECTION INTRAMUSCULAR; INTRAVENOUS at 12:39

## 2024-08-10 RX ADMIN — EPINEPHRINE 0.3 MG: 1 INJECTION, SOLUTION INTRAMUSCULAR; SUBCUTANEOUS at 11:50

## 2024-08-10 ASSESSMENT — PAIN SCALES - GENERAL: PAINLEVEL_OUTOF10: 0

## 2024-08-10 ASSESSMENT — PAIN - FUNCTIONAL ASSESSMENT: PAIN_FUNCTIONAL_ASSESSMENT: 0-10

## 2024-08-10 NOTE — DISCHARGE INSTRUCTIONS
Return to the emergency department if symptoms worsen or persist  Follow-up with your family doctor on Monday.  Avoid unnecessary secondary exposure by remaining inside.  Remember to keep your EpiPen with you at all times  Take over-the-counter Pepcid as well as Benadryl as needed.  I will prescribe 5 days of prednisone.

## 2024-08-10 NOTE — ED PROVIDER NOTES
[facility] Emergency Department  [address]  [facility] [address]  Phone: [phone]  Fax: [phone]    eMERGENCY dEPARTMENT eNCOUnter        Pt Name: eGnoveva Damon  MRN: [id number]  Birthdate 1950  Date of evaluation: 8/10/24    CHIEF COMPLAINT     Chief Complaint   Patient presents with    Allergic Reaction       HISTORY OF PRESENT ILLNESS    Genoveva Damon is a 74 y.o. female who presents to the emergency department approximately 20 minutes after she got bit by a bee behind her right knee while sitting outside.  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  She went inside to try and find her EpiPen that she keeps on the cabinet 1 in her purse and she could not find neither.  She began to feel shaky with  throat closure sensation and immediately came to the emergency department.  She did not take any medications prior to arrival.  She denies any chest pain a little bit of chest tightness with the shortness of breath without any wheezing.  No abdominal pain nausea vomiting or diarrhea.  No dizziness falls recent injury illness or fevers.  She denies any rash lesions or itching.    PAST MEDICAL HISTORY    has a past medical history of Abnormal Pap smear of cervix, Anemia, Small's esophagus, Chronic back pain, Chronic sinusitis, Diabetes mellitus (HCC), Environmental allergies, GERD (gastroesophageal reflux disease), Hyperlipidemia, Hypertension, OA (osteoarthritis of spine), PONV (postoperative nausea and vomiting), Thyroid disease, and Wears glasses.    SURGICAL HISTORY      has a past surgical history that includes Appendectomy; Cholecystectomy; joint replacement (Bilateral); Foot surgery (Right); Tubal ligation; Endoscopy, colon, diagnostic; Endoscopy, colon, diagnostic (09-05-14); Upper gastrointestinal endoscopy (9/11/15); Colposcopy (06/15/2017); pr colonoscopy flx dx w/collj spec when pfrmd (N/A, 9/21/2018); Colonoscopy; LEEP (N/A, 12/18/2018); HYSTERECTOMY ABDOMINAL (N/A,  condition.      DIAGNOSTIC RESULTS     LABS:  No results found for this visit on 08/10/24.    All other labs were within normal range or not returned as of this dictation.    RADIOLOGY:  No orders to display       I have reviewed the disposition diagnosis with the patient and or their family/guardian.  I have answered their questions and givendischarge instructions.  They voiced understanding of these instructions and did not have any further questions or complaints.    PROCEDURES:  Unless otherwise noted below, none     Procedures    FINAL IMPRESSION      1. Allergic reaction to bee sting          DISPOSITION/PLAN   DISPOSITION Decision To Discharge 08/10/2024 04:04:48 PM      PATIENT REFERRED TO:  No follow-up provider specified.    DISCHARGE MEDICATIONS:  Discharge Medication List as of 8/10/2024  2:54 PM        START taking these medications    Details   predniSONE (DELTASONE) 20 MG tablet Take 2 tablets by mouth daily for 5 days, Disp-10 tablet, R-0Print                (Please note that portions of this note were completed with a voice recognition program.  Efforts were made to edit the dictations but occasionally words are mis-transcribed.)    Shaina Cruz DO,(electronically signed)  Board Certified Emergency Physician       Shaina Cruz DO  08/11/24 0859

## 2024-08-10 NOTE — ED TRIAGE NOTES
Was outside was stung by a bee to rt leg.  Unable to find her epi pen, so pt came to emergency room.  Reports sob

## 2024-11-01 NOTE — ANESTHESIA POSTPROCEDURE EVALUATION
POST- ANESTHESIA EVALUATION       Pt Name: Osorio Holden  MRN: [id number]  [place]: 1950  Date of evaluation: 5/16/2019  Time:  2:14 PM      /61   Pulse 85   Temp 97.3 °F (36.3 °C) (Oral)   Resp 16   Ht 5' 2.5\" (1.588 m)   Wt 243 lb (110.2 kg)   SpO2 96%   BMI 43.74 kg/m²      Consciousness Level  Awake  Cardiopulmonary Status  Stable  Pain Adequately Treated YES  Nausea / Vomiting  NO  Adequate Hydration  YES  Anesthesia Related Complications NONE      Electronically signed by Amari Johnston MD on 5/16/2019 at 2:14 PM       Department of Anesthesiology  Postprocedure Note    Patient: Osorio Holden  MRN: [id number]  YOB: 1950  Date of evaluation: 5/16/2019  Time:  2:14 PM     Procedure Summary     Date:  05/16/19 Room / Location:  [address] [place] [address] Denise Gauthier 10 / [address] [place] [address] Denise Gauthier    Anesthesia Start:  0846 Anesthesia Stop:  1456    Procedure:  HYSTERECTOMY ABDOMINAL LAPAROSCOPIC ROBOTIC W/BSO, RETROGRADE BLADDER INSTILLATION & CYSTO (N/A Abdomen) Diagnosis:  (RECURRENT LOW GRADE LESION DYSPLASIA)    Surgeon:  Matt Oliver DO Responsible Provider:  Amari Johnston MD    Anesthesia Type:  general ASA Status:  2          Anesthesia Type: general    Miguel Ángel Phase I: Miguel Ángel Score: 9    Miguel Ángel Phase II:      Last vitals: Reviewed and per EMR flowsheets.        Anesthesia Post Evaluation numerical 0-10

## 2025-01-02 DIAGNOSIS — N89.8 VAGINAL IRRITATION: ICD-10-CM

## 2025-01-02 DIAGNOSIS — N95.2 VAGINAL ATROPHY: ICD-10-CM

## 2025-01-02 DIAGNOSIS — Z90.710 S/P HYSTERECTOMY: ICD-10-CM

## 2025-01-04 RX ORDER — ESTRADIOL 0.1 MG/G
CREAM VAGINAL
Qty: 42.5 G | Refills: 2 | Status: SHIPPED | OUTPATIENT
Start: 2025-01-04

## 2025-03-20 DIAGNOSIS — N89.8 VAGINAL ITCHING: ICD-10-CM

## 2025-03-20 DIAGNOSIS — N90.89 VULVAR LESION: ICD-10-CM

## 2025-03-20 DIAGNOSIS — N90.4 LICHEN SCLEROSUS OF FEMALE GENITALIA: ICD-10-CM

## 2025-03-21 RX ORDER — CLOBETASOL PROPIONATE 0.5 MG/G
CREAM TOPICAL
Qty: 180 G | Refills: 2 | Status: SHIPPED | OUTPATIENT
Start: 2025-03-21

## 2025-04-13 ASSESSMENT — PATIENT HEALTH QUESTIONNAIRE - PHQ9
SUM OF ALL RESPONSES TO PHQ QUESTIONS 1-9: 0
2. FEELING DOWN, DEPRESSED OR HOPELESS: NOT AT ALL
SUM OF ALL RESPONSES TO PHQ QUESTIONS 1-9: 0
SUM OF ALL RESPONSES TO PHQ QUESTIONS 1-9: 0
1. LITTLE INTEREST OR PLEASURE IN DOING THINGS: NOT AT ALL
SUM OF ALL RESPONSES TO PHQ QUESTIONS 1-9: 0
2. FEELING DOWN, DEPRESSED OR HOPELESS: NOT AT ALL
SUM OF ALL RESPONSES TO PHQ9 QUESTIONS 1 & 2: 0
1. LITTLE INTEREST OR PLEASURE IN DOING THINGS: NOT AT ALL

## 2025-04-16 ENCOUNTER — OFFICE VISIT (OUTPATIENT)
Dept: OBGYN CLINIC | Age: 75
End: 2025-04-16

## 2025-04-16 VITALS
HEIGHT: 62 IN | BODY MASS INDEX: 42.88 KG/M2 | WEIGHT: 233 LBS | SYSTOLIC BLOOD PRESSURE: 122 MMHG | DIASTOLIC BLOOD PRESSURE: 68 MMHG

## 2025-04-16 DIAGNOSIS — N95.2 VAGINAL ATROPHY: Primary | ICD-10-CM

## 2025-04-16 DIAGNOSIS — N89.8 VAGINAL IRRITATION: ICD-10-CM

## 2025-04-16 SDOH — ECONOMIC STABILITY: FOOD INSECURITY: WITHIN THE PAST 12 MONTHS, YOU WORRIED THAT YOUR FOOD WOULD RUN OUT BEFORE YOU GOT MONEY TO BUY MORE.: NEVER TRUE

## 2025-04-16 SDOH — ECONOMIC STABILITY: FOOD INSECURITY: WITHIN THE PAST 12 MONTHS, THE FOOD YOU BOUGHT JUST DIDN'T LAST AND YOU DIDN'T HAVE MONEY TO GET MORE.: NEVER TRUE

## 2025-04-16 NOTE — PROGRESS NOTES
History and Physical    Genoveva Damon  2026              75 y.o.  Chief Complaint   Patient presents with    Annual Exam     Med check      No LMP recorded. Patient has had a hysterectomy.             Primary Care Physician: Benjy Chakraborty Jr., MD    The patient was seen and examined. She has no chief complaint today and is here for her annual exam.  Her bowels are regular. There are no voiding complaints. She denies any bloating.  She denies vaginal discharge and was counseled on STD's and the need for barrier contraception.     HPI : Genoveva Damon is a 75 y.o. female     Has a history of known vaginal atrophy - currently using estrogen vaginal cream and clobetasol if needed.    Preventative care orders discussed and ordered where appropriate    History of hysterectomy 2019   History of LEEP 2018    Normal pap smears since that time, she is not sexually active and will be turning 74 next year.  Discussed discontinuing pap smears if this is negative, but she is aware she will still need annual well woman exams and I would like to see her at least every 2 years if prescribing vaginal estrogen.      Today she is declining vaginal exam.  Discussed risk of vaginal and labial lesions and cancers.  She understands and counseled on recommendations as well as her right to refuse examination if she understands risks  ________________________________________________________________________  OB History    Para Term  AB Living   3 3 3 0 0 3   SAB IAB Ectopic Molar Multiple Live Births   0 0 0 0 0 3      # Outcome Date GA Lbr Sabino/2nd Weight Sex Type Anes PTL Lv   3 Term      Vag-Spont  N SHYAM   2 Term      Vag-Spont  N SHYAM   1 Term      Vag-Spont  N SHYAM     Past Medical History:   Diagnosis Date    Abnormal Pap smear of cervix     Anemia     Small's esophagus     Chronic back pain     Chronic sinusitis     Diabetes mellitus (HCC)     Environmental allergies     GERD (gastroesophageal reflux

## 2025-07-04 DIAGNOSIS — N95.2 VAGINAL ATROPHY: ICD-10-CM

## 2025-07-04 DIAGNOSIS — Z90.710 S/P HYSTERECTOMY: ICD-10-CM

## 2025-07-04 DIAGNOSIS — N89.8 VAGINAL IRRITATION: ICD-10-CM

## 2025-07-08 RX ORDER — ESTRADIOL 0.1 MG/G
CREAM VAGINAL
Qty: 42.5 G | Refills: 2 | Status: SHIPPED | OUTPATIENT
Start: 2025-07-08

## (undated) DEVICE — SOLUTION ANTIFOG VIS SYS CLEARIFY LAPSCP

## (undated) DEVICE — ST CHARLES PERI-GYN PACK: Brand: MEDLINE INDUSTRIES, INC.

## (undated) DEVICE — COVER,TABLE,60X90,STERILE: Brand: MEDLINE

## (undated) DEVICE — GLOVE ORANGE PI 7 1/2   MSG9075

## (undated) DEVICE — CANNULA NSL AD TBNG L7FT PVC STR NONFLARED PRNG O2 DEL W STD

## (undated) DEVICE — Z INACTIVE USE 2635503 SOLUTION IRRIG 3000ML ST H2O USP UROMATIC PLAS CONT

## (undated) DEVICE — KIT CLN UP LIN W/ STD SAHARA TBL SHT 40X60IN DRAW/LIFT SHT

## (undated) DEVICE — AIRSEAL 8 MM ACCESS PORT AND LOW PROFILE OBTURATOR WITH BLADELESS OPTICAL TIP, 120 MM LENGTH: Brand: AIRSEAL

## (undated) DEVICE — YANKAUER,BULB TIP,W/O VENT,RIGID,STERILE: Brand: MEDLINE

## (undated) DEVICE — Z DISCONTINUED USE 2424143 ADAPTER O2 SWVL CHRISTMAS TREE GRN

## (undated) DEVICE — KIT DRP 3 ARM ACC DISP ENDOWRIST DA VINCI SI

## (undated) DEVICE — ENDO KIT W/SYRINGE: Brand: MEDLINE INDUSTRIES, INC.

## (undated) DEVICE — DEFENDO AIR WATER SUCTION AND BIOPSY VALVE KIT FOR  OLYMPUS: Brand: DEFENDO AIR/WATER/SUCTION AND BIOPSY VALVE

## (undated) DEVICE — Z INACTIVE USE 2525529 CONNECTOR TBNG FOR O2

## (undated) DEVICE — ELECTRODE ES L11CM DIA5MM BALL SHFT RED DISP UTAHLOOP

## (undated) DEVICE — ELECTRODE ENDO L5XW5MM 11CM PUR SHFT MPLR RND LOOP DISP

## (undated) DEVICE — VESSEL SEALER EXTEND: Brand: ENDOWRIST

## (undated) DEVICE — JELLY,LUBE,STERILE,FLIP TOP,TUBE,2-OZ: Brand: MEDLINE

## (undated) DEVICE — FORCEPS BX L240CM JAW DIA2.4MM ORNG L CAP W/ NDL DISP RAD

## (undated) DEVICE — VCARE LARGE, UTERINE MANIPULATOR, VAGINAL-CERVICAL-AHLUWALIA'S-RETRACTOR-ELEVATOR: Brand: VCARE

## (undated) DEVICE — TROCAR: Brand: KII FIOS FIRST ENTRY

## (undated) DEVICE — BITEBLOCK 54FR W/ DENT RIM BLOX

## (undated) DEVICE — TRI-LUMEN FILTERED TUBE SET WITH ACTIVATED CHARCOAL FILTER: Brand: AIRSEAL

## (undated) DEVICE — SUTURE VCRL + SZ 4-0 L27IN ABSRB WHT FS-2 3/8 CIR REV CUT VCP422H

## (undated) DEVICE — GOWN,POLY REINFORCED,LG: Brand: MEDLINE

## (undated) DEVICE — POUCH INSTR W6.75XL11.5IN FRST 2 PKT ADH FOR ORTH AND

## (undated) DEVICE — CYSTO/BLADDER IRRIGATION SET, REGULATING CLAMP

## (undated) DEVICE — Device

## (undated) DEVICE — COVER,MAYO STAND,XL,STERILE: Brand: MEDLINE

## (undated) DEVICE — SUTURE VCRL + SZ 0 L27IN ABSRB WHT CT-2 1/2 CIR TAPERPOINT VCP270H

## (undated) DEVICE — ELECTRODE ARTHSCP W20MM D12MM SHFT L11CM RND MPLR SAFE T G

## (undated) DEVICE — FORCEPS BX L L240CM DIA2.4MM RAD JAW 4 HOT FOR POLYP DISP

## (undated) DEVICE — TOTAL TRAY, DB, 100% SILI FOLEY, 16FR 10: Brand: MEDLINE

## (undated) DEVICE — SOLUTION IV IRRIG WATER 1000ML POUR BRL 2F7114

## (undated) DEVICE — FORCEPS BX L240CM WRK CHN 2.8MM STD CAP W/ NDL MIC MESH

## (undated) DEVICE — CATHETER F BLLN 5CC 16FR HYDRGEL INF CTRL 2 W RESISTS

## (undated) DEVICE — GOWN,AURORA,NONREINFORCED,LARGE: Brand: MEDLINE

## (undated) DEVICE — SYRINGE, LUER LOCK, 10ML: Brand: MEDLINE

## (undated) DEVICE — NEEDLE SPINAL 22GA L3.5IN SPINOCAN

## (undated) DEVICE — 20 ML SYRINGE LUER-LOCK TIP: Brand: MONOJECT

## (undated) DEVICE — TUBING, SUCTION, 3/16" X 10', STRAIGHT: Brand: MEDLINE

## (undated) DEVICE — ST CHARLES GYN LAPAROSCOPY PK: Brand: MEDLINE INDUSTRIES, INC.

## (undated) DEVICE — SYRINGE MED 10ML SLIP TIP BLNT FILL AND LUERLOCK DISP

## (undated) DEVICE — Z INACTIVE USE 2660664 SOLUTION IRRIG 3000ML 0.9% SOD CHL USP UROMATIC PLAS CONT

## (undated) DEVICE — SOLUTION IV 1000ML 0.9% SOD CHL PH 5 INJ USP VIAFLX PLAS

## (undated) DEVICE — 40580 - THE PINK PAD - ADVANCED TRENDELENBURG POSITIONING KIT: Brand: 40580 - THE PINK PAD - ADVANCED TRENDELENBURG POSITIONING KIT

## (undated) DEVICE — SUTURE VCRL + SZ 3-0 L27IN ABSRB UD L26MM SH 1/2 CIR VCP416H

## (undated) DEVICE — GLOVE SURG BEAD CUF 7 STD PF WHT STRL TRIUMPH LT LTX

## (undated) DEVICE — DRAINBAG,ANTI-REFLUX TOWER,L/F,2000ML,LL: Brand: MEDLINE

## (undated) DEVICE — SKIN AFFIX SURG ADHESIVE 72/CS 0.55ML: Brand: MEDLINE

## (undated) DEVICE — GLOVE ORANGE PI 7   MSG9070

## (undated) DEVICE — SYRINGE MED 50ML LUERSLIP TIP

## (undated) DEVICE — OBTURATOR ROBOTIC DIA8MM BLDELSS ENDOSCP DISP DA VINCI SI

## (undated) DEVICE — Z DUPLICATE USE 2527422 TUBING O2 STD 7 FT EXTN NO CRUSH VISUAL CNTRST LF

## (undated) DEVICE — STRAP,POSITIONING,KNEE/BODY,FOAM,4X60": Brand: MEDLINE

## (undated) DEVICE — PENCIL ES L3M BTTN SWCH HOLSTER W/ BLDE ELECTRD EDGE

## (undated) DEVICE — AGENT HEMSTAT 5GM ARISTA AH

## (undated) DEVICE — INSUFFLATION NEEDLE TO ESTABLISH PNEUMOPERITONEUM.: Brand: INSUFFLATION NEEDLE

## (undated) DEVICE — APPLICATOR SURG XL L38CM FOR ARISTA ABSRB HEMSTAT FLEXITIP

## (undated) DEVICE — Y-TYPE TUR/BLADDER IRRIGATION SET, REGULATING CLAMP